# Patient Record
Sex: FEMALE | Race: BLACK OR AFRICAN AMERICAN | NOT HISPANIC OR LATINO
[De-identification: names, ages, dates, MRNs, and addresses within clinical notes are randomized per-mention and may not be internally consistent; named-entity substitution may affect disease eponyms.]

---

## 2017-01-05 ENCOUNTER — MEDICATION RENEWAL (OUTPATIENT)
Age: 52
End: 2017-01-05

## 2017-01-06 ENCOUNTER — RX RENEWAL (OUTPATIENT)
Age: 52
End: 2017-01-06

## 2017-02-13 ENCOUNTER — LABORATORY RESULT (OUTPATIENT)
Age: 52
End: 2017-02-13

## 2017-02-13 ENCOUNTER — OUTPATIENT (OUTPATIENT)
Dept: OUTPATIENT SERVICES | Facility: HOSPITAL | Age: 52
LOS: 1 days | End: 2017-02-13

## 2017-02-13 ENCOUNTER — APPOINTMENT (OUTPATIENT)
Dept: INTERNAL MEDICINE | Facility: HOSPITAL | Age: 52
End: 2017-02-13

## 2017-02-13 VITALS — DIASTOLIC BLOOD PRESSURE: 86 MMHG | HEART RATE: 76 BPM | SYSTOLIC BLOOD PRESSURE: 140 MMHG

## 2017-02-13 VITALS — BODY MASS INDEX: 32.43 KG/M2 | WEIGHT: 183 LBS | HEIGHT: 63 IN

## 2017-02-13 DIAGNOSIS — G47.33 OBSTRUCTIVE SLEEP APNEA (ADULT) (PEDIATRIC): ICD-10-CM

## 2017-02-13 DIAGNOSIS — Z98.89 OTHER SPECIFIED POSTPROCEDURAL STATES: Chronic | ICD-10-CM

## 2017-02-13 LAB
ALBUMIN SERPL ELPH-MCNC: 4.2 G/DL — SIGNIFICANT CHANGE UP (ref 3.3–5)
ALP SERPL-CCNC: 89 U/L — SIGNIFICANT CHANGE UP (ref 40–120)
ALT FLD-CCNC: 15 U/L — SIGNIFICANT CHANGE UP (ref 4–33)
AST SERPL-CCNC: 18 U/L — SIGNIFICANT CHANGE UP (ref 4–32)
BASOPHILS # BLD AUTO: 0.01 K/UL — SIGNIFICANT CHANGE UP (ref 0–0.2)
BASOPHILS NFR BLD AUTO: 0.1 % — SIGNIFICANT CHANGE UP (ref 0–2)
BILIRUB SERPL-MCNC: 0.5 MG/DL — SIGNIFICANT CHANGE UP (ref 0.2–1.2)
BUN SERPL-MCNC: 10 MG/DL — SIGNIFICANT CHANGE UP (ref 7–23)
CALCIUM SERPL-MCNC: 9.7 MG/DL — SIGNIFICANT CHANGE UP (ref 8.4–10.5)
CHLORIDE SERPL-SCNC: 104 MMOL/L — SIGNIFICANT CHANGE UP (ref 98–107)
CO2 SERPL-SCNC: 26 MMOL/L — SIGNIFICANT CHANGE UP (ref 22–31)
CREAT SERPL-MCNC: 0.73 MG/DL — SIGNIFICANT CHANGE UP (ref 0.5–1.3)
EOSINOPHIL # BLD AUTO: 0.12 K/UL — SIGNIFICANT CHANGE UP (ref 0–0.5)
EOSINOPHIL NFR BLD AUTO: 1.3 % — SIGNIFICANT CHANGE UP (ref 0–6)
ERYTHROCYTE [SEDIMENTATION RATE] IN BLOOD: 53 MM/HR — HIGH (ref 4–25)
GLUCOSE SERPL-MCNC: 107 MG/DL — HIGH (ref 70–99)
HBA1C BLD-MCNC: 6.4 % — HIGH (ref 4–5.6)
HCT VFR BLD CALC: 38 % — SIGNIFICANT CHANGE UP (ref 34.5–45)
HGB BLD-MCNC: 12.7 G/DL — SIGNIFICANT CHANGE UP (ref 11.5–15.5)
IMM GRANULOCYTES NFR BLD AUTO: 0.2 % — SIGNIFICANT CHANGE UP (ref 0–1.5)
LYMPHOCYTES # BLD AUTO: 3.29 K/UL — SIGNIFICANT CHANGE UP (ref 1–3.3)
LYMPHOCYTES # BLD AUTO: 36.3 % — SIGNIFICANT CHANGE UP (ref 13–44)
MCHC RBC-ENTMCNC: 28.9 PG — SIGNIFICANT CHANGE UP (ref 27–34)
MCHC RBC-ENTMCNC: 33.4 % — SIGNIFICANT CHANGE UP (ref 32–36)
MCV RBC AUTO: 86.6 FL — SIGNIFICANT CHANGE UP (ref 80–100)
MONOCYTES # BLD AUTO: 0.3 K/UL — SIGNIFICANT CHANGE UP (ref 0–0.9)
MONOCYTES NFR BLD AUTO: 3.3 % — SIGNIFICANT CHANGE UP (ref 2–14)
NEUTROPHILS # BLD AUTO: 5.32 K/UL — SIGNIFICANT CHANGE UP (ref 1.8–7.4)
NEUTROPHILS NFR BLD AUTO: 58.8 % — SIGNIFICANT CHANGE UP (ref 43–77)
PLATELET # BLD AUTO: 319 K/UL — SIGNIFICANT CHANGE UP (ref 150–400)
PMV BLD: 10 FL — SIGNIFICANT CHANGE UP (ref 7–13)
POTASSIUM SERPL-MCNC: 3.3 MMOL/L — LOW (ref 3.5–5.3)
POTASSIUM SERPL-SCNC: 3.3 MMOL/L — LOW (ref 3.5–5.3)
PROT SERPL-MCNC: 8 G/DL — SIGNIFICANT CHANGE UP (ref 6–8.3)
RBC # BLD: 4.39 M/UL — SIGNIFICANT CHANGE UP (ref 3.8–5.2)
RBC # FLD: 12.9 % — SIGNIFICANT CHANGE UP (ref 10.3–14.5)
SODIUM SERPL-SCNC: 144 MMOL/L — SIGNIFICANT CHANGE UP (ref 135–145)
TSH SERPL-MCNC: 0.57 UIU/ML — SIGNIFICANT CHANGE UP (ref 0.27–4.2)
WBC # BLD: 9.06 K/UL — SIGNIFICANT CHANGE UP (ref 3.8–10.5)
WBC # FLD AUTO: 9.06 K/UL — SIGNIFICANT CHANGE UP (ref 3.8–10.5)

## 2017-02-14 DIAGNOSIS — I50.30 UNSPECIFIED DIASTOLIC (CONGESTIVE) HEART FAILURE: ICD-10-CM

## 2017-02-14 DIAGNOSIS — I10 ESSENTIAL (PRIMARY) HYPERTENSION: ICD-10-CM

## 2017-02-14 DIAGNOSIS — R40.0 SOMNOLENCE: ICD-10-CM

## 2017-02-14 DIAGNOSIS — E87.6 HYPOKALEMIA: ICD-10-CM

## 2017-02-14 LAB
ALDOST SERPL-MCNC: 12.9 NG/DL — SIGNIFICANT CHANGE UP
RENIN DIRECT, PLASMA: < 2.1 PG/ML — SIGNIFICANT CHANGE UP

## 2017-03-01 ENCOUNTER — APPOINTMENT (OUTPATIENT)
Dept: PULMONOLOGY | Facility: CLINIC | Age: 52
End: 2017-03-01

## 2017-03-13 ENCOUNTER — APPOINTMENT (OUTPATIENT)
Dept: OBGYN | Facility: HOSPITAL | Age: 52
End: 2017-03-13

## 2017-04-05 ENCOUNTER — RX RENEWAL (OUTPATIENT)
Age: 52
End: 2017-04-05

## 2017-07-13 ENCOUNTER — EMERGENCY (EMERGENCY)
Facility: HOSPITAL | Age: 52
LOS: 1 days | Discharge: HOME | End: 2017-07-13
Attending: EMERGENCY MEDICINE
Payer: COMMERCIAL

## 2017-07-13 VITALS — DIASTOLIC BLOOD PRESSURE: 76 MMHG | RESPIRATION RATE: 19 BRPM | HEART RATE: 89 BPM | SYSTOLIC BLOOD PRESSURE: 132 MMHG

## 2017-07-13 VITALS
TEMPERATURE: 98 F | SYSTOLIC BLOOD PRESSURE: 124 MMHG | OXYGEN SATURATION: 99 % | HEART RATE: 88 BPM | DIASTOLIC BLOOD PRESSURE: 77 MMHG

## 2017-07-13 DIAGNOSIS — Z98.89 OTHER SPECIFIED POSTPROCEDURAL STATES: Chronic | ICD-10-CM

## 2017-07-13 PROCEDURE — 99284 EMERGENCY DEPT VISIT MOD MDM: CPT

## 2017-07-13 PROCEDURE — 73502 X-RAY EXAM HIP UNI 2-3 VIEWS: CPT

## 2017-07-13 PROCEDURE — 71120 X-RAY EXAM BREASTBONE 2/>VWS: CPT | Mod: 26

## 2017-07-13 PROCEDURE — 73120 X-RAY EXAM OF HAND: CPT

## 2017-07-13 PROCEDURE — 70450 CT HEAD/BRAIN W/O DYE: CPT

## 2017-07-13 PROCEDURE — 73562 X-RAY EXAM OF KNEE 3: CPT | Mod: 26,LT

## 2017-07-13 PROCEDURE — 73502 X-RAY EXAM HIP UNI 2-3 VIEWS: CPT | Mod: 26,LT

## 2017-07-13 PROCEDURE — 71120 X-RAY EXAM BREASTBONE 2/>VWS: CPT

## 2017-07-13 PROCEDURE — 99284 EMERGENCY DEPT VISIT MOD MDM: CPT | Mod: 25

## 2017-07-13 PROCEDURE — 73120 X-RAY EXAM OF HAND: CPT | Mod: 26,LT

## 2017-07-13 PROCEDURE — 71010: CPT | Mod: 26

## 2017-07-13 PROCEDURE — 70450 CT HEAD/BRAIN W/O DYE: CPT | Mod: 26

## 2017-07-13 PROCEDURE — 71045 X-RAY EXAM CHEST 1 VIEW: CPT

## 2017-07-13 PROCEDURE — 73562 X-RAY EXAM OF KNEE 3: CPT

## 2017-07-13 RX ORDER — ACETAMINOPHEN 500 MG
975 TABLET ORAL ONCE
Qty: 0 | Refills: 0 | Status: COMPLETED | OUTPATIENT
Start: 2017-07-13 | End: 2017-07-13

## 2017-07-13 RX ORDER — IBUPROFEN 200 MG
600 TABLET ORAL ONCE
Qty: 0 | Refills: 0 | Status: COMPLETED | OUTPATIENT
Start: 2017-07-13 | End: 2017-07-13

## 2017-07-13 RX ADMIN — Medication 600 MILLIGRAM(S): at 17:19

## 2017-07-13 RX ADMIN — Medication 975 MILLIGRAM(S): at 17:19
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
## 2017-07-13 NOTE — ED PROVIDER NOTE - PLAN OF CARE
You may take 1000mg of tylenol every 6 hours for baseline pain control with respect to the warnings on the label  You may take 600mg of ibuprofen (example: motrin or advil) every 4-6 hours for breakthrough pain control as indicated with respect to the warnings on the label. This is an over the counter medication.   Follow up with the [facility] [phone] within 48 hours.  Refrain from physical activity and activities that involve extreme focus and concentration including reading, texting, studying, working for 2-3 days.

## 2017-07-13 NOTE — ED PROVIDER NOTE - NS ED ROS FT
CONST: no fevers, no chills  EYES: no pain  ENT: no sore throat   CV: right chest pain  RESP: no shortness of breath  ABD: no abdominal pain   : no dysuria  MSK: msk pain as described in hpi  NEURO: frontal headache   HEME: no easy bleeding  SKIN:  no rash
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
## 2017-07-13 NOTE — ED PROVIDER NOTE - PROGRESS NOTE DETAILS
Digna PGY3: feels better, no obvious fractures on exam. no midline tenderness (collar cleared). will be discharged with follow up and concussion precautions
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
## 2017-07-25 ENCOUNTER — RX RENEWAL (OUTPATIENT)
Age: 52
End: 2017-07-25

## 2017-08-22 ENCOUNTER — LABORATORY RESULT (OUTPATIENT)
Age: 52
End: 2017-08-22

## 2017-08-22 ENCOUNTER — RESULT REVIEW (OUTPATIENT)
Age: 52
End: 2017-08-22

## 2017-08-22 ENCOUNTER — OUTPATIENT (OUTPATIENT)
Dept: OUTPATIENT SERVICES | Facility: HOSPITAL | Age: 52
LOS: 1 days | End: 2017-08-22

## 2017-08-22 ENCOUNTER — APPOINTMENT (OUTPATIENT)
Dept: OBGYN | Facility: HOSPITAL | Age: 52
End: 2017-08-22
Payer: COMMERCIAL

## 2017-08-22 VITALS
BODY MASS INDEX: 34.2 KG/M2 | HEIGHT: 63 IN | SYSTOLIC BLOOD PRESSURE: 136 MMHG | DIASTOLIC BLOOD PRESSURE: 74 MMHG | HEART RATE: 79 BPM | WEIGHT: 193 LBS

## 2017-08-22 DIAGNOSIS — Z01.419 ENCOUNTER FOR GYNECOLOGICAL EXAMINATION (GENERAL) (ROUTINE) W/OUT ABNORMAL FINDINGS: ICD-10-CM

## 2017-08-22 DIAGNOSIS — Z98.89 OTHER SPECIFIED POSTPROCEDURAL STATES: Chronic | ICD-10-CM

## 2017-08-22 PROCEDURE — 57500 BIOPSY OF CERVIX: CPT | Mod: GC

## 2017-08-22 PROCEDURE — 99203 OFFICE O/P NEW LOW 30 MIN: CPT | Mod: 25,GC

## 2017-08-23 ENCOUNTER — APPOINTMENT (OUTPATIENT)
Dept: INTERNAL MEDICINE | Facility: HOSPITAL | Age: 52
End: 2017-08-23
Payer: MEDICAID

## 2017-08-23 ENCOUNTER — LABORATORY RESULT (OUTPATIENT)
Age: 52
End: 2017-08-23

## 2017-08-23 ENCOUNTER — OUTPATIENT (OUTPATIENT)
Dept: OUTPATIENT SERVICES | Facility: HOSPITAL | Age: 52
LOS: 1 days | End: 2017-08-23

## 2017-08-23 VITALS — WEIGHT: 189 LBS | HEIGHT: 63 IN | BODY MASS INDEX: 33.49 KG/M2

## 2017-08-23 DIAGNOSIS — Z01.419 ENCOUNTER FOR GYNECOLOGICAL EXAMINATION (GENERAL) (ROUTINE) WITHOUT ABNORMAL FINDINGS: ICD-10-CM

## 2017-08-23 DIAGNOSIS — Z98.89 OTHER SPECIFIED POSTPROCEDURAL STATES: Chronic | ICD-10-CM

## 2017-08-23 DIAGNOSIS — H91.92 UNSPECIFIED HEARING LOSS, LEFT EAR: ICD-10-CM

## 2017-08-23 DIAGNOSIS — N84.1 POLYP OF CERVIX UTERI: ICD-10-CM

## 2017-08-23 DIAGNOSIS — R06.02 SHORTNESS OF BREATH: ICD-10-CM

## 2017-08-23 LAB
BUN SERPL-MCNC: 11 MG/DL — SIGNIFICANT CHANGE UP (ref 7–23)
C TRACH RRNA SPEC QL NAA+PROBE: SIGNIFICANT CHANGE UP
CALCIUM SERPL-MCNC: 9.9 MG/DL — SIGNIFICANT CHANGE UP (ref 8.4–10.5)
CHLORIDE SERPL-SCNC: 102 MMOL/L — SIGNIFICANT CHANGE UP (ref 98–107)
CHOLEST SERPL-MCNC: 179 MG/DL — SIGNIFICANT CHANGE UP (ref 120–199)
CO2 SERPL-SCNC: 28 MMOL/L — SIGNIFICANT CHANGE UP (ref 22–31)
CREAT SERPL-MCNC: 0.78 MG/DL — SIGNIFICANT CHANGE UP (ref 0.5–1.3)
GLUCOSE SERPL-MCNC: 106 MG/DL — HIGH (ref 70–99)
HBA1C BLD-MCNC: 6.7 % — HIGH (ref 4–5.6)
HDLC SERPL-MCNC: 52 MG/DL — SIGNIFICANT CHANGE UP (ref 45–65)
HIV1 AG SER QL: SIGNIFICANT CHANGE UP
HIV1+2 AB SPEC QL: SIGNIFICANT CHANGE UP
HPV HIGH+LOW RISK DNA PNL CVX: SIGNIFICANT CHANGE UP
LIPID PNL WITH DIRECT LDL SERPL: 121 MG/DL — SIGNIFICANT CHANGE UP
N GONORRHOEA RRNA SPEC QL NAA+PROBE: SIGNIFICANT CHANGE UP
POTASSIUM SERPL-MCNC: 3.9 MMOL/L — SIGNIFICANT CHANGE UP (ref 3.5–5.3)
POTASSIUM SERPL-SCNC: 3.9 MMOL/L — SIGNIFICANT CHANGE UP (ref 3.5–5.3)
SODIUM SERPL-SCNC: 143 MMOL/L — SIGNIFICANT CHANGE UP (ref 135–145)
SPECIMEN SOURCE: SIGNIFICANT CHANGE UP
TRIGL SERPL-MCNC: 106 MG/DL — SIGNIFICANT CHANGE UP (ref 10–149)

## 2017-08-23 PROCEDURE — 99213 OFFICE O/P EST LOW 20 MIN: CPT | Mod: GE

## 2017-08-24 VITALS — DIASTOLIC BLOOD PRESSURE: 88 MMHG | SYSTOLIC BLOOD PRESSURE: 140 MMHG

## 2017-08-25 LAB — CYTOLOGY SPEC DOC CYTO: SIGNIFICANT CHANGE UP

## 2017-08-28 DIAGNOSIS — R09.82 POSTNASAL DRIP: ICD-10-CM

## 2017-08-28 DIAGNOSIS — R73.03 PREDIABETES: ICD-10-CM

## 2017-08-28 DIAGNOSIS — I10 ESSENTIAL (PRIMARY) HYPERTENSION: ICD-10-CM

## 2017-08-28 DIAGNOSIS — I50.30 UNSPECIFIED DIASTOLIC (CONGESTIVE) HEART FAILURE: ICD-10-CM

## 2017-12-20 ENCOUNTER — RX RENEWAL (OUTPATIENT)
Age: 52
End: 2017-12-20

## 2017-12-21 ENCOUNTER — TRANSCRIPTION ENCOUNTER (OUTPATIENT)
Age: 52
End: 2017-12-21

## 2018-02-15 ENCOUNTER — LABORATORY RESULT (OUTPATIENT)
Age: 53
End: 2018-02-15

## 2018-02-15 ENCOUNTER — OUTPATIENT (OUTPATIENT)
Dept: OUTPATIENT SERVICES | Facility: HOSPITAL | Age: 53
LOS: 1 days | End: 2018-02-15

## 2018-02-15 ENCOUNTER — MED ADMIN CHARGE (OUTPATIENT)
Age: 53
End: 2018-02-15

## 2018-02-15 ENCOUNTER — APPOINTMENT (OUTPATIENT)
Dept: INTERNAL MEDICINE | Facility: HOSPITAL | Age: 53
End: 2018-02-15
Payer: MEDICAID

## 2018-02-15 VITALS — HEART RATE: 74 BPM | SYSTOLIC BLOOD PRESSURE: 130 MMHG | DIASTOLIC BLOOD PRESSURE: 90 MMHG | RESPIRATION RATE: 12 BRPM

## 2018-02-15 VITALS — HEIGHT: 64 IN | BODY MASS INDEX: 32.66 KG/M2 | WEIGHT: 191.3 LBS

## 2018-02-15 DIAGNOSIS — Z98.89 OTHER SPECIFIED POSTPROCEDURAL STATES: Chronic | ICD-10-CM

## 2018-02-15 LAB
ALBUMIN SERPL ELPH-MCNC: 4.3 G/DL — SIGNIFICANT CHANGE UP (ref 3.3–5)
ALP SERPL-CCNC: 95 U/L — SIGNIFICANT CHANGE UP (ref 40–120)
ALT FLD-CCNC: 20 U/L — SIGNIFICANT CHANGE UP (ref 4–33)
AST SERPL-CCNC: 23 U/L — SIGNIFICANT CHANGE UP (ref 4–32)
BASOPHILS # BLD AUTO: 0.03 K/UL — SIGNIFICANT CHANGE UP (ref 0–0.2)
BASOPHILS NFR BLD AUTO: 0.3 % — SIGNIFICANT CHANGE UP (ref 0–2)
BILIRUB SERPL-MCNC: 0.8 MG/DL — SIGNIFICANT CHANGE UP (ref 0.2–1.2)
BUN SERPL-MCNC: 10 MG/DL — SIGNIFICANT CHANGE UP (ref 7–23)
CALCIUM SERPL-MCNC: 9.4 MG/DL — SIGNIFICANT CHANGE UP (ref 8.4–10.5)
CHLORIDE SERPL-SCNC: 101 MMOL/L — SIGNIFICANT CHANGE UP (ref 98–107)
CO2 SERPL-SCNC: 29 MMOL/L — SIGNIFICANT CHANGE UP (ref 22–31)
CREAT SERPL-MCNC: 0.69 MG/DL — SIGNIFICANT CHANGE UP (ref 0.5–1.3)
EOSINOPHIL # BLD AUTO: 0.13 K/UL — SIGNIFICANT CHANGE UP (ref 0–0.5)
EOSINOPHIL NFR BLD AUTO: 1.3 % — SIGNIFICANT CHANGE UP (ref 0–6)
GLUCOSE SERPL-MCNC: 113 MG/DL — HIGH (ref 70–99)
HBA1C BLD-MCNC: 7.1 % — HIGH (ref 4–5.6)
HCT VFR BLD CALC: 39.7 % — SIGNIFICANT CHANGE UP (ref 34.5–45)
HGB BLD-MCNC: 13.4 G/DL — SIGNIFICANT CHANGE UP (ref 11.5–15.5)
IMM GRANULOCYTES # BLD AUTO: 0.03 # — SIGNIFICANT CHANGE UP
IMM GRANULOCYTES NFR BLD AUTO: 0.3 % — SIGNIFICANT CHANGE UP (ref 0–1.5)
LYMPHOCYTES # BLD AUTO: 3.66 K/UL — HIGH (ref 1–3.3)
LYMPHOCYTES # BLD AUTO: 36.2 % — SIGNIFICANT CHANGE UP (ref 13–44)
MCHC RBC-ENTMCNC: 29.6 PG — SIGNIFICANT CHANGE UP (ref 27–34)
MCHC RBC-ENTMCNC: 33.8 % — SIGNIFICANT CHANGE UP (ref 32–36)
MCV RBC AUTO: 87.8 FL — SIGNIFICANT CHANGE UP (ref 80–100)
MONOCYTES # BLD AUTO: 0.49 K/UL — SIGNIFICANT CHANGE UP (ref 0–0.9)
MONOCYTES NFR BLD AUTO: 4.9 % — SIGNIFICANT CHANGE UP (ref 2–14)
NEUTROPHILS # BLD AUTO: 5.76 K/UL — SIGNIFICANT CHANGE UP (ref 1.8–7.4)
NEUTROPHILS NFR BLD AUTO: 57 % — SIGNIFICANT CHANGE UP (ref 43–77)
NRBC # FLD: 0 — SIGNIFICANT CHANGE UP
PLATELET # BLD AUTO: 333 K/UL — SIGNIFICANT CHANGE UP (ref 150–400)
PMV BLD: 9.7 FL — SIGNIFICANT CHANGE UP (ref 7–13)
POTASSIUM SERPL-MCNC: 3.1 MMOL/L — LOW (ref 3.5–5.3)
POTASSIUM SERPL-SCNC: 3.1 MMOL/L — LOW (ref 3.5–5.3)
PROT SERPL-MCNC: 8.2 G/DL — SIGNIFICANT CHANGE UP (ref 6–8.3)
RBC # BLD: 4.52 M/UL — SIGNIFICANT CHANGE UP (ref 3.8–5.2)
RBC # FLD: 12.8 % — SIGNIFICANT CHANGE UP (ref 10.3–14.5)
SODIUM SERPL-SCNC: 142 MMOL/L — SIGNIFICANT CHANGE UP (ref 135–145)
T4 FREE SERPL-MCNC: 1.34 NG/DL — SIGNIFICANT CHANGE UP (ref 0.9–1.8)
TSH SERPL-MCNC: 0.79 UIU/ML — SIGNIFICANT CHANGE UP (ref 0.27–4.2)
WBC # BLD: 10.1 K/UL — SIGNIFICANT CHANGE UP (ref 3.8–10.5)
WBC # FLD AUTO: 10.1 K/UL — SIGNIFICANT CHANGE UP (ref 3.8–10.5)

## 2018-02-15 PROCEDURE — 99214 OFFICE O/P EST MOD 30 MIN: CPT | Mod: GC

## 2018-02-16 DIAGNOSIS — F32.9 MAJOR DEPRESSIVE DISORDER, SINGLE EPISODE, UNSPECIFIED: ICD-10-CM

## 2018-02-16 DIAGNOSIS — Z00.00 ENCOUNTER FOR GENERAL ADULT MEDICAL EXAMINATION WITHOUT ABNORMAL FINDINGS: ICD-10-CM

## 2018-02-16 DIAGNOSIS — Z23 ENCOUNTER FOR IMMUNIZATION: ICD-10-CM

## 2018-02-16 DIAGNOSIS — E66.9 OBESITY, UNSPECIFIED: ICD-10-CM

## 2018-02-16 DIAGNOSIS — R22.1 LOCALIZED SWELLING, MASS AND LUMP, NECK: ICD-10-CM

## 2018-02-16 DIAGNOSIS — I50.30 UNSPECIFIED DIASTOLIC (CONGESTIVE) HEART FAILURE: ICD-10-CM

## 2018-02-16 DIAGNOSIS — R40.0 SOMNOLENCE: ICD-10-CM

## 2018-02-16 DIAGNOSIS — R73.03 PREDIABETES: ICD-10-CM

## 2018-02-16 DIAGNOSIS — I10 ESSENTIAL (PRIMARY) HYPERTENSION: ICD-10-CM

## 2018-02-16 LAB
CREAT UR-MCNC: 294 MG/DL — SIGNIFICANT CHANGE UP
MICROALBUMIN UR-MCNC: 16.5 MG/DL — SIGNIFICANT CHANGE UP
MICROALBUMIN/CREAT UR-RTO: 56 MG/G — HIGH (ref 0–30)

## 2018-02-20 ENCOUNTER — RX RENEWAL (OUTPATIENT)
Age: 53
End: 2018-02-20

## 2018-02-26 ENCOUNTER — FORM ENCOUNTER (OUTPATIENT)
Age: 53
End: 2018-02-26

## 2018-02-27 ENCOUNTER — APPOINTMENT (OUTPATIENT)
Dept: MAMMOGRAPHY | Facility: IMAGING CENTER | Age: 53
End: 2018-02-27
Payer: MEDICAID

## 2018-02-27 ENCOUNTER — OUTPATIENT (OUTPATIENT)
Dept: OUTPATIENT SERVICES | Facility: HOSPITAL | Age: 53
LOS: 1 days | End: 2018-02-27
Payer: MEDICAID

## 2018-02-27 DIAGNOSIS — Z00.00 ENCOUNTER FOR GENERAL ADULT MEDICAL EXAMINATION WITHOUT ABNORMAL FINDINGS: ICD-10-CM

## 2018-02-27 DIAGNOSIS — Z98.89 OTHER SPECIFIED POSTPROCEDURAL STATES: Chronic | ICD-10-CM

## 2018-02-27 PROCEDURE — 77063 BREAST TOMOSYNTHESIS BI: CPT

## 2018-02-27 PROCEDURE — 77063 BREAST TOMOSYNTHESIS BI: CPT | Mod: 26

## 2018-02-27 PROCEDURE — 77067 SCR MAMMO BI INCL CAD: CPT | Mod: 26

## 2018-02-27 PROCEDURE — 77067 SCR MAMMO BI INCL CAD: CPT

## 2018-03-19 ENCOUNTER — FORM ENCOUNTER (OUTPATIENT)
Age: 53
End: 2018-03-19

## 2018-03-20 ENCOUNTER — APPOINTMENT (OUTPATIENT)
Dept: ULTRASOUND IMAGING | Facility: IMAGING CENTER | Age: 53
End: 2018-03-20
Payer: MEDICAID

## 2018-03-20 ENCOUNTER — OUTPATIENT (OUTPATIENT)
Dept: OUTPATIENT SERVICES | Facility: HOSPITAL | Age: 53
LOS: 1 days | End: 2018-03-20
Payer: MEDICAID

## 2018-03-20 ENCOUNTER — APPOINTMENT (OUTPATIENT)
Dept: MAMMOGRAPHY | Facility: IMAGING CENTER | Age: 53
End: 2018-03-20
Payer: MEDICAID

## 2018-03-20 DIAGNOSIS — Z98.89 OTHER SPECIFIED POSTPROCEDURAL STATES: Chronic | ICD-10-CM

## 2018-03-20 DIAGNOSIS — Z00.8 ENCOUNTER FOR OTHER GENERAL EXAMINATION: ICD-10-CM

## 2018-03-20 PROCEDURE — 77065 DX MAMMO INCL CAD UNI: CPT | Mod: 26,LT

## 2018-03-20 PROCEDURE — G0279: CPT | Mod: 26

## 2018-03-20 PROCEDURE — 76642 ULTRASOUND BREAST LIMITED: CPT | Mod: 26,LT

## 2018-03-20 PROCEDURE — G0279: CPT

## 2018-03-20 PROCEDURE — 76642 ULTRASOUND BREAST LIMITED: CPT

## 2018-03-20 PROCEDURE — 77065 DX MAMMO INCL CAD UNI: CPT

## 2018-03-21 ENCOUNTER — APPOINTMENT (OUTPATIENT)
Dept: CV DIAGNOSITCS | Facility: HOSPITAL | Age: 53
End: 2018-03-21

## 2018-03-26 ENCOUNTER — FORM ENCOUNTER (OUTPATIENT)
Age: 53
End: 2018-03-26

## 2018-03-27 ENCOUNTER — APPOINTMENT (OUTPATIENT)
Dept: ULTRASOUND IMAGING | Facility: IMAGING CENTER | Age: 53
End: 2018-03-27
Payer: MEDICAID

## 2018-03-27 ENCOUNTER — OUTPATIENT (OUTPATIENT)
Dept: OUTPATIENT SERVICES | Facility: HOSPITAL | Age: 53
LOS: 1 days | End: 2018-03-27
Payer: MEDICAID

## 2018-03-27 DIAGNOSIS — Z98.89 OTHER SPECIFIED POSTPROCEDURAL STATES: Chronic | ICD-10-CM

## 2018-03-27 DIAGNOSIS — Z00.8 ENCOUNTER FOR OTHER GENERAL EXAMINATION: ICD-10-CM

## 2018-03-27 PROCEDURE — 76536 US EXAM OF HEAD AND NECK: CPT | Mod: 26

## 2018-03-27 PROCEDURE — 76536 US EXAM OF HEAD AND NECK: CPT

## 2018-05-29 ENCOUNTER — APPOINTMENT (OUTPATIENT)
Dept: INTERNAL MEDICINE | Facility: HOSPITAL | Age: 53
End: 2018-05-29

## 2018-08-30 ENCOUNTER — RX RENEWAL (OUTPATIENT)
Age: 53
End: 2018-08-30

## 2018-09-24 ENCOUNTER — RX RENEWAL (OUTPATIENT)
Age: 53
End: 2018-09-24

## 2018-10-12 ENCOUNTER — OTHER (OUTPATIENT)
Age: 53
End: 2018-10-12

## 2018-10-15 ENCOUNTER — OUTPATIENT (OUTPATIENT)
Dept: OUTPATIENT SERVICES | Facility: HOSPITAL | Age: 53
LOS: 1 days | End: 2018-10-15

## 2018-10-15 ENCOUNTER — APPOINTMENT (OUTPATIENT)
Dept: INTERNAL MEDICINE | Facility: HOSPITAL | Age: 53
End: 2018-10-15
Payer: MEDICAID

## 2018-10-15 ENCOUNTER — LABORATORY RESULT (OUTPATIENT)
Age: 53
End: 2018-10-15

## 2018-10-15 ENCOUNTER — OTHER (OUTPATIENT)
Age: 53
End: 2018-10-15

## 2018-10-15 ENCOUNTER — MED ADMIN CHARGE (OUTPATIENT)
Age: 53
End: 2018-10-15

## 2018-10-15 VITALS — HEART RATE: 72 BPM | DIASTOLIC BLOOD PRESSURE: 90 MMHG | SYSTOLIC BLOOD PRESSURE: 140 MMHG

## 2018-10-15 VITALS — WEIGHT: 188 LBS | BODY MASS INDEX: 33.31 KG/M2 | HEIGHT: 63 IN

## 2018-10-15 DIAGNOSIS — Z98.89 OTHER SPECIFIED POSTPROCEDURAL STATES: Chronic | ICD-10-CM

## 2018-10-15 LAB
ALBUMIN SERPL ELPH-MCNC: 4.4 G/DL — SIGNIFICANT CHANGE UP (ref 3.3–5)
ALP SERPL-CCNC: 88 U/L — SIGNIFICANT CHANGE UP (ref 40–120)
ALT FLD-CCNC: 27 U/L — SIGNIFICANT CHANGE UP (ref 4–33)
AST SERPL-CCNC: 27 U/L — SIGNIFICANT CHANGE UP (ref 4–32)
BASOPHILS # BLD AUTO: 0.05 K/UL — SIGNIFICANT CHANGE UP (ref 0–0.2)
BASOPHILS NFR BLD AUTO: 0.5 % — SIGNIFICANT CHANGE UP (ref 0–2)
BILIRUB SERPL-MCNC: 0.6 MG/DL — SIGNIFICANT CHANGE UP (ref 0.2–1.2)
BUN SERPL-MCNC: 9 MG/DL — SIGNIFICANT CHANGE UP (ref 7–23)
CALCIUM SERPL-MCNC: 9.6 MG/DL — SIGNIFICANT CHANGE UP (ref 8.4–10.5)
CHLORIDE SERPL-SCNC: 101 MMOL/L — SIGNIFICANT CHANGE UP (ref 98–107)
CHOLEST SERPL-MCNC: 174 MG/DL — SIGNIFICANT CHANGE UP (ref 120–199)
CO2 SERPL-SCNC: 27 MMOL/L — SIGNIFICANT CHANGE UP (ref 22–31)
CREAT SERPL-MCNC: 0.72 MG/DL — SIGNIFICANT CHANGE UP (ref 0.5–1.3)
EOSINOPHIL # BLD AUTO: 0.18 K/UL — SIGNIFICANT CHANGE UP (ref 0–0.5)
EOSINOPHIL NFR BLD AUTO: 1.9 % — SIGNIFICANT CHANGE UP (ref 0–6)
GLUCOSE SERPL-MCNC: 114 MG/DL — HIGH (ref 70–99)
HBA1C BLD-MCNC: 6.9 % — HIGH (ref 4–5.6)
HCT VFR BLD CALC: 39.8 % — SIGNIFICANT CHANGE UP (ref 34.5–45)
HDLC SERPL-MCNC: 50 MG/DL — SIGNIFICANT CHANGE UP (ref 45–65)
HGB BLD-MCNC: 13.3 G/DL — SIGNIFICANT CHANGE UP (ref 11.5–15.5)
IMM GRANULOCYTES # BLD AUTO: 0.03 # — SIGNIFICANT CHANGE UP
IMM GRANULOCYTES NFR BLD AUTO: 0.3 % — SIGNIFICANT CHANGE UP (ref 0–1.5)
LIPID PNL WITH DIRECT LDL SERPL: 116 MG/DL — SIGNIFICANT CHANGE UP
LYMPHOCYTES # BLD AUTO: 3.75 K/UL — HIGH (ref 1–3.3)
LYMPHOCYTES # BLD AUTO: 40.3 % — SIGNIFICANT CHANGE UP (ref 13–44)
MCHC RBC-ENTMCNC: 29.4 PG — SIGNIFICANT CHANGE UP (ref 27–34)
MCHC RBC-ENTMCNC: 33.4 % — SIGNIFICANT CHANGE UP (ref 32–36)
MCV RBC AUTO: 87.9 FL — SIGNIFICANT CHANGE UP (ref 80–100)
MONOCYTES # BLD AUTO: 0.45 K/UL — SIGNIFICANT CHANGE UP (ref 0–0.9)
MONOCYTES NFR BLD AUTO: 4.8 % — SIGNIFICANT CHANGE UP (ref 2–14)
NEUTROPHILS # BLD AUTO: 4.85 K/UL — SIGNIFICANT CHANGE UP (ref 1.8–7.4)
NEUTROPHILS NFR BLD AUTO: 52.2 % — SIGNIFICANT CHANGE UP (ref 43–77)
NRBC # FLD: 0 — SIGNIFICANT CHANGE UP
PLATELET # BLD AUTO: 359 K/UL — SIGNIFICANT CHANGE UP (ref 150–400)
PMV BLD: 9.8 FL — SIGNIFICANT CHANGE UP (ref 7–13)
POTASSIUM SERPL-MCNC: 3.5 MMOL/L — SIGNIFICANT CHANGE UP (ref 3.5–5.3)
POTASSIUM SERPL-SCNC: 3.5 MMOL/L — SIGNIFICANT CHANGE UP (ref 3.5–5.3)
PROT SERPL-MCNC: 8.4 G/DL — HIGH (ref 6–8.3)
RBC # BLD: 4.53 M/UL — SIGNIFICANT CHANGE UP (ref 3.8–5.2)
RBC # FLD: 12.7 % — SIGNIFICANT CHANGE UP (ref 10.3–14.5)
SODIUM SERPL-SCNC: 141 MMOL/L — SIGNIFICANT CHANGE UP (ref 135–145)
TRIGL SERPL-MCNC: 81 MG/DL — SIGNIFICANT CHANGE UP (ref 10–149)
WBC # BLD: 9.31 K/UL — SIGNIFICANT CHANGE UP (ref 3.8–10.5)
WBC # FLD AUTO: 9.31 K/UL — SIGNIFICANT CHANGE UP (ref 3.8–10.5)

## 2018-10-15 PROCEDURE — 99214 OFFICE O/P EST MOD 30 MIN: CPT | Mod: GC

## 2018-10-15 NOTE — PHYSICAL EXAM
[No Acute Distress] : no acute distress [Well Nourished] : well nourished [Well Developed] : well developed [Well-Appearing] : well-appearing [Normal Sclera/Conjunctiva] : normal sclera/conjunctiva [PERRL] : pupils equal round and reactive to light [EOMI] : extraocular movements intact [Normal Outer Ear/Nose] : the outer ears and nose were normal in appearance [Normal Oropharynx] : the oropharynx was normal [No JVD] : no jugular venous distention [Supple] : supple [No Lymphadenopathy] : no lymphadenopathy [Thyroid Normal, No Nodules] : the thyroid was normal and there were no nodules present [No Respiratory Distress] : no respiratory distress  [Clear to Auscultation] : lungs were clear to auscultation bilaterally [No Accessory Muscle Use] : no accessory muscle use [Normal Rate] : normal rate  [Regular Rhythm] : with a regular rhythm [Normal S1, S2] : normal S1 and S2 [No Murmur] : no murmur heard [Pedal Pulses Present] : the pedal pulses are present [No Edema] : there was no peripheral edema [No Extremity Clubbing/Cyanosis] : no extremity clubbing/cyanosis [Soft] : abdomen soft [Non Tender] : non-tender [Non-distended] : non-distended [No Masses] : no abdominal mass palpated [No HSM] : no HSM [Normal Bowel Sounds] : normal bowel sounds [Normal Posterior Cervical Nodes] : no posterior cervical lymphadenopathy [Normal Anterior Cervical Nodes] : no anterior cervical lymphadenopathy [No CVA Tenderness] : no CVA  tenderness [No Spinal Tenderness] : no spinal tenderness [No Joint Swelling] : no joint swelling [Grossly Normal Strength/Tone] : grossly normal strength/tone [No Rash] : no rash [Normal Gait] : normal gait [Coordination Grossly Intact] : coordination grossly intact [No Focal Deficits] : no focal deficits [Normal Affect] : the affect was normal [Normal Insight/Judgement] : insight and judgment were intact [Comprehensive Foot Exam Normal] : Right and left foot were examined and both feet are normal. No ulcers in either foot. Toes are normal and with full ROM.  Normal tactile sensation with monofilament testing throughout both feet

## 2018-10-16 DIAGNOSIS — Z23 ENCOUNTER FOR IMMUNIZATION: ICD-10-CM

## 2018-10-17 DIAGNOSIS — N84.1 POLYP OF CERVIX UTERI: ICD-10-CM

## 2018-10-17 DIAGNOSIS — F32.9 MAJOR DEPRESSIVE DISORDER, SINGLE EPISODE, UNSPECIFIED: ICD-10-CM

## 2018-10-17 DIAGNOSIS — E11.29 TYPE 2 DIABETES MELLITUS WITH OTHER DIABETIC KIDNEY COMPLICATION: ICD-10-CM

## 2018-10-17 DIAGNOSIS — E87.6 HYPOKALEMIA: ICD-10-CM

## 2018-10-17 DIAGNOSIS — M54.5 LOW BACK PAIN: ICD-10-CM

## 2018-10-17 DIAGNOSIS — E66.9 OBESITY, UNSPECIFIED: ICD-10-CM

## 2018-10-17 DIAGNOSIS — I10 ESSENTIAL (PRIMARY) HYPERTENSION: ICD-10-CM

## 2018-10-17 DIAGNOSIS — I50.30 UNSPECIFIED DIASTOLIC (CONGESTIVE) HEART FAILURE: ICD-10-CM

## 2018-10-18 LAB — GLUCOSE BLDC GLUCOMTR-MCNC: 104

## 2018-10-19 NOTE — ASSESSMENT
[FreeTextEntry1] : 53 y/o F with hx HTN, DM, diastolic HF (stage 1), and chronic lower back pain presenting for follow-up.\par \par #DM/obesity - A1c 7.1% Feb 2018 started on metformin 500 BID not always compliant (takes everyday but sometimes qday instead of BID), eliminated meat and decreased carbs from diet but unsuccessful with wt loss\par - A1c today\par - c/w metformin 500 BID, counseled patient on benefits of this medication (wt loss, BG control) and side effect profile to encourage compliance\par - diet and exercise counseling including eliminating late night snacks and increasing frequency of walking\par - referral to weight management clinic\par - optho referral\par - reviewed OTC supplement Xanolean with pt, recommended not starting this supplement rather c/w metformin and visit wt management clinic for further evaluation and management\par \par #HTN - BP at goal 140/90 today, compliant with medications, urine micro 8 mo ago\par -  c/w losartan and amlodipine\par \par #HF - Pt asymptomatic, does not follow with a cardiologist, last documented echo 2015\par - referral for repeat echo\par \par #hypokalemia - 3.1 Feb 2018, has been taking klor-con as prescribed\par - c/w klor-con\par - CMP today\par \par # Lower back pain - chronic and unchanged for the past year, pain managed with PT and naproxen prn, requesting PT referral as she would prefer to have PT in our system\par - PT referral given\par \par #Depression - no longer endorsing symptoms of depression, PHQ-2 negative\par - continue monitoring patient's mood at next visit\par \par #HCM - mammogram/US March 2018 showing septated cyst/fibrocystic nodule recommended repeat in 6 months, negative pap smear and HPV RNA, cervical polyp removed\par - flu vaccine today\par - referral for repeat mammo\par - referral for gyn f/u \par - RTC in 6 months\par \par d/w Dr. Fall\par

## 2018-10-19 NOTE — REVIEW OF SYSTEMS
[Vision Problems] : vision problems [Back Pain] : back pain [Fever] : no fever [Chills] : no chills [Fatigue] : no fatigue [Night Sweats] : no night sweats [Recent Change In Weight] : ~T no recent weight change [Discharge] : no discharge [Pain] : no pain [Redness] : no redness [Itching] : no itching [Earache] : no earache [Hoarseness] : no hoarseness [Nasal Discharge] : no nasal discharge [Sore Throat] : no sore throat [Chest Pain] : no chest pain [Palpitations] : no palpitations [Lower Ext Edema] : no lower extremity edema [Orthopnea] : no orthopnea [Paroysmal Nocturnal Dyspnea] : no paroysmal nocturnal dyspnea [Shortness Of Breath] : no shortness of breath [Wheezing] : no wheezing [Cough] : no cough [Abdominal Pain] : no abdominal pain [Nausea] : no nausea [Diarrhea] : diarrhea [Vomiting] : no vomiting [Melena] : no melena [Dysuria] : no dysuria [Incontinence] : no incontinence [Hematuria] : no hematuria [Frequency] : no frequency [Joint Stiffness] : no joint stiffness [Joint Swelling] : no joint swelling [Muscle Weakness] : no muscle weakness [Itching] : no itching [Skin Rash] : no skin rash [Headache] : no headache [Dizziness] : no dizziness [Fainting] : no fainting [Confusion] : no confusion [Unsteady Walking] : no ataxia [Suicidal] : not suicidal [Insomnia] : no insomnia [Anxiety] : no anxiety [Depression] : no depression [Easy Bleeding] : no easy bleeding [Easy Bruising] : no easy bruising [Swollen Glands] : no swollen glands [FreeTextEntry9] : (+) neck pain
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
## 2018-10-19 NOTE — END OF VISIT
[] : Resident [FreeTextEntry3] : 53F with T2DM, dCHF, HTN, asthma presenting for follow-up. Counseling provided regarding metformin, weight management referral given, TTE referral given and optho referral. Agree with plan as documented above by Dr. Borges.

## 2018-10-25 ENCOUNTER — APPOINTMENT (OUTPATIENT)
Dept: OPHTHALMOLOGY | Facility: CLINIC | Age: 53
End: 2018-10-25

## 2018-11-12 ENCOUNTER — OUTPATIENT (OUTPATIENT)
Dept: OUTPATIENT SERVICES | Facility: HOSPITAL | Age: 53
LOS: 1 days | End: 2018-11-12

## 2018-11-12 ENCOUNTER — FORM ENCOUNTER (OUTPATIENT)
Age: 53
End: 2018-11-12

## 2018-11-12 DIAGNOSIS — Z00.8 ENCOUNTER FOR OTHER GENERAL EXAMINATION: ICD-10-CM

## 2018-11-12 DIAGNOSIS — Z98.89 OTHER SPECIFIED POSTPROCEDURAL STATES: Chronic | ICD-10-CM

## 2018-11-13 ENCOUNTER — APPOINTMENT (OUTPATIENT)
Dept: MAMMOGRAPHY | Facility: IMAGING CENTER | Age: 53
End: 2018-11-13
Payer: MEDICAID

## 2018-11-13 ENCOUNTER — OUTPATIENT (OUTPATIENT)
Dept: OUTPATIENT SERVICES | Facility: HOSPITAL | Age: 53
LOS: 1 days | End: 2018-11-13
Payer: MEDICAID

## 2018-11-13 ENCOUNTER — APPOINTMENT (OUTPATIENT)
Dept: ULTRASOUND IMAGING | Facility: IMAGING CENTER | Age: 53
End: 2018-11-13

## 2018-11-13 DIAGNOSIS — Z98.89 OTHER SPECIFIED POSTPROCEDURAL STATES: Chronic | ICD-10-CM

## 2018-11-13 DIAGNOSIS — Z00.8 ENCOUNTER FOR OTHER GENERAL EXAMINATION: ICD-10-CM

## 2018-11-13 PROCEDURE — 76642 ULTRASOUND BREAST LIMITED: CPT | Mod: 26,LT

## 2018-11-13 PROCEDURE — G0279: CPT | Mod: 26

## 2018-11-13 PROCEDURE — G0279: CPT

## 2018-11-13 PROCEDURE — 77065 DX MAMMO INCL CAD UNI: CPT | Mod: 26,LT

## 2018-11-13 PROCEDURE — 76642 ULTRASOUND BREAST LIMITED: CPT

## 2018-11-13 PROCEDURE — 77065 DX MAMMO INCL CAD UNI: CPT

## 2018-11-15 ENCOUNTER — APPOINTMENT (OUTPATIENT)
Dept: OBGYN | Facility: HOSPITAL | Age: 53
End: 2018-11-15
Payer: MEDICAID

## 2018-11-15 ENCOUNTER — OUTPATIENT (OUTPATIENT)
Dept: OUTPATIENT SERVICES | Facility: HOSPITAL | Age: 53
LOS: 1 days | End: 2018-11-15

## 2018-11-15 VITALS
DIASTOLIC BLOOD PRESSURE: 93 MMHG | HEART RATE: 77 BPM | HEIGHT: 63 IN | SYSTOLIC BLOOD PRESSURE: 153 MMHG | BODY MASS INDEX: 33.31 KG/M2 | WEIGHT: 188 LBS

## 2018-11-15 DIAGNOSIS — Z98.89 OTHER SPECIFIED POSTPROCEDURAL STATES: Chronic | ICD-10-CM

## 2018-11-15 PROCEDURE — 99213 OFFICE O/P EST LOW 20 MIN: CPT | Mod: 25,GC

## 2018-11-15 PROCEDURE — 99396 PREV VISIT EST AGE 40-64: CPT | Mod: GC

## 2018-11-16 DIAGNOSIS — Z00.00 ENCOUNTER FOR GENERAL ADULT MEDICAL EXAMINATION WITHOUT ABNORMAL FINDINGS: ICD-10-CM

## 2018-11-16 DIAGNOSIS — R35.0 FREQUENCY OF MICTURITION: ICD-10-CM

## 2018-11-16 DIAGNOSIS — Z01.419 ENCOUNTER FOR GYNECOLOGICAL EXAMINATION (GENERAL) (ROUTINE) WITHOUT ABNORMAL FINDINGS: ICD-10-CM

## 2019-03-11 ENCOUNTER — RX RENEWAL (OUTPATIENT)
Age: 54
End: 2019-03-11

## 2019-06-09 ENCOUNTER — RX RENEWAL (OUTPATIENT)
Age: 54
End: 2019-06-09

## 2019-06-10 ENCOUNTER — RX RENEWAL (OUTPATIENT)
Age: 54
End: 2019-06-10

## 2019-07-05 ENCOUNTER — INPATIENT (INPATIENT)
Facility: HOSPITAL | Age: 54
LOS: 0 days | Discharge: HOME | End: 2019-07-06
Attending: INTERNAL MEDICINE | Admitting: INTERNAL MEDICINE
Payer: MEDICAID

## 2019-07-05 VITALS
SYSTOLIC BLOOD PRESSURE: 141 MMHG | HEART RATE: 88 BPM | TEMPERATURE: 98 F | DIASTOLIC BLOOD PRESSURE: 82 MMHG | RESPIRATION RATE: 18 BRPM | OXYGEN SATURATION: 100 %

## 2019-07-05 DIAGNOSIS — R07.9 CHEST PAIN, UNSPECIFIED: ICD-10-CM

## 2019-07-05 DIAGNOSIS — Z98.89 OTHER SPECIFIED POSTPROCEDURAL STATES: Chronic | ICD-10-CM

## 2019-07-05 DIAGNOSIS — I10 ESSENTIAL (PRIMARY) HYPERTENSION: ICD-10-CM

## 2019-07-05 DIAGNOSIS — E11.65 TYPE 2 DIABETES MELLITUS WITH HYPERGLYCEMIA: ICD-10-CM

## 2019-07-05 DIAGNOSIS — Z29.9 ENCOUNTER FOR PROPHYLACTIC MEASURES, UNSPECIFIED: ICD-10-CM

## 2019-07-05 LAB
ALBUMIN SERPL ELPH-MCNC: 4.3 G/DL — SIGNIFICANT CHANGE UP (ref 3.3–5)
ALP SERPL-CCNC: 81 U/L — SIGNIFICANT CHANGE UP (ref 40–120)
ALT FLD-CCNC: 28 U/L — SIGNIFICANT CHANGE UP (ref 4–33)
ANION GAP SERPL CALC-SCNC: 11 MMO/L — SIGNIFICANT CHANGE UP (ref 7–14)
APTT BLD: 30.2 SEC — SIGNIFICANT CHANGE UP (ref 27.5–36.3)
AST SERPL-CCNC: 24 U/L — SIGNIFICANT CHANGE UP (ref 4–32)
BASOPHILS # BLD AUTO: 0.03 K/UL — SIGNIFICANT CHANGE UP (ref 0–0.2)
BASOPHILS NFR BLD AUTO: 0.3 % — SIGNIFICANT CHANGE UP (ref 0–2)
BILIRUB SERPL-MCNC: 0.6 MG/DL — SIGNIFICANT CHANGE UP (ref 0.2–1.2)
BUN SERPL-MCNC: 10 MG/DL — SIGNIFICANT CHANGE UP (ref 7–23)
CALCIUM SERPL-MCNC: 9.7 MG/DL — SIGNIFICANT CHANGE UP (ref 8.4–10.5)
CHLORIDE SERPL-SCNC: 104 MMOL/L — SIGNIFICANT CHANGE UP (ref 98–107)
CK MB BLD-MCNC: 1.32 NG/ML — SIGNIFICANT CHANGE UP (ref 1–4.7)
CK SERPL-CCNC: 157 U/L — SIGNIFICANT CHANGE UP (ref 25–170)
CO2 SERPL-SCNC: 27 MMOL/L — SIGNIFICANT CHANGE UP (ref 22–31)
CREAT SERPL-MCNC: 0.62 MG/DL — SIGNIFICANT CHANGE UP (ref 0.5–1.3)
D DIMER BLD IA.RAPID-MCNC: < 150 NG/ML — SIGNIFICANT CHANGE UP
EOSINOPHIL # BLD AUTO: 0.12 K/UL — SIGNIFICANT CHANGE UP (ref 0–0.5)
EOSINOPHIL NFR BLD AUTO: 1.3 % — SIGNIFICANT CHANGE UP (ref 0–6)
GLUCOSE BLDC GLUCOMTR-MCNC: 172 MG/DL — HIGH (ref 70–99)
GLUCOSE SERPL-MCNC: 106 MG/DL — HIGH (ref 70–99)
HCT VFR BLD CALC: 35.6 % — SIGNIFICANT CHANGE UP (ref 34.5–45)
HGB BLD-MCNC: 12 G/DL — SIGNIFICANT CHANGE UP (ref 11.5–15.5)
IMM GRANULOCYTES NFR BLD AUTO: 0.3 % — SIGNIFICANT CHANGE UP (ref 0–1.5)
INR BLD: 1.05 — SIGNIFICANT CHANGE UP (ref 0.88–1.17)
LYMPHOCYTES # BLD AUTO: 3.32 K/UL — HIGH (ref 1–3.3)
LYMPHOCYTES # BLD AUTO: 34.6 % — SIGNIFICANT CHANGE UP (ref 13–44)
MCHC RBC-ENTMCNC: 29.3 PG — SIGNIFICANT CHANGE UP (ref 27–34)
MCHC RBC-ENTMCNC: 33.7 % — SIGNIFICANT CHANGE UP (ref 32–36)
MCV RBC AUTO: 86.8 FL — SIGNIFICANT CHANGE UP (ref 80–100)
MONOCYTES # BLD AUTO: 0.51 K/UL — SIGNIFICANT CHANGE UP (ref 0–0.9)
MONOCYTES NFR BLD AUTO: 5.3 % — SIGNIFICANT CHANGE UP (ref 2–14)
NEUTROPHILS # BLD AUTO: 5.59 K/UL — SIGNIFICANT CHANGE UP (ref 1.8–7.4)
NEUTROPHILS NFR BLD AUTO: 58.2 % — SIGNIFICANT CHANGE UP (ref 43–77)
NRBC # FLD: 0 K/UL — SIGNIFICANT CHANGE UP (ref 0–0)
NT-PROBNP SERPL-SCNC: 41.95 PG/ML — SIGNIFICANT CHANGE UP
PLATELET # BLD AUTO: 330 K/UL — SIGNIFICANT CHANGE UP (ref 150–400)
PMV BLD: 9.7 FL — SIGNIFICANT CHANGE UP (ref 7–13)
POTASSIUM SERPL-MCNC: 3.7 MMOL/L — SIGNIFICANT CHANGE UP (ref 3.5–5.3)
POTASSIUM SERPL-SCNC: 3.7 MMOL/L — SIGNIFICANT CHANGE UP (ref 3.5–5.3)
PROT SERPL-MCNC: 8.2 G/DL — SIGNIFICANT CHANGE UP (ref 6–8.3)
PROTHROM AB SERPL-ACNC: 11.7 SEC — SIGNIFICANT CHANGE UP (ref 9.8–13.1)
RBC # BLD: 4.1 M/UL — SIGNIFICANT CHANGE UP (ref 3.8–5.2)
RBC # FLD: 12.3 % — SIGNIFICANT CHANGE UP (ref 10.3–14.5)
SODIUM SERPL-SCNC: 142 MMOL/L — SIGNIFICANT CHANGE UP (ref 135–145)
TROPONIN T, HIGH SENSITIVITY: 9 NG/L — SIGNIFICANT CHANGE UP (ref ?–14)
WBC # BLD: 9.6 K/UL — SIGNIFICANT CHANGE UP (ref 3.8–10.5)
WBC # FLD AUTO: 9.6 K/UL — SIGNIFICANT CHANGE UP (ref 3.8–10.5)

## 2019-07-05 PROCEDURE — 99223 1ST HOSP IP/OBS HIGH 75: CPT

## 2019-07-05 PROCEDURE — 71046 X-RAY EXAM CHEST 2 VIEWS: CPT | Mod: 26

## 2019-07-05 RX ORDER — METFORMIN HYDROCHLORIDE 850 MG/1
1 TABLET ORAL
Qty: 0 | Refills: 0 | DISCHARGE

## 2019-07-05 RX ORDER — INSULIN LISPRO 100/ML
VIAL (ML) SUBCUTANEOUS AT BEDTIME
Refills: 0 | Status: DISCONTINUED | OUTPATIENT
Start: 2019-07-05 | End: 2019-07-06

## 2019-07-05 RX ORDER — AMLODIPINE BESYLATE 2.5 MG/1
10 TABLET ORAL DAILY
Refills: 0 | Status: DISCONTINUED | OUTPATIENT
Start: 2019-07-05 | End: 2019-07-06

## 2019-07-05 RX ORDER — DEXTROSE 50 % IN WATER 50 %
25 SYRINGE (ML) INTRAVENOUS ONCE
Refills: 0 | Status: DISCONTINUED | OUTPATIENT
Start: 2019-07-05 | End: 2019-07-06

## 2019-07-05 RX ORDER — ASPIRIN/CALCIUM CARB/MAGNESIUM 324 MG
81 TABLET ORAL ONCE
Refills: 0 | Status: COMPLETED | OUTPATIENT
Start: 2019-07-05 | End: 2019-07-05

## 2019-07-05 RX ORDER — INSULIN LISPRO 100/ML
VIAL (ML) SUBCUTANEOUS
Refills: 0 | Status: DISCONTINUED | OUTPATIENT
Start: 2019-07-05 | End: 2019-07-06

## 2019-07-05 RX ORDER — HEPARIN SODIUM 5000 [USP'U]/ML
5000 INJECTION INTRAVENOUS; SUBCUTANEOUS EVERY 12 HOURS
Refills: 0 | Status: DISCONTINUED | OUTPATIENT
Start: 2019-07-05 | End: 2019-07-06

## 2019-07-05 RX ORDER — DEXTROSE 50 % IN WATER 50 %
15 SYRINGE (ML) INTRAVENOUS ONCE
Refills: 0 | Status: DISCONTINUED | OUTPATIENT
Start: 2019-07-05 | End: 2019-07-06

## 2019-07-05 RX ORDER — GLUCAGON INJECTION, SOLUTION 0.5 MG/.1ML
1 INJECTION, SOLUTION SUBCUTANEOUS ONCE
Refills: 0 | Status: DISCONTINUED | OUTPATIENT
Start: 2019-07-05 | End: 2019-07-06

## 2019-07-05 RX ORDER — SODIUM CHLORIDE 9 MG/ML
1000 INJECTION, SOLUTION INTRAVENOUS
Refills: 0 | Status: DISCONTINUED | OUTPATIENT
Start: 2019-07-05 | End: 2019-07-06

## 2019-07-05 RX ORDER — FLUTICASONE PROPIONATE 50 MCG
1 SPRAY, SUSPENSION NASAL
Qty: 0 | Refills: 0 | DISCHARGE

## 2019-07-05 RX ORDER — ASPIRIN/CALCIUM CARB/MAGNESIUM 324 MG
81 TABLET ORAL DAILY
Refills: 0 | Status: DISCONTINUED | OUTPATIENT
Start: 2019-07-05 | End: 2019-07-06

## 2019-07-05 RX ORDER — AMLODIPINE BESYLATE 2.5 MG/1
1 TABLET ORAL
Qty: 0 | Refills: 0 | DISCHARGE

## 2019-07-05 RX ORDER — DEXTROSE 50 % IN WATER 50 %
12.5 SYRINGE (ML) INTRAVENOUS ONCE
Refills: 0 | Status: DISCONTINUED | OUTPATIENT
Start: 2019-07-05 | End: 2019-07-06

## 2019-07-05 RX ADMIN — Medication 81 MILLIGRAM(S): at 15:16

## 2019-07-05 NOTE — H&P ADULT - PROBLEM SELECTOR PLAN 2
On insulin sliding scale(humalog)  FS TID at bedtime, HgBA1C in am On insulin sliding scale (humalog)  FS TID at bedtime, HgBA1C in am

## 2019-07-05 NOTE — ED PROVIDER NOTE - OBJECTIVE STATEMENT
53yo F with hx of HTN/HLD/hypothyroid presents today with chest pain. L sided chest pain started 3 days ago, now worsening and radiating to the L arm and L jaw. Associated with SOB. Exertional SOB/CP. No prior work up by cardiologist.

## 2019-07-05 NOTE — H&P ADULT - NEGATIVE OPHTHALMOLOGIC SYMPTOMS
no diplopia/no photophobia/no discharge L/no discharge R/no blurred vision L/no blurred vision R/no lacrimation L/no lacrimation R

## 2019-07-05 NOTE — H&P ADULT - NSHPSOCIALHISTORY_GEN_ALL_CORE
Single, lives with daughter, HHA  Never smoked/but drinks rum occasionally and last drink was on [place]

## 2019-07-05 NOTE — ED ADULT NURSE REASSESSMENT NOTE - NS ED NURSE REASSESS COMMENT FT1
Pt A&Ox3 c/o chest pain to left shoulder labs were sent IVL in left AC cxry done pt on cardiac monitor pending results

## 2019-07-05 NOTE — H&P ADULT - NEGATIVE MUSCULOSKELETAL SYMPTOMS
no stiffness/no muscle cramps/no arthralgia/no myalgia/no joint swelling/no muscle weakness/no arthritis/no neck pain

## 2019-07-05 NOTE — ED PROVIDER NOTE - CLINICAL SUMMARY MEDICAL DECISION MAKING FREE TEXT BOX
Chest pain 3 days, worsening. exertional CP/associated with SOB. no previous work up by cardiologist. CXR, labs including troponin/probnp. aspirin Mihailos att: Chest pain 3 days, worsening. exertional CP/associated with SOB. no previous work up by cardiologist. CXR, labs including troponin/probnp, aspirin and admission to r/o ACS.

## 2019-07-05 NOTE — ED ADULT NURSE NOTE - OBJECTIVE STATEMENT
Pt,. A&Ox3, c/o intermittent chest pain radiating to b/l shoulders with some numbness to fingers x 3 days. Pain only with movement/cough. Also c/o sob and blurry vision. Denies dizziness, palpitations, or n/v. #20g IV placed in left AC, labs sent. VSS, breathing well on RA. Respirations even and unlabored. Will continue to monitor.

## 2019-07-05 NOTE — H&P ADULT - GASTROINTESTINAL DETAILS
no rigidity/no guarding/bowel sounds normal/soft/nontender/no distention/no rebound tenderness/normal
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
## 2019-07-05 NOTE — H&P ADULT - NSHPLABSRESULTS_GEN_ALL_CORE
12.0   9.60  )-----------( 330      ( 05 Jul 2019 15:25 )             35.6     07-05    142  |  104  |  10  ----------------------------<  106<H>  3.7   |  27  |  0.62    Ca    9.7      05 Jul 2019 15:25    TPro  8.2  /  Alb  4.3  /  TBili  0.6  /  DBili  x   /  AST  24  /  ALT  28  /  AlkPhos  81  07-05    CXR: Clear lungs. No pleural effusions or pneumothorax. Cardiac and mediastinal silhouettes within normal limits. Trachea midline. Unremarkable osseous structures.    EKG: NSR at a rate of 85 with possible LAE and QTc of 461 12.0   9.60  )-----------( 330      ( 05 Jul 2019 15:25 )             35.6     07-05    142  |  104  |  10  ----------------------------<  106<H>  3.7   |  27  |  0.62    Ca    9.7      05 Jul 2019 15:25    TPro  8.2  /  Alb  4.3  /  TBili  0.6  /  DBili  x   /  AST  24  /  ALT  28  /  AlkPhos  81  07-05    Imaging personally reviewed.  CXR: Clear lungs. No pleural effusions or pneumothorax. Cardiac and mediastinal silhouettes within normal limits. Trachea midline. Unremarkable osseous structures.    EKG: NSR at a rate of 85 with possible LAE and QTc of 461

## 2019-07-05 NOTE — H&P ADULT - NSHPLANGLIMITEDENGLISH_GEN_A_CORE
----- Message from Lisy Kwan sent at 2/6/2017  9:12 AM CST -----  Contact: patient  Calling concerning the Lisinopril given. States she have rapid heart beat and drop in BP while on medication. Please call patient ASAP @ [phone]. Thanks, jose francisco   No

## 2019-07-05 NOTE — H&P ADULT - NEGATIVE NEUROLOGICAL SYMPTOMS
no generalized seizures/no tremors/no loss of sensation/no syncope/no vertigo/no hemiparesis/no difficulty walking/no weakness/no loss of consciousness/no confusion/no transient paralysis/no paresthesias/no focal seizures

## 2019-07-05 NOTE — H&P ADULT - NEGATIVE ENMT SYMPTOMS
no vertigo/no nasal congestion/no tinnitus/no sinus symptoms/no hearing difficulty/no ear pain/no nasal discharge

## 2019-07-05 NOTE — H&P ADULT - RS GEN PE MLT RESP DETAILS PC
clear to auscultation bilaterally/good air movement/respirations non-labored/no chest wall tenderness/no wheezes/no intercostal retractions/no rales/no rhonchi/normal/airway patent/breath sounds equal

## 2019-07-05 NOTE — H&P ADULT - ASSESSMENT
55 y/o F with PMH of HTN, DM type II presented with the complaint of left sided chest pain and SOB. R/o ACS

## 2019-07-05 NOTE — H&P ADULT - HISTORY OF PRESENT ILLNESS
55 y/o F with PMH of HTN, DM type II presented with the complaint of left sided chest pain and SOB. As per the patient she developed initially left arm pain on Wednesday which woke her up from sleep at around 2:00am. Patient stated that the arm pain subsided and then she went to sleep only to wake up with left sided chest pain. Patient then took a baby Aspirin which helped bring the chest pain down a little. Patient stated that the chest pain has been intermittent, characterized as a sharp pain, aggravated when she would move or with exertion, mild relief after taking aspirin, with radiation of the pain to both the jaw and both elbows, with a severity of 5/10. Patient stated that she has had chest pain like this before but never had shoulder and jaw pain. Patient stated that with the chest pain she also had SOB and MCCARTY. Patient denied any fevers, chills, N/V/D/C, abdominal pain, dysuria, melena, hematochezia, sick contact, pleuritic or positional chest pain. Of note patient came from [place] last week.     On ED admission EKG revealed NSR at a rate of 85 with possible LAE and QTc of 461, CE x 1: Trop: 9, CE x 2: Trop: 9, CE x 3: Trop: 9, D-dimer: Negative, Gluc: 106. CXR: Clear lungs. No pleural effusions or pneumothorax. Cardiac and mediastinal silhouettes within normal limits. Trachea midline. Unremarkable osseous structures. In the ED patient received Aspirin 81mg. When examined patient is resting in the stretcher and denied any current chest pain or SOB.

## 2019-07-05 NOTE — H&P ADULT - NEGATIVE GASTROINTESTINAL SYMPTOMS
no nausea/no hematochezia/no change in bowel habits/no abdominal pain/no vomiting/no melena/no constipation

## 2019-07-05 NOTE — H&P ADULT - PROBLEM SELECTOR PLAN 1
Will monitor on telemetry, serial EKG and Nguyen prn for any mor episodes of chest pain   HgbA1C, TSH, lipid profile, CBC, CMP in am   Consider ischemic workup with NST this admission   TTE ordered   Started on Aspirin 81mg daily Will monitor on telemetry, serial EKG and Nguyen prn for any mor episodes of chest pain   HgbA1C, TSH, lipid profile, CBC, CMP in am   Consider ischemic workup with NST this admission   TTE ordered   Started on Aspirin 81mg daily  Consider cardiology consult in am Will monitor on telemetry, serial EKG and Nguyen prn for any more episodes of chest pain   HgbA1C, TSH, lipid profile, CBC, CMP in am   Consider ischemic workup with NST this admission   TTE ordered   Started on Aspirin 81mg daily  Consider cardiology consult in am

## 2019-07-06 ENCOUNTER — TRANSCRIPTION ENCOUNTER (OUTPATIENT)
Age: 54
End: 2019-07-06

## 2019-07-06 VITALS
OXYGEN SATURATION: 97 % | DIASTOLIC BLOOD PRESSURE: 84 MMHG | HEART RATE: 80 BPM | TEMPERATURE: 98 F | RESPIRATION RATE: 16 BRPM | SYSTOLIC BLOOD PRESSURE: 122 MMHG

## 2019-07-06 DIAGNOSIS — I20.8 OTHER FORMS OF ANGINA PECTORIS: ICD-10-CM

## 2019-07-06 LAB
ANION GAP SERPL CALC-SCNC: 16 MMO/L — HIGH (ref 7–14)
BUN SERPL-MCNC: 9 MG/DL — SIGNIFICANT CHANGE UP (ref 7–23)
CALCIUM SERPL-MCNC: 9.8 MG/DL — SIGNIFICANT CHANGE UP (ref 8.4–10.5)
CHLORIDE SERPL-SCNC: 99 MMOL/L — SIGNIFICANT CHANGE UP (ref 98–107)
CHOLEST SERPL-MCNC: 183 MG/DL — SIGNIFICANT CHANGE UP (ref 120–199)
CO2 SERPL-SCNC: 26 MMOL/L — SIGNIFICANT CHANGE UP (ref 22–31)
CREAT SERPL-MCNC: 0.6 MG/DL — SIGNIFICANT CHANGE UP (ref 0.5–1.3)
GLUCOSE BLDC GLUCOMTR-MCNC: 118 MG/DL — HIGH (ref 70–99)
GLUCOSE BLDC GLUCOMTR-MCNC: 189 MG/DL — HIGH (ref 70–99)
GLUCOSE SERPL-MCNC: 133 MG/DL — HIGH (ref 70–99)
HBA1C BLD-MCNC: 6.7 % — HIGH (ref 4–5.6)
HCG SERPL-ACNC: < 5 MIU/ML — SIGNIFICANT CHANGE UP
HCT VFR BLD CALC: 40.3 % — SIGNIFICANT CHANGE UP (ref 34.5–45)
HDLC SERPL-MCNC: 56 MG/DL — SIGNIFICANT CHANGE UP (ref 45–65)
HGB BLD-MCNC: 13.3 G/DL — SIGNIFICANT CHANGE UP (ref 11.5–15.5)
LIPID PNL WITH DIRECT LDL SERPL: 123 MG/DL — SIGNIFICANT CHANGE UP
MAGNESIUM SERPL-MCNC: 1.8 MG/DL — SIGNIFICANT CHANGE UP (ref 1.6–2.6)
MCHC RBC-ENTMCNC: 28.5 PG — SIGNIFICANT CHANGE UP (ref 27–34)
MCHC RBC-ENTMCNC: 33 % — SIGNIFICANT CHANGE UP (ref 32–36)
MCV RBC AUTO: 86.3 FL — SIGNIFICANT CHANGE UP (ref 80–100)
NRBC # FLD: 0 K/UL — SIGNIFICANT CHANGE UP (ref 0–0)
PHOSPHATE SERPL-MCNC: 3.3 MG/DL — SIGNIFICANT CHANGE UP (ref 2.5–4.5)
PLATELET # BLD AUTO: 341 K/UL — SIGNIFICANT CHANGE UP (ref 150–400)
PMV BLD: 9.5 FL — SIGNIFICANT CHANGE UP (ref 7–13)
POTASSIUM SERPL-MCNC: 3.3 MMOL/L — LOW (ref 3.5–5.3)
POTASSIUM SERPL-SCNC: 3.3 MMOL/L — LOW (ref 3.5–5.3)
RBC # BLD: 4.67 M/UL — SIGNIFICANT CHANGE UP (ref 3.8–5.2)
RBC # FLD: 12.2 % — SIGNIFICANT CHANGE UP (ref 10.3–14.5)
SODIUM SERPL-SCNC: 141 MMOL/L — SIGNIFICANT CHANGE UP (ref 135–145)
TRIGL SERPL-MCNC: 94 MG/DL — SIGNIFICANT CHANGE UP (ref 10–149)
TSH SERPL-MCNC: 1.62 UIU/ML — SIGNIFICANT CHANGE UP (ref 0.27–4.2)
WBC # BLD: 8.61 K/UL — SIGNIFICANT CHANGE UP (ref 3.8–10.5)
WBC # FLD AUTO: 8.61 K/UL — SIGNIFICANT CHANGE UP (ref 3.8–10.5)

## 2019-07-06 PROCEDURE — 99239 HOSP IP/OBS DSCHRG MGMT >30: CPT

## 2019-07-06 PROCEDURE — 93306 TTE W/DOPPLER COMPLETE: CPT | Mod: 26

## 2019-07-06 RX ORDER — ASPIRIN/CALCIUM CARB/MAGNESIUM 324 MG
1 TABLET ORAL
Qty: 30 | Refills: 0
Start: 2019-07-06 | End: 2019-08-04

## 2019-07-06 RX ORDER — PANTOPRAZOLE SODIUM 20 MG/1
1 TABLET, DELAYED RELEASE ORAL
Qty: 30 | Refills: 0
Start: 2019-07-06 | End: 2019-08-04

## 2019-07-06 RX ORDER — CYCLOBENZAPRINE HYDROCHLORIDE 10 MG/1
1 TABLET, FILM COATED ORAL
Qty: 12 | Refills: 0
Start: 2019-07-06 | End: 2019-07-09

## 2019-07-06 RX ORDER — ASPIRIN/CALCIUM CARB/MAGNESIUM 324 MG
325 TABLET ORAL DAILY
Refills: 0 | Status: DISCONTINUED | OUTPATIENT
Start: 2019-07-06 | End: 2019-07-06

## 2019-07-06 RX ORDER — PANTOPRAZOLE SODIUM 20 MG/1
40 TABLET, DELAYED RELEASE ORAL
Refills: 0 | Status: DISCONTINUED | OUTPATIENT
Start: 2019-07-06 | End: 2019-07-06

## 2019-07-06 RX ORDER — CYCLOBENZAPRINE HYDROCHLORIDE 10 MG/1
5 TABLET, FILM COATED ORAL THREE TIMES A DAY
Refills: 0 | Status: DISCONTINUED | OUTPATIENT
Start: 2019-07-06 | End: 2019-07-06

## 2019-07-06 RX ORDER — POTASSIUM CHLORIDE 20 MEQ
40 PACKET (EA) ORAL ONCE
Refills: 0 | Status: COMPLETED | OUTPATIENT
Start: 2019-07-06 | End: 2019-07-06

## 2019-07-06 RX ORDER — ASPIRIN/CALCIUM CARB/MAGNESIUM 324 MG
1 TABLET ORAL
Qty: 0 | Refills: 0 | DISCHARGE
Start: 2019-07-06

## 2019-07-06 RX ADMIN — Medication 81 MILLIGRAM(S): at 11:05

## 2019-07-06 RX ADMIN — Medication 40 MILLIEQUIVALENT(S): at 11:04

## 2019-07-06 RX ADMIN — PANTOPRAZOLE SODIUM 40 MILLIGRAM(S): 20 TABLET, DELAYED RELEASE ORAL at 11:05

## 2019-07-06 RX ADMIN — AMLODIPINE BESYLATE 10 MILLIGRAM(S): 2.5 TABLET ORAL at 05:17

## 2019-07-06 RX ADMIN — CYCLOBENZAPRINE HYDROCHLORIDE 5 MILLIGRAM(S): 10 TABLET, FILM COATED ORAL at 11:05

## 2019-07-06 NOTE — DISCHARGE NOTE PROVIDER - PROVIDER TOKENS
FREE:[LAST:[[facility] Medicine clinic],PHONE:[phone]],FAX:[(   )    -]] FREE:[LAST:[[facility] Medicine clinic],PHONE:[phone]],FAX:[(   )    -]],PROVIDER:[TOKEN:[[id number]]]

## 2019-07-06 NOTE — DISCHARGE NOTE PROVIDER - NSDCCPCAREPLAN_GEN_ALL_CORE_FT
PRINCIPAL DISCHARGE DIAGNOSIS  Diagnosis: Chest pain  Assessment and Plan of Treatment: No evidence of acute cardiac event. Symptoms ikely secondary to gastric reflux. Continue Protonix, follow up with your primary care doctor for monitoring.      SECONDARY DISCHARGE DIAGNOSES  Diagnosis: Neck pain  Assessment and Plan of Treatment: Continue flexeril as needed for muscle spasm. Follow up with your primary care physician for further monitoring in 1-2 weeks. Please call to arrange appointment. PRINCIPAL DISCHARGE DIAGNOSIS  Diagnosis: Chest pain  Assessment and Plan of Treatment: No evidence of acute cardiac event. Symptoms ikely secondary to gastric reflux. Continue Protonix, follow up with your primary care doctor for monitoring.      SECONDARY DISCHARGE DIAGNOSES  Diagnosis: Essential hypertension  Assessment and Plan of Treatment: Low sodium and fat diet, continue anti-hypertensive medications, and follow up with primary care physician.    Diagnosis: Mild left ventricular systolic dysfunction  Assessment and Plan of Treatment: Please follow up with Cardiology clinic within 2 weeks, please call for appointment.    Diagnosis: Neck pain  Assessment and Plan of Treatment: Continue flexeril as needed for muscle spasm. Follow up with your primary care physician for further monitoring in 1-2 weeks. Please call to arrange appointment. PRINCIPAL DISCHARGE DIAGNOSIS  Diagnosis: Chest pain  Assessment and Plan of Treatment: No evidence of acute cardiac event. Symptoms ikely secondary to gastric reflux. Continue Protonix, follow up with your primary care doctor for monitoring.      SECONDARY DISCHARGE DIAGNOSES  Diagnosis: Essential hypertension  Assessment and Plan of Treatment: Low sodium and fat diet, continue anti-hypertensive medications, and follow up with primary care physician.    Diagnosis: Mild left ventricular systolic dysfunction  Assessment and Plan of Treatment: Please follow up with Cardiology clinic within 2 weeks, please call for appointment. Continue aspirin 325mg as prescribed.    Diagnosis: Neck pain  Assessment and Plan of Treatment: Continue flexeril as needed for muscle spasm. Follow up with your primary care physician for further monitoring in 1-2 weeks. Please call to arrange appointment. PRINCIPAL DISCHARGE DIAGNOSIS  Diagnosis: Chest pain  Assessment and Plan of Treatment: No evidence of acute cardiac event. Symptoms ikely secondary to gastric reflux. Continue Protonix, follow up with your primary care doctor for monitoring.      SECONDARY DISCHARGE DIAGNOSES  Diagnosis: Essential hypertension  Assessment and Plan of Treatment: Low sodium and fat diet, continue anti-hypertensive medications, and follow up with primary care physician.    Diagnosis: Mild left ventricular systolic dysfunction  Assessment and Plan of Treatment: Please follow up with Cardiology clinic within 2 weeks, please call for appointment. Continue aspirin 325mg as prescribed.    Diagnosis: Neck pain  Assessment and Plan of Treatment: Continue flexeril as needed for muscle spasm. Follow up with your primary care physician for further monitoring in 1-2 weeks. Please call to arrange appointment.    Diagnosis: Type 2 diabetes mellitus with hyperglycemia, without long-term current use of insulin  Assessment and Plan of Treatment: Continue Metformin, low salt, fat and carbohydrate diet, minimize glucose intake.  Exercise daily for at least 30 minutes and weight loss.  Follow up with primary care physician and endocrinologist for routine Hemoglobin A1C checks and management.  Follow up with your ophthalmologist for routine yearly vision exams.

## 2019-07-06 NOTE — DISCHARGE NOTE NURSING/CASE MANAGEMENT/SOCIAL WORK - NSDCDPATPORTLINK_GEN_ALL_CORE
You can access the [facility] Patient Portal, offered by [facility], by registering with the following website: [email or web address]

## 2019-07-06 NOTE — CHART NOTE - NSCHARTNOTEFT_GEN_A_CORE
pt seen and examined by me  pt reports chest discomfort for the last week  also reports neck pain/tenderness after "injury on the job"  pt was concerned because CP and L arm/jaw pain so came to ED for eval  trops unremarkable  pt now describes poss reflux type symptoms  will trial PPI and flexeril for neck pain  if sx improve will dc home  pt to f/u in [facility] medical clinic  pt is agreeable to this plan  VSS  CHEST CTA  HEART reg  ABD+ epigastric tenderness    a/w atypical CP  suspect GI and musck related  if sx improve with above  dc home  31 min spent with dc planning

## 2019-07-06 NOTE — DISCHARGE NOTE PROVIDER - CARE PROVIDERS DIRECT ADDRESSES
,DirectAddress_Unknown ,DirectAddress_Unknown,[email or web address]

## 2019-07-06 NOTE — DISCHARGE NOTE PROVIDER - CARE PROVIDER_API CALL
[facility] Medicine clinic,   Phone: [phone]  Fax: (   )    -  Follow Up Time: [facility],   Phone: [phone]  Fax: (   )    -  Follow Up Time:     Mihaela De Oliveira)  Cardiovascular Disease  [facility], [address] [place] [address]  Phone: [phone]  Fax: [phone]  Follow Up Time:

## 2019-07-09 ENCOUNTER — APPOINTMENT (OUTPATIENT)
Dept: ULTRASOUND IMAGING | Facility: IMAGING CENTER | Age: 54
End: 2019-07-09

## 2019-07-09 ENCOUNTER — APPOINTMENT (OUTPATIENT)
Dept: MAMMOGRAPHY | Facility: IMAGING CENTER | Age: 54
End: 2019-07-09

## 2019-07-09 PROBLEM — E11.9 TYPE 2 DIABETES MELLITUS WITHOUT COMPLICATIONS: Chronic | Status: ACTIVE | Noted: 2019-07-05

## 2019-07-22 ENCOUNTER — RX RENEWAL (OUTPATIENT)
Age: 54
End: 2019-07-22

## 2019-07-23 ENCOUNTER — LABORATORY RESULT (OUTPATIENT)
Age: 54
End: 2019-07-23

## 2019-07-23 ENCOUNTER — OUTPATIENT (OUTPATIENT)
Dept: OUTPATIENT SERVICES | Facility: HOSPITAL | Age: 54
LOS: 1 days | End: 2019-07-23

## 2019-07-23 ENCOUNTER — APPOINTMENT (OUTPATIENT)
Dept: INTERNAL MEDICINE | Facility: CLINIC | Age: 54
End: 2019-07-23
Payer: MEDICAID

## 2019-07-23 VITALS — HEART RATE: 76 BPM | SYSTOLIC BLOOD PRESSURE: 146 MMHG | DIASTOLIC BLOOD PRESSURE: 82 MMHG

## 2019-07-23 VITALS — HEIGHT: 63 IN | OXYGEN SATURATION: 99 % | BODY MASS INDEX: 31.89 KG/M2 | HEART RATE: 73 BPM | WEIGHT: 180 LBS

## 2019-07-23 DIAGNOSIS — Z98.89 OTHER SPECIFIED POSTPROCEDURAL STATES: Chronic | ICD-10-CM

## 2019-07-23 LAB
ALBUMIN SERPL ELPH-MCNC: 4.2 G/DL — SIGNIFICANT CHANGE UP (ref 3.3–5)
ALP SERPL-CCNC: 70 U/L — SIGNIFICANT CHANGE UP (ref 40–120)
ALT FLD-CCNC: 28 U/L — SIGNIFICANT CHANGE UP (ref 4–33)
ANION GAP SERPL CALC-SCNC: 12 MMO/L — SIGNIFICANT CHANGE UP (ref 7–14)
AST SERPL-CCNC: 27 U/L — SIGNIFICANT CHANGE UP (ref 4–32)
BILIRUB SERPL-MCNC: 0.6 MG/DL — SIGNIFICANT CHANGE UP (ref 0.2–1.2)
BUN SERPL-MCNC: 11 MG/DL — SIGNIFICANT CHANGE UP (ref 7–23)
CALCIUM SERPL-MCNC: 9.9 MG/DL — SIGNIFICANT CHANGE UP (ref 8.4–10.5)
CHLORIDE SERPL-SCNC: 102 MMOL/L — SIGNIFICANT CHANGE UP (ref 98–107)
CO2 SERPL-SCNC: 30 MMOL/L — SIGNIFICANT CHANGE UP (ref 22–31)
CREAT SERPL-MCNC: 0.66 MG/DL — SIGNIFICANT CHANGE UP (ref 0.5–1.3)
GLUCOSE SERPL-MCNC: 226 MG/DL — HIGH (ref 70–99)
POTASSIUM SERPL-MCNC: 3.7 MMOL/L — SIGNIFICANT CHANGE UP (ref 3.5–5.3)
POTASSIUM SERPL-SCNC: 3.7 MMOL/L — SIGNIFICANT CHANGE UP (ref 3.5–5.3)
PROT SERPL-MCNC: 8 G/DL — SIGNIFICANT CHANGE UP (ref 6–8.3)
SODIUM SERPL-SCNC: 144 MMOL/L — SIGNIFICANT CHANGE UP (ref 135–145)

## 2019-07-23 PROCEDURE — 99214 OFFICE O/P EST MOD 30 MIN: CPT | Mod: GC

## 2019-07-23 NOTE — PHYSICAL EXAM
[No Acute Distress] : no acute distress [Well Nourished] : well nourished [Well Developed] : well developed [Well-Appearing] : well-appearing [Normal Sclera/Conjunctiva] : normal sclera/conjunctiva [Fundoscopic Exam Performed] : fundoscopic ~T exam ~C was performed [Normal Outer Ear/Nose] : the outer ears and nose were normal in appearance [No JVD] : no jugular venous distention [Supple] : supple [No Respiratory Distress] : no respiratory distress  [Clear to Auscultation] : lungs were clear to auscultation bilaterally [Normal Rate] : normal rate  [Regular Rhythm] : with a regular rhythm [Normal S1, S2] : normal S1 and S2 [No Murmur] : no murmur heard [Soft] : abdomen soft [No Edema] : there was no peripheral edema [Non Tender] : non-tender [No Rash] : no rash [Normal Affect] : the affect was normal [Normal Mood] : the mood was normal [Comprehensive Foot Exam Normal] : Right and left foot were examined and both feet are normal. No ulcers in either foot. Toes are normal and with full ROM.  Normal tactile sensation with monofilament testing throughout both feet

## 2019-07-24 LAB
CREAT UR-MCNC: 30 MG/DL — SIGNIFICANT CHANGE UP
MICROALBUMIN UR-MCNC: 2.1 MG/DL — SIGNIFICANT CHANGE UP
MICROALBUMIN/CREAT UR-RTO: 70 MG/G — HIGH (ref 0–30)

## 2019-07-25 DIAGNOSIS — E11.29 TYPE 2 DIABETES MELLITUS WITH OTHER DIABETIC KIDNEY COMPLICATION: ICD-10-CM

## 2019-07-25 DIAGNOSIS — K21.9 GASTRO-ESOPHAGEAL REFLUX DISEASE WITHOUT ESOPHAGITIS: ICD-10-CM

## 2019-07-25 DIAGNOSIS — E87.6 HYPOKALEMIA: ICD-10-CM

## 2019-07-25 DIAGNOSIS — R07.89 OTHER CHEST PAIN: ICD-10-CM

## 2019-07-25 DIAGNOSIS — I10 ESSENTIAL (PRIMARY) HYPERTENSION: ICD-10-CM

## 2019-07-25 DIAGNOSIS — R10.9 UNSPECIFIED ABDOMINAL PAIN: ICD-10-CM

## 2019-07-25 NOTE — ASSESSMENT
[FreeTextEntry1] : 53 y/o F with PMH of HTN, DM type II, diastolic HF (stage 1) presenting here for a post hospital discharge appointment\par \par #Chest/abdominal pain\par -Gastritis vs PUD vs GERD, less likely cardiac as ACS w/u negative\par -Famotidine 20 BID sent to pharmacy, GI referral given \par -Pt has cardiology f/u scheduled for August\par \par #SOB\par -Pt has SOB with strenuous exertion but does not have SOB at baseline, is able to carry out ADLs, can climb multiple flights of stairs\par -Complains of orthopnea for years with 7 pillows that she places under her body so that she can sleep on an incline; says that she has non-productive cough at night, this occurs rarely\par -no clinical evidence of decompensated heart failure at this time\par \par #DM2\par -On 7/6/19 A1C 6.7\par -c/w metformin 500 BID\par -Opthalmology and podiatry referrals given\par -urine microalbumin sent\par \par #HTN\par -BP on Nov 2018 153/93, today at 146/82\par -currently on amlodipine 10mg daily, previously on losartan 100mg daily which pt stopped a few weeks ago due to concern for side effects of chest pain; will start on lisinopril 5 daily today\par -BP machine sent to pharmacy\par -pt noted to be hypokalemic during hospital stay, CMP ordered\par \par #Mammogram screening\par -BIRADS 3 in  11/13/2018, with scattered areas of fibroglandular density noted, follow up needed\par -Mammo referral given\par \par RTC in 5 weeks\par \par Discussed with \par

## 2019-07-25 NOTE — REVIEW OF SYSTEMS
[Night Sweats] : night sweats [Recent Change In Weight] : ~T recent weight change [Chest Pain] : chest pain [Palpitations] : palpitations [Abdominal Pain] : abdominal pain [Nausea] : nausea [Joint Pain] : joint pain [Memory Loss] : memory loss [Fever] : no fever [Chills] : no chills [Discharge] : no discharge [Pain] : no pain [Earache] : no earache [Hearing Loss] : no hearing loss [Leg Claudication] : no leg claudication [Lower Ext Edema] : no lower extremity edema [Constipation] : no constipation [Diarrhea] : diarrhea [Vomiting] : no vomiting [Heartburn] : no heartburn [Dysuria] : no dysuria [Hematuria] : no hematuria [Vaginal Discharge] : no vaginal discharge [Skin Rash] : no skin rash [Fainting] : no fainting [Confusion] : no confusion [Unsteady Walking] : no ataxia [Suicidal] : not suicidal [Depression] : no depression [FreeTextEntry3] : fogginess of vision for a few months [FreeTextEntry7] : nausea w/ rapid head turning on occasion w/o associated falls, none currently in office [de-identified] : occasional memory loss, sometimes forgets to take once or twice a week

## 2019-08-06 ENCOUNTER — APPOINTMENT (OUTPATIENT)
Dept: CARDIOLOGY | Facility: HOSPITAL | Age: 54
End: 2019-08-06

## 2019-08-13 ENCOUNTER — RX RENEWAL (OUTPATIENT)
Age: 54
End: 2019-08-13

## 2019-08-26 ENCOUNTER — APPOINTMENT (OUTPATIENT)
Dept: INTERNAL MEDICINE | Facility: CLINIC | Age: 54
End: 2019-08-26
Payer: MEDICAID

## 2019-08-26 ENCOUNTER — OUTPATIENT (OUTPATIENT)
Dept: OUTPATIENT SERVICES | Facility: HOSPITAL | Age: 54
LOS: 1 days | End: 2019-08-26

## 2019-08-26 VITALS — BODY MASS INDEX: 31.89 KG/M2 | HEART RATE: 85 BPM | HEIGHT: 63 IN | OXYGEN SATURATION: 98 % | WEIGHT: 180 LBS

## 2019-08-26 VITALS — SYSTOLIC BLOOD PRESSURE: 164 MMHG | HEART RATE: 81 BPM | DIASTOLIC BLOOD PRESSURE: 110 MMHG

## 2019-08-26 DIAGNOSIS — Z98.89 OTHER SPECIFIED POSTPROCEDURAL STATES: Chronic | ICD-10-CM

## 2019-08-26 DIAGNOSIS — R07.89 OTHER CHEST PAIN: ICD-10-CM

## 2019-08-26 PROCEDURE — 99214 OFFICE O/P EST MOD 30 MIN: CPT | Mod: GC

## 2019-08-27 ENCOUNTER — APPOINTMENT (OUTPATIENT)
Dept: OPHTHALMOLOGY | Facility: CLINIC | Age: 54
End: 2019-08-27

## 2019-08-27 DIAGNOSIS — E11.29 TYPE 2 DIABETES MELLITUS WITH OTHER DIABETIC KIDNEY COMPLICATION: ICD-10-CM

## 2019-08-27 DIAGNOSIS — R10.9 UNSPECIFIED ABDOMINAL PAIN: ICD-10-CM

## 2019-08-27 DIAGNOSIS — I10 ESSENTIAL (PRIMARY) HYPERTENSION: ICD-10-CM

## 2019-08-27 NOTE — REVIEW OF SYSTEMS
[Cough] : cough [Nocturia] : nocturia [Frequency] : frequency [Back Pain] : back pain [Fever] : no fever [Chills] : no chills [Discharge] : no discharge [Recent Change In Weight] : ~T no recent weight change [Sore Throat] : no sore throat [Chest Pain] : no chest pain [Nasal Discharge] : no nasal discharge [Lower Ext Edema] : no lower extremity edema [Palpitations] : no palpitations [Wheezing] : no wheezing [Abdominal Pain] : no abdominal pain [Dyspnea on Exertion] : no dyspnea on exertion [Constipation] : no constipation [Nausea] : no nausea [Diarrhea] : diarrhea [Vomiting] : no vomiting [Heartburn] : no heartburn [Dysuria] : no dysuria [Hematuria] : no hematuria [Vaginal Discharge] : no vaginal discharge [Skin Rash] : no skin rash [Fainting] : no fainting [Dizziness] : no dizziness [Easy Bleeding] : no easy bleeding [Unsteady Walking] : no ataxia [FreeTextEntry6] : occasionally wakes up coughing

## 2019-08-27 NOTE — HISTORY OF PRESENT ILLNESS
[FreeTextEntry1] : Follow up [de-identified] : 55 y/o F with PMH of HTN, DM type II, diastolic HF (stage 1) presenting here for a follow up appointment. The pt complains of gas pain that occurs starting in the R flank and travels towards the front of the stomach in the epigastric area. The pain occurs when the pt moves, is sharp in quality,is 10/10 in intensity. The pt denies associated hematuria, nausea, vomiting, diarrhea/constipation, heartburn or hx of kidney stones. The pt conveys that this feels like her typical gas pain, which last occurred about a year ago and was similar in character. The pt took 1 pill of Gas-X yesterday but it has not resolved her symptoms. The pt conveys that when she drinks something hot, she burps and it makes the pain better. Pt reports to increased frequency of urination since July, but no dysuria, fevers, chills, increased thirst or other issues. The pt has not been checking her blood pressure at home because she conveys that she does not have a BP machine. She reports compliance with her current medication regimen.

## 2019-08-27 NOTE — ASSESSMENT
[FreeTextEntry1] : 53 y/o F with HTN, DM type II, diastolic HF (stage 1) presenting here for a follow up appointment \par \par \par #R flank pain with radiation to abdomen\par -Likely gas vs musculoskeletal pain, though kidney stone also possible\par -Pt counseled to continue to take Gas-X for symptomatic relief; pt only took one tablet, which is insufficient to assess response\par -Renal US ordered to r/o kidney stone in the setting of R flank 10/10 in intensity, though this is less likely since pt does not appear to be in distress and has no associated hematuria/ hx of kidney stones\par -If symptoms resolve with Gax-X pt counseled that renal US not required\par -Pt counseled to take OTC pain relievers(i.e. Tylenol) for pain control\par \par \par #Urinary frequency, worse at night\par -High PO intake of water vs UTI vs diabetes\par -Pt reports to drinking a lot thorough the day and prior to going to sleep, though she denies excessive thirst\par -Will obtain UA to assess for UTI given symptoms of urinary frequency/flank pain and treat empirically if positive \par \par #DM2\par -On 7/6/19 A1C 6.7, urine microalbumin 70\par -c/w metformin 500 BID\par -Opthalmology appointment scheduled for tomorrow; podiatry appointment scheduled for December\par -Will repeat A1C in 2 months\par \par #HTN\par -BP on last visit at 146/82, on this visit 164/110\par -pt has not been checking BP at home because she doesn't have BP machine\par -currently on amlodipine 10mg daily, previously on losartan 100mg daily which pt stopped due to concern for side effects of chest pain; started on lisinopril 5 daily on last visit which the pt has been able to tolerate; will transition to 10 mg lisinopril daily for improved BP control/renal protection \par -BP machine to be resent to pharmacy as pt conveys that the pharmacy has not received the last order for BP monitor\par \par #HCM\par -mammo w/ BIRADS 3 in 11/13/2018, with scattered areas of fibroglandular density noted, follow up needed\par -Mammo referral given on last visit, to be re-ordered as pt reports to not getting referral previously \par -PAP WNL 2017, Next 2020 \par -lipid panel Oct 2018 WNL\par \par d/w \par RTC in 10 weeks

## 2019-08-27 NOTE — PHYSICAL EXAM
[No Acute Distress] : no acute distress [Well Nourished] : well nourished [Well Developed] : well developed [Well-Appearing] : well-appearing [Normal Sclera/Conjunctiva] : normal sclera/conjunctiva [Normal Outer Ear/Nose] : the outer ears and nose were normal in appearance [No Respiratory Distress] : no respiratory distress  [No JVD] : no jugular venous distention [Clear to Auscultation] : lungs were clear to auscultation bilaterally [Normal Percussion] : the chest was normal to percussion [Normal Rate] : normal rate  [Normal S1, S2] : normal S1 and S2 [Regular Rhythm] : with a regular rhythm [No Edema] : there was no peripheral edema [Soft] : abdomen soft [Non Tender] : non-tender [No HSM] : no HSM [No Joint Swelling] : no joint swelling [No Rash] : no rash [Normal Mood] : the mood was normal [Normal Gait] : normal gait [de-identified] : Mild R CVA discomfort with tapping, none on the left; no suprapubic or epigastric TTP

## 2019-08-30 ENCOUNTER — APPOINTMENT (OUTPATIENT)
Dept: PODIATRY | Facility: HOSPITAL | Age: 54
End: 2019-08-30

## 2019-09-06 LAB
APPEARANCE: CLEAR
BILIRUBIN URINE: NORMAL
BLOOD URINE: NORMAL
COLOR: YELLOW
GLUCOSE QUALITATIVE U: NORMAL
KETONES URINE: NORMAL
LEUKOCYTE ESTERASE URINE: NORMAL
NITRITE URINE: NORMAL
PH URINE: 6.5
PROTEIN URINE: NORMAL
SPECIFIC GRAVITY URINE: 1.02
UROBILINOGEN URINE: 0.2

## 2019-09-16 ENCOUNTER — RX RENEWAL (OUTPATIENT)
Age: 54
End: 2019-09-16

## 2019-09-17 ENCOUNTER — RX RENEWAL (OUTPATIENT)
Age: 54
End: 2019-09-17

## 2019-09-18 ENCOUNTER — RX RENEWAL (OUTPATIENT)
Age: 54
End: 2019-09-18

## 2019-10-16 ENCOUNTER — RX RENEWAL (OUTPATIENT)
Age: 54
End: 2019-10-16

## 2019-11-27 ENCOUNTER — RX RENEWAL (OUTPATIENT)
Age: 54
End: 2019-11-27

## 2019-12-03 ENCOUNTER — APPOINTMENT (OUTPATIENT)
Dept: GASTROENTEROLOGY | Facility: HOSPITAL | Age: 54
End: 2019-12-03
Payer: MEDICAID

## 2019-12-03 ENCOUNTER — OUTPATIENT (OUTPATIENT)
Dept: OUTPATIENT SERVICES | Facility: HOSPITAL | Age: 54
LOS: 1 days | End: 2019-12-03
Payer: MEDICAID

## 2019-12-03 VITALS
DIASTOLIC BLOOD PRESSURE: 90 MMHG | HEIGHT: 63 IN | WEIGHT: 178 LBS | SYSTOLIC BLOOD PRESSURE: 142 MMHG | HEART RATE: 84 BPM | RESPIRATION RATE: 14 BRPM | BODY MASS INDEX: 31.54 KG/M2

## 2019-12-03 DIAGNOSIS — Z80.0 FAMILY HISTORY OF MALIGNANT NEOPLASM OF DIGESTIVE ORGANS: ICD-10-CM

## 2019-12-03 DIAGNOSIS — Z98.89 OTHER SPECIFIED POSTPROCEDURAL STATES: Chronic | ICD-10-CM

## 2019-12-03 DIAGNOSIS — R10.9 UNSPECIFIED ABDOMINAL PAIN: ICD-10-CM

## 2019-12-03 PROCEDURE — 99202 OFFICE O/P NEW SF 15 MIN: CPT | Mod: GC

## 2019-12-03 PROCEDURE — G0463: CPT

## 2019-12-03 NOTE — HISTORY OF PRESENT ILLNESS
[de-identified] : 54 year old female with hx of HTN, DM2, HFpEF presents for initial evaluation of abdominal pain.\par \par Patient reports for years she has had abdominal discomfort located in the epigastrium and left side of the abdomen. The discomfort is relatively constant throughout the day if she doesn't take her pill [she doesn't recall the name of the medication, but she is prescribed famotidine]. She feels better with food intake. She recently cut out meat out of her diet about a year ago and reported some improvement in her symptoms. She has reintroduced chicken and reports this may have worsened her symptoms. She denies fevers, chills, CP, SOB, N/V/D, melena, hematochezia, hematemesis and headache. She reports bowel movements roughly 3 days, but states this is her baseline. \par \par She had a colonoscopy a few years ago, which she reports was normal. She states her symptoms began around that time. \par She has never had an EGD\par \par Father had colon cancer in 70-80s

## 2019-12-03 NOTE — ASSESSMENT
[FreeTextEntry1] : Impression:\par # Abdominal Discomfort: Given improvement with meals and H2 blocker, PUD is on the differential. Should also include NSAID-induced gastritis, H. Pylori, GERD, functional disorders and less likely celiac\par # Colon Cancer Screening: Increased risk due to familial hx\par \par Recommendation\par - Continue H2 blocker\par - Schedule for colonoscopy for CRC screening\par - Schedule for EGD to evaluate abdominal pain\par - Risks and benefits of endoscopic procedures, including: bleeding, perforation and reaction to sedation were discussed with patient who understands and is agreeable to proceed.\par - Check abdominal sono to rule out gallstones\par - Avoid NSAIDs\par - Weight loss counseling provided\par \par D/w Dr. ALVAREZ

## 2019-12-03 NOTE — REVIEW OF SYSTEMS
[Abdominal Pain] : abdominal pain [Chills] : no chills [Fever] : no fever [Eye Pain] : no eye pain [Nosebleeds] : no nosebleeds [Red Eyes] : eyes not red [Nasal Discharge] : no nasal discharge [Palpitations] : no palpitations [Chest Pain] : no chest pain [Wheezing] : no wheezing [Shortness Of Breath] : no shortness of breath [Vomiting] : no vomiting [Cough] : no cough [Constipation] : no constipation [Diarrhea] : no diarrhea [Arthralgias] : no arthralgias [Joint Pain] : no joint pain [Skin Wound] : no skin wound [Skin Lesions] : no skin lesions [Anxiety] : no anxiety [Fainting] : no fainting [Dizziness] : no dizziness [Depression] : no depression

## 2019-12-03 NOTE — PHYSICAL EXAM
[General Appearance - In No Acute Distress] : in no acute distress [General Appearance - Alert] : alert [General Appearance - Well Nourished] : well nourished [Sclera] : the sclera and conjunctiva were normal [Extraocular Movements] : extraocular movements were intact [Outer Ear] : the ears and nose were normal in appearance [Examination Of The Oral Cavity] : the lips and gums were normal [Oropharynx] : the oropharynx was normal [Neck Appearance] : the appearance of the neck was normal [Respiration, Rhythm And Depth] : normal respiratory rhythm and effort [Heart Sounds] : normal S1 and S2 [Auscultation Breath Sounds / Voice Sounds] : lungs were clear to auscultation bilaterally [Heart Rate And Rhythm] : heart rate was normal and rhythm regular [Abdomen Soft] : soft [Bowel Sounds] : normal bowel sounds [Abnormal Walk] : normal gait [No Spinal Tenderness] : no spinal tenderness [Abdomen Tenderness] : non-tender [Skin Color & Pigmentation] : normal skin color and pigmentation [Musculoskeletal - Swelling] : no joint swelling seen [Skin Lesions] : no skin lesions [] : no rash [No Focal Deficits] : no focal deficits [Affect] : the affect was normal [Oriented To Time, Place, And Person] : oriented to person, place, and time [Mood] : the mood was normal

## 2019-12-04 DIAGNOSIS — Z80.0 FAMILY HISTORY OF MALIGNANT NEOPLASM OF DIGESTIVE ORGANS: ICD-10-CM

## 2019-12-09 ENCOUNTER — APPOINTMENT (OUTPATIENT)
Dept: INTERNAL MEDICINE | Facility: CLINIC | Age: 54
End: 2019-12-09
Payer: MEDICAID

## 2019-12-09 ENCOUNTER — LABORATORY RESULT (OUTPATIENT)
Age: 54
End: 2019-12-09

## 2019-12-09 ENCOUNTER — OUTPATIENT (OUTPATIENT)
Dept: OUTPATIENT SERVICES | Facility: HOSPITAL | Age: 54
LOS: 1 days | End: 2019-12-09

## 2019-12-09 VITALS
HEIGHT: 63 IN | DIASTOLIC BLOOD PRESSURE: 80 MMHG | WEIGHT: 177 LBS | BODY MASS INDEX: 31.36 KG/M2 | HEART RATE: 78 BPM | SYSTOLIC BLOOD PRESSURE: 140 MMHG

## 2019-12-09 DIAGNOSIS — Z87.898 PERSONAL HISTORY OF OTHER SPECIFIED CONDITIONS: ICD-10-CM

## 2019-12-09 DIAGNOSIS — Z98.89 OTHER SPECIFIED POSTPROCEDURAL STATES: Chronic | ICD-10-CM

## 2019-12-09 LAB
ALBUMIN SERPL ELPH-MCNC: 4.5 G/DL — SIGNIFICANT CHANGE UP (ref 3.3–5)
ALP SERPL-CCNC: 82 U/L — SIGNIFICANT CHANGE UP (ref 40–120)
ALT FLD-CCNC: 32 U/L — SIGNIFICANT CHANGE UP (ref 4–33)
ANION GAP SERPL CALC-SCNC: 13 MMO/L — SIGNIFICANT CHANGE UP (ref 7–14)
AST SERPL-CCNC: 29 U/L — SIGNIFICANT CHANGE UP (ref 4–32)
BILIRUB SERPL-MCNC: 0.6 MG/DL — SIGNIFICANT CHANGE UP (ref 0.2–1.2)
BUN SERPL-MCNC: 10 MG/DL — SIGNIFICANT CHANGE UP (ref 7–23)
CALCIUM SERPL-MCNC: 10.3 MG/DL — SIGNIFICANT CHANGE UP (ref 8.4–10.5)
CHLORIDE SERPL-SCNC: 100 MMOL/L — SIGNIFICANT CHANGE UP (ref 98–107)
CHOLEST SERPL-MCNC: 182 MG/DL — SIGNIFICANT CHANGE UP (ref 120–199)
CO2 SERPL-SCNC: 29 MMOL/L — SIGNIFICANT CHANGE UP (ref 22–31)
CREAT SERPL-MCNC: 0.67 MG/DL — SIGNIFICANT CHANGE UP (ref 0.5–1.3)
GLUCOSE SERPL-MCNC: 112 MG/DL — HIGH (ref 70–99)
HBA1C BLD-MCNC: 6.5 % — HIGH (ref 4–5.6)
HDLC SERPL-MCNC: 60 MG/DL — SIGNIFICANT CHANGE UP (ref 45–65)
LIPID PNL WITH DIRECT LDL SERPL: 112 MG/DL — SIGNIFICANT CHANGE UP
POTASSIUM SERPL-MCNC: 3.7 MMOL/L — SIGNIFICANT CHANGE UP (ref 3.5–5.3)
POTASSIUM SERPL-SCNC: 3.7 MMOL/L — SIGNIFICANT CHANGE UP (ref 3.5–5.3)
PROT SERPL-MCNC: 8.6 G/DL — HIGH (ref 6–8.3)
SODIUM SERPL-SCNC: 142 MMOL/L — SIGNIFICANT CHANGE UP (ref 135–145)
TRIGL SERPL-MCNC: 88 MG/DL — SIGNIFICANT CHANGE UP (ref 10–149)

## 2019-12-09 PROCEDURE — 99214 OFFICE O/P EST MOD 30 MIN: CPT | Mod: GC

## 2019-12-09 RX ORDER — LISINOPRIL 5 MG/1
5 TABLET ORAL DAILY
Qty: 30 | Refills: 1 | Status: DISCONTINUED | COMMUNITY
Start: 2019-07-23 | End: 2019-12-09

## 2019-12-09 RX ORDER — BLOOD PRESSURE TEST KIT-MEDIUM
KIT MISCELLANEOUS
Qty: 1 | Refills: 0 | Status: ACTIVE | COMMUNITY
Start: 2019-12-09 | End: 1900-01-01

## 2019-12-09 NOTE — PHYSICAL EXAM
[No Acute Distress] : no acute distress [Well Nourished] : well nourished [Normal Sclera/Conjunctiva] : normal sclera/conjunctiva [Normal Outer Ear/Nose] : the outer ears and nose were normal in appearance [No JVD] : no jugular venous distention [Supple] : supple [No Respiratory Distress] : no respiratory distress  [Clear to Auscultation] : lungs were clear to auscultation bilaterally [Normal Rate] : normal rate  [Regular Rhythm] : with a regular rhythm [No Murmur] : no murmur heard [No Edema] : there was no peripheral edema [Soft] : abdomen soft [Non Tender] : non-tender [Non-distended] : non-distended [Normal Bowel Sounds] : normal bowel sounds [No Rash] : no rash [Normal Affect] : the affect was normal [Normal Gait] : normal gait

## 2019-12-10 LAB
24R-OH-CALCIDIOL SERPL-MCNC: 14.3 NG/ML — LOW (ref 30–80)
GLUCOSE BLDC GLUCOMTR-MCNC: 127

## 2019-12-11 ENCOUNTER — MED ADMIN CHARGE (OUTPATIENT)
Age: 54
End: 2019-12-11

## 2019-12-14 ENCOUNTER — OUTPATIENT (OUTPATIENT)
Dept: OUTPATIENT SERVICES | Facility: HOSPITAL | Age: 54
LOS: 1 days | End: 2019-12-14
Payer: MEDICAID

## 2019-12-14 ENCOUNTER — APPOINTMENT (OUTPATIENT)
Dept: ULTRASOUND IMAGING | Facility: IMAGING CENTER | Age: 54
End: 2019-12-14
Payer: MEDICAID

## 2019-12-14 DIAGNOSIS — Z98.89 OTHER SPECIFIED POSTPROCEDURAL STATES: Chronic | ICD-10-CM

## 2019-12-14 DIAGNOSIS — R10.9 UNSPECIFIED ABDOMINAL PAIN: ICD-10-CM

## 2019-12-14 PROCEDURE — 76700 US EXAM ABDOM COMPLETE: CPT

## 2019-12-14 PROCEDURE — 76700 US EXAM ABDOM COMPLETE: CPT | Mod: 26

## 2019-12-15 NOTE — HISTORY OF PRESENT ILLNESS
[FreeTextEntry1] : abdominal pain/CP [de-identified] : 53 y/o F with HTN, DM type II, diastolic HF (stage 1) presenting here with a complaint of abdominal pain, CP and ear ringing with dizziness. The pt reports that she felt her L ear hurt 2 weeks ago; she felt a pulsating sensation accompanied by ringing w/o hearing loss but with dizziness. The pt went to urgent care and was told that she had vertigo. The pt was started on meclizine, which help to alleviate her symptoms. These symptoms occur about twice a week, and are worse at night. The pt likewise complains of sternal CP. The CP is intermittent, is not associated with activity, occurs sometimes after taking lisinopril and occurs 2-3 times a week. The pain is non radiating,is not associated diaphoreses, is 'achy'/sharp in quality, worse with breathing and occurs 3-4 hours at a time. The pt also complains of increased urinary frequency at night; she denies hesitancy, dysuria, hematuria or urinary incontinence .

## 2019-12-15 NOTE — REVIEW OF SYSTEMS
[Itching] : itching [Earache] : earache [Nasal Discharge] : nasal discharge [Chest Pain] : chest pain [Nausea] : nausea [Abdominal Pain] : abdominal pain [Nocturia] : nocturia [Frequency] : frequency [Joint Pain] : joint pain [Headache] : headache [Fever] : no fever [Chills] : no chills [Redness] : no redness [Discharge] : no discharge [Hearing Loss] : no hearing loss [Nosebleed] : no nosebleeds [Sore Throat] : no sore throat [Hoarseness] : no hoarseness [Postnasal Drip] : no postnasal drip [Palpitations] : no palpitations [Leg Claudication] : no leg claudication [Lower Ext Edema] : no lower extremity edema [Shortness Of Breath] : no shortness of breath [Cough] : no cough [Wheezing] : no wheezing [Constipation] : no constipation [Vomiting] : no vomiting [Diarrhea] : diarrhea [Heartburn] : no heartburn [Dysuria] : no dysuria [Melena] : no melena [Hematuria] : no hematuria [Incontinence] : no incontinence [Skin Rash] : no skin rash [Vaginal Discharge] : no vaginal discharge [Suicidal] : not suicidal [Easy Bleeding] : no easy bleeding [Depression] : no depression [FreeTextEntry5] : intermittent CP not associated with activity, occurs sometimes after taking lisinopril, occurs 2-3 times a week, non radiating, no associated diaphoreses, 'achy'/sharp in quality, worse with breathing, 3-4 hours at a time [de-identified] : mild R sided headache, feel tension in the area  [FreeTextEntry9] : R shoulder, back pain

## 2019-12-15 NOTE — ASSESSMENT
[FreeTextEntry1] : 53 y/o F with HTN, DM type II, diastolic HF (stage 1) presenting here for a follow up appointment \par \par \par \par #Abdominal discomfort\par -Pt w/ abdominal discomfort worse with empty stomach and improved with food/famotidine\par -weight loss of 190 to 170s over 1 month Dec 2019-Jan 2019; pt reports to eating less fatty foods/red meat during that time, but no recent weight loss; pt denies loss of appetite \par -Differential includes PUD vs gastritis, pt continues to take NSAIDs still for pain as needed for shoulder/back pain\par -Pt seen by GI on 12/3/19\par -colonoscopy/EGD scheduled for January\par \par #Chest pain\par -Unlikely angina since not worsened with activity/lipid profile WNL, possible musculoskeletal vs medication induced vs psychosomatic\par -Cardiology referral for stress test\par \par #Tinnitus w/ dizziness\par -Meniere's vs migrainous vertigo given that it's associated with headache vs idiopathic, unlikely BPPV since not positional, unlikely labyrinthitis/vestibular neuritis since no preceding URI\par -c/w meclizine \par -Pt reports to seeing ENT years ago with no findings as a source at the time; will give ENT referral\par \par #Urinary frequency, worse at night\par -possibly 2/2 to diabetes\par -pt denies hesitancy, dysuria, hematuria or urinary incontinence  \par -pt drinks one large bottle of water per day and denies drinking a lot prior to going to sleep\par -UA on last visit negative for infection\par \par #DM2\par -On 7/6/19 A1C 6.7, urine microalbumin 70, will obtain repeat A1C  6.5 on this visit\par -c/w metformin 500 BID\par -Pt reports seeing Opthalmologist in August, no acute findings noted at the time; pt had difficulty scheduling podiatry appointment due to her work\par \par #HTN\par -BP in the 140s/90s usually at work; in office 140/80 and 144/90 on repeat \par -pt has not been checking BP at home because she doesn't have BP machine\par -currently on amlodipine 10mg daily and lisinopril 10mg, pt reports compliance with regimen; will increase lisinopril to 20mg daily; pt counseled that if she has worsening CP and is unable to tolerate medicate, to call the clinic for medication change\par -BP machine to be resent to pharmacy as pt conveys that the pharmacy has not received the last order for BP monitor\par \par #HCM\par -mammo w/ BIRADS 3 in 11/13/2018, with scattered areas of fibroglandular density noted\par -Mammo referral given on last visit, to be re-ordered as pt reports to not getting referral previously; mammo referral re-ordered and pt counseled to get a print out of the referral in the \par -PAP WNL 2017, Next 2020 \par -lipid panel Oct 2018 WNL; will obtain repeat\par -obtain vit D as per pt request \par \par case seen and discussed with Dr. Daigle

## 2019-12-15 NOTE — END OF VISIT
[] : Resident [FreeTextEntry3] : PEDRO STRATTON is a 54 year old female with HFpEF, HTN, DM2 c/o  abd and chest pain \par #abd pain: gastitis vs PUD on h2 blocker, seen by GI, planning EGD\par #chest pain: atypical. \par #tinnitus: occasional. ref to ENT\par #urinary frequency : 2/ DM. recheck labs. \par #HTN: uncontrolled.

## 2019-12-16 DIAGNOSIS — R35.0 FREQUENCY OF MICTURITION: ICD-10-CM

## 2019-12-16 DIAGNOSIS — E11.29 TYPE 2 DIABETES MELLITUS WITH OTHER DIABETIC KIDNEY COMPLICATION: ICD-10-CM

## 2019-12-16 DIAGNOSIS — I10 ESSENTIAL (PRIMARY) HYPERTENSION: ICD-10-CM

## 2019-12-16 DIAGNOSIS — I50.30 UNSPECIFIED DIASTOLIC (CONGESTIVE) HEART FAILURE: ICD-10-CM

## 2019-12-16 DIAGNOSIS — R10.9 UNSPECIFIED ABDOMINAL PAIN: ICD-10-CM

## 2019-12-16 DIAGNOSIS — R07.9 CHEST PAIN, UNSPECIFIED: ICD-10-CM

## 2019-12-16 DIAGNOSIS — Z00.00 ENCOUNTER FOR GENERAL ADULT MEDICAL EXAMINATION WITHOUT ABNORMAL FINDINGS: ICD-10-CM

## 2020-01-09 ENCOUNTER — RESULT REVIEW (OUTPATIENT)
Age: 55
End: 2020-01-09

## 2020-01-09 ENCOUNTER — OUTPATIENT (OUTPATIENT)
Dept: OUTPATIENT SERVICES | Facility: HOSPITAL | Age: 55
LOS: 1 days | End: 2020-01-09
Payer: MEDICAID

## 2020-01-09 DIAGNOSIS — Z98.89 OTHER SPECIFIED POSTPROCEDURAL STATES: Chronic | ICD-10-CM

## 2020-01-09 DIAGNOSIS — R10.9 UNSPECIFIED ABDOMINAL PAIN: ICD-10-CM

## 2020-01-09 PROCEDURE — 45378 DIAGNOSTIC COLONOSCOPY: CPT

## 2020-01-09 PROCEDURE — 88305 TISSUE EXAM BY PATHOLOGIST: CPT | Mod: 26

## 2020-01-09 PROCEDURE — 88305 TISSUE EXAM BY PATHOLOGIST: CPT

## 2020-01-09 PROCEDURE — 88312 SPECIAL STAINS GROUP 1: CPT

## 2020-01-09 PROCEDURE — 88312 SPECIAL STAINS GROUP 1: CPT | Mod: 26

## 2020-01-09 PROCEDURE — 43239 EGD BIOPSY SINGLE/MULTIPLE: CPT

## 2020-01-09 PROCEDURE — G0105: CPT

## 2020-01-16 ENCOUNTER — MESSAGE (OUTPATIENT)
Age: 55
End: 2020-01-16

## 2020-01-21 ENCOUNTER — OTHER (OUTPATIENT)
Age: 55
End: 2020-01-21

## 2020-02-10 ENCOUNTER — RX RENEWAL (OUTPATIENT)
Age: 55
End: 2020-02-10

## 2020-03-23 ENCOUNTER — APPOINTMENT (OUTPATIENT)
Dept: INTERNAL MEDICINE | Facility: CLINIC | Age: 55
End: 2020-03-23

## 2020-04-03 ENCOUNTER — RX RENEWAL (OUTPATIENT)
Age: 55
End: 2020-04-03

## 2020-04-10 ENCOUNTER — APPOINTMENT (OUTPATIENT)
Dept: INTERNAL MEDICINE | Facility: CLINIC | Age: 55
End: 2020-04-10
Payer: MEDICAID

## 2020-04-10 ENCOUNTER — OUTPATIENT (OUTPATIENT)
Dept: OUTPATIENT SERVICES | Facility: HOSPITAL | Age: 55
LOS: 1 days | End: 2020-04-10

## 2020-04-10 DIAGNOSIS — Z98.89 OTHER SPECIFIED POSTPROCEDURAL STATES: Chronic | ICD-10-CM

## 2020-04-10 PROCEDURE — ZZZZZ: CPT

## 2020-04-10 RX ORDER — MECLIZINE HYDROCHLORIDE 12.5 MG/1
12.5 TABLET ORAL 3 TIMES DAILY
Refills: 0 | Status: DISCONTINUED | COMMUNITY
Start: 2019-12-09 | End: 2020-04-10

## 2020-04-10 RX ORDER — POLYETHYLENE GLYCOL 3350 AND ELECTROLYTES WITH LEMON FLAVOR 236; 22.74; 6.74; 5.86; 2.97 G/4L; G/4L; G/4L; G/4L; G/4L
236 POWDER, FOR SOLUTION ORAL
Qty: 1 | Refills: 0 | Status: DISCONTINUED | COMMUNITY
Start: 2019-12-03 | End: 2020-04-10

## 2020-04-16 DIAGNOSIS — K21.9 GASTRO-ESOPHAGEAL REFLUX DISEASE WITHOUT ESOPHAGITIS: ICD-10-CM

## 2020-04-16 DIAGNOSIS — E11.29 TYPE 2 DIABETES MELLITUS WITH OTHER DIABETIC KIDNEY COMPLICATION: ICD-10-CM

## 2020-04-16 DIAGNOSIS — I50.30 UNSPECIFIED DIASTOLIC (CONGESTIVE) HEART FAILURE: ICD-10-CM

## 2020-04-16 DIAGNOSIS — I10 ESSENTIAL (PRIMARY) HYPERTENSION: ICD-10-CM

## 2020-05-19 ENCOUNTER — RX RENEWAL (OUTPATIENT)
Age: 55
End: 2020-05-19

## 2020-06-03 ENCOUNTER — RX RENEWAL (OUTPATIENT)
Age: 55
End: 2020-06-03

## 2020-06-08 ENCOUNTER — RX RENEWAL (OUTPATIENT)
Age: 55
End: 2020-06-08

## 2020-09-02 ENCOUNTER — RX RENEWAL (OUTPATIENT)
Age: 55
End: 2020-09-02

## 2020-09-14 ENCOUNTER — RX RENEWAL (OUTPATIENT)
Age: 55
End: 2020-09-14

## 2020-09-28 ENCOUNTER — APPOINTMENT (OUTPATIENT)
Dept: ULTRASOUND IMAGING | Facility: IMAGING CENTER | Age: 55
End: 2020-09-28
Payer: MEDICAID

## 2020-09-28 ENCOUNTER — RESULT REVIEW (OUTPATIENT)
Age: 55
End: 2020-09-28

## 2020-09-28 ENCOUNTER — OUTPATIENT (OUTPATIENT)
Dept: OUTPATIENT SERVICES | Facility: HOSPITAL | Age: 55
LOS: 1 days | End: 2020-09-28
Payer: MEDICAID

## 2020-09-28 ENCOUNTER — APPOINTMENT (OUTPATIENT)
Dept: MAMMOGRAPHY | Facility: IMAGING CENTER | Age: 55
End: 2020-09-28
Payer: MEDICAID

## 2020-09-28 DIAGNOSIS — Z98.89 OTHER SPECIFIED POSTPROCEDURAL STATES: Chronic | ICD-10-CM

## 2020-09-28 DIAGNOSIS — Z12.31 ENCOUNTER FOR SCREENING MAMMOGRAM FOR MALIGNANT NEOPLASM OF BREAST: ICD-10-CM

## 2020-09-28 PROCEDURE — G0279: CPT | Mod: 26

## 2020-09-28 PROCEDURE — 77066 DX MAMMO INCL CAD BI: CPT | Mod: 26

## 2020-09-28 PROCEDURE — 77066 DX MAMMO INCL CAD BI: CPT

## 2020-09-28 PROCEDURE — G0279: CPT

## 2020-10-22 ENCOUNTER — NON-APPOINTMENT (OUTPATIENT)
Age: 55
End: 2020-10-22

## 2020-10-26 ENCOUNTER — NON-APPOINTMENT (OUTPATIENT)
Age: 55
End: 2020-10-26

## 2020-11-20 ENCOUNTER — RX RENEWAL (OUTPATIENT)
Age: 55
End: 2020-11-20

## 2020-11-22 ENCOUNTER — EMERGENCY (EMERGENCY)
Facility: HOSPITAL | Age: 55
LOS: 1 days | Discharge: HOME | End: 2020-11-22
Attending: EMERGENCY MEDICINE
Payer: MEDICAID

## 2020-11-22 VITALS
HEART RATE: 72 BPM | RESPIRATION RATE: 16 BRPM | SYSTOLIC BLOOD PRESSURE: 176 MMHG | OXYGEN SATURATION: 99 % | DIASTOLIC BLOOD PRESSURE: 112 MMHG | WEIGHT: 164.91 LBS | TEMPERATURE: 98 F | HEIGHT: 63 IN

## 2020-11-22 VITALS
TEMPERATURE: 98 F | RESPIRATION RATE: 16 BRPM | SYSTOLIC BLOOD PRESSURE: 163 MMHG | HEART RATE: 77 BPM | DIASTOLIC BLOOD PRESSURE: 59 MMHG | OXYGEN SATURATION: 99 %

## 2020-11-22 DIAGNOSIS — Z98.89 OTHER SPECIFIED POSTPROCEDURAL STATES: Chronic | ICD-10-CM

## 2020-11-22 LAB
ALBUMIN SERPL ELPH-MCNC: 4.2 G/DL — SIGNIFICANT CHANGE UP (ref 3.3–5)
ALP SERPL-CCNC: 61 U/L — SIGNIFICANT CHANGE UP (ref 40–120)
ALT FLD-CCNC: 31 U/L — SIGNIFICANT CHANGE UP (ref 10–45)
ANION GAP SERPL CALC-SCNC: 14 MMOL/L — SIGNIFICANT CHANGE UP (ref 5–17)
AST SERPL-CCNC: 31 U/L — SIGNIFICANT CHANGE UP (ref 10–40)
BASOPHILS # BLD AUTO: 0.03 K/UL — SIGNIFICANT CHANGE UP (ref 0–0.2)
BASOPHILS NFR BLD AUTO: 0.3 % — SIGNIFICANT CHANGE UP (ref 0–2)
BILIRUB SERPL-MCNC: 0.4 MG/DL — SIGNIFICANT CHANGE UP (ref 0.2–1.2)
BUN SERPL-MCNC: 16 MG/DL — SIGNIFICANT CHANGE UP (ref 7–23)
CALCIUM SERPL-MCNC: 10 MG/DL — SIGNIFICANT CHANGE UP (ref 8.4–10.5)
CHLORIDE SERPL-SCNC: 103 MMOL/L — SIGNIFICANT CHANGE UP (ref 96–108)
CO2 SERPL-SCNC: 24 MMOL/L — SIGNIFICANT CHANGE UP (ref 22–31)
CREAT SERPL-MCNC: 0.73 MG/DL — SIGNIFICANT CHANGE UP (ref 0.5–1.3)
EOSINOPHIL # BLD AUTO: 0.12 K/UL — SIGNIFICANT CHANGE UP (ref 0–0.5)
EOSINOPHIL NFR BLD AUTO: 1.3 % — SIGNIFICANT CHANGE UP (ref 0–6)
GLUCOSE SERPL-MCNC: 112 MG/DL — HIGH (ref 70–99)
HCT VFR BLD CALC: 35.7 % — SIGNIFICANT CHANGE UP (ref 34.5–45)
HGB BLD-MCNC: 12.1 G/DL — SIGNIFICANT CHANGE UP (ref 11.5–15.5)
IMM GRANULOCYTES NFR BLD AUTO: 0.2 % — SIGNIFICANT CHANGE UP (ref 0–1.5)
LYMPHOCYTES # BLD AUTO: 3.65 K/UL — HIGH (ref 1–3.3)
LYMPHOCYTES # BLD AUTO: 39.6 % — SIGNIFICANT CHANGE UP (ref 13–44)
MCHC RBC-ENTMCNC: 29.7 PG — SIGNIFICANT CHANGE UP (ref 27–34)
MCHC RBC-ENTMCNC: 33.9 GM/DL — SIGNIFICANT CHANGE UP (ref 32–36)
MCV RBC AUTO: 87.5 FL — SIGNIFICANT CHANGE UP (ref 80–100)
MONOCYTES # BLD AUTO: 0.44 K/UL — SIGNIFICANT CHANGE UP (ref 0–0.9)
MONOCYTES NFR BLD AUTO: 4.8 % — SIGNIFICANT CHANGE UP (ref 2–14)
NEUTROPHILS # BLD AUTO: 4.95 K/UL — SIGNIFICANT CHANGE UP (ref 1.8–7.4)
NEUTROPHILS NFR BLD AUTO: 53.8 % — SIGNIFICANT CHANGE UP (ref 43–77)
NRBC # BLD: 0 /100 WBCS — SIGNIFICANT CHANGE UP (ref 0–0)
NT-PROBNP SERPL-SCNC: 42 PG/ML — SIGNIFICANT CHANGE UP (ref 0–300)
PLATELET # BLD AUTO: 293 K/UL — SIGNIFICANT CHANGE UP (ref 150–400)
POTASSIUM SERPL-MCNC: 3.5 MMOL/L — SIGNIFICANT CHANGE UP (ref 3.5–5.3)
POTASSIUM SERPL-SCNC: 3.5 MMOL/L — SIGNIFICANT CHANGE UP (ref 3.5–5.3)
PROT SERPL-MCNC: 7.4 G/DL — SIGNIFICANT CHANGE UP (ref 6–8.3)
RBC # BLD: 4.08 M/UL — SIGNIFICANT CHANGE UP (ref 3.8–5.2)
RBC # FLD: 11.9 % — SIGNIFICANT CHANGE UP (ref 10.3–14.5)
SARS-COV-2 RNA SPEC QL NAA+PROBE: SIGNIFICANT CHANGE UP
SODIUM SERPL-SCNC: 141 MMOL/L — SIGNIFICANT CHANGE UP (ref 135–145)
TROPONIN T, HIGH SENSITIVITY RESULT: 7 NG/L — SIGNIFICANT CHANGE UP (ref 0–51)
TROPONIN T, HIGH SENSITIVITY RESULT: 8 NG/L — SIGNIFICANT CHANGE UP (ref 0–51)
WBC # BLD: 9.21 K/UL — SIGNIFICANT CHANGE UP (ref 3.8–10.5)
WBC # FLD AUTO: 9.21 K/UL — SIGNIFICANT CHANGE UP (ref 3.8–10.5)

## 2020-11-22 PROCEDURE — G0378: CPT

## 2020-11-22 PROCEDURE — 71046 X-RAY EXAM CHEST 2 VIEWS: CPT | Mod: 26

## 2020-11-22 PROCEDURE — A9500: CPT

## 2020-11-22 PROCEDURE — 71046 X-RAY EXAM CHEST 2 VIEWS: CPT

## 2020-11-22 PROCEDURE — 78452 HT MUSCLE IMAGE SPECT MULT: CPT | Mod: 26

## 2020-11-22 PROCEDURE — 93005 ELECTROCARDIOGRAM TRACING: CPT

## 2020-11-22 PROCEDURE — 99236 HOSP IP/OBS SAME DATE HI 85: CPT

## 2020-11-22 PROCEDURE — 96374 THER/PROPH/DIAG INJ IV PUSH: CPT | Mod: XU

## 2020-11-22 PROCEDURE — 99284 EMERGENCY DEPT VISIT MOD MDM: CPT | Mod: 25

## 2020-11-22 PROCEDURE — 83880 ASSAY OF NATRIURETIC PEPTIDE: CPT

## 2020-11-22 PROCEDURE — 85025 COMPLETE CBC W/AUTO DIFF WBC: CPT

## 2020-11-22 PROCEDURE — 83036 HEMOGLOBIN GLYCOSYLATED A1C: CPT

## 2020-11-22 PROCEDURE — U0003: CPT

## 2020-11-22 PROCEDURE — 82962 GLUCOSE BLOOD TEST: CPT

## 2020-11-22 PROCEDURE — 84484 ASSAY OF TROPONIN QUANT: CPT

## 2020-11-22 PROCEDURE — 80061 LIPID PANEL: CPT

## 2020-11-22 PROCEDURE — 93017 CV STRESS TEST TRACING ONLY: CPT

## 2020-11-22 PROCEDURE — 93016 CV STRESS TEST SUPVJ ONLY: CPT

## 2020-11-22 PROCEDURE — 93018 CV STRESS TEST I&R ONLY: CPT

## 2020-11-22 PROCEDURE — 78452 HT MUSCLE IMAGE SPECT MULT: CPT

## 2020-11-22 PROCEDURE — 80053 COMPREHEN METABOLIC PANEL: CPT

## 2020-11-22 PROCEDURE — 93010 ELECTROCARDIOGRAM REPORT: CPT

## 2020-11-22 RX ORDER — ASPIRIN/CALCIUM CARB/MAGNESIUM 324 MG
81 TABLET ORAL DAILY
Refills: 0 | Status: DISCONTINUED | OUTPATIENT
Start: 2020-11-22 | End: 2020-11-25

## 2020-11-22 RX ORDER — SODIUM CHLORIDE 9 MG/ML
1000 INJECTION, SOLUTION INTRAVENOUS
Refills: 0 | Status: DISCONTINUED | OUTPATIENT
Start: 2020-11-22 | End: 2020-11-25

## 2020-11-22 RX ORDER — DEXTROSE 50 % IN WATER 50 %
15 SYRINGE (ML) INTRAVENOUS ONCE
Refills: 0 | Status: DISCONTINUED | OUTPATIENT
Start: 2020-11-22 | End: 2020-11-25

## 2020-11-22 RX ORDER — DEXTROSE 50 % IN WATER 50 %
25 SYRINGE (ML) INTRAVENOUS ONCE
Refills: 0 | Status: DISCONTINUED | OUTPATIENT
Start: 2020-11-22 | End: 2020-11-25

## 2020-11-22 RX ORDER — GLUCAGON INJECTION, SOLUTION 0.5 MG/.1ML
1 INJECTION, SOLUTION SUBCUTANEOUS ONCE
Refills: 0 | Status: DISCONTINUED | OUTPATIENT
Start: 2020-11-22 | End: 2020-11-25

## 2020-11-22 RX ORDER — ASPIRIN/CALCIUM CARB/MAGNESIUM 324 MG
81 TABLET ORAL ONCE
Refills: 0 | Status: COMPLETED | OUTPATIENT
Start: 2020-11-22 | End: 2020-11-22

## 2020-11-22 RX ORDER — INSULIN LISPRO 100/ML
VIAL (ML) SUBCUTANEOUS
Refills: 0 | Status: DISCONTINUED | OUTPATIENT
Start: 2020-11-22 | End: 2020-11-25

## 2020-11-22 RX ORDER — AMLODIPINE BESYLATE 2.5 MG/1
10 TABLET ORAL DAILY
Refills: 0 | Status: DISCONTINUED | OUTPATIENT
Start: 2020-11-22 | End: 2020-11-25

## 2020-11-22 RX ORDER — DEXTROSE 50 % IN WATER 50 %
12.5 SYRINGE (ML) INTRAVENOUS ONCE
Refills: 0 | Status: DISCONTINUED | OUTPATIENT
Start: 2020-11-22 | End: 2020-11-25

## 2020-11-22 RX ORDER — LISINOPRIL 2.5 MG/1
10 TABLET ORAL DAILY
Refills: 0 | Status: DISCONTINUED | OUTPATIENT
Start: 2020-11-22 | End: 2020-11-25

## 2020-11-22 RX ORDER — FAMOTIDINE 10 MG/ML
20 INJECTION INTRAVENOUS
Refills: 0 | Status: DISCONTINUED | OUTPATIENT
Start: 2020-11-22 | End: 2020-11-25

## 2020-11-22 RX ORDER — PANTOPRAZOLE SODIUM 20 MG/1
40 TABLET, DELAYED RELEASE ORAL ONCE
Refills: 0 | Status: COMPLETED | OUTPATIENT
Start: 2020-11-22 | End: 2020-11-22

## 2020-11-22 RX ADMIN — PANTOPRAZOLE SODIUM 40 MILLIGRAM(S): 20 TABLET, DELAYED RELEASE ORAL at 04:58

## 2020-11-22 RX ADMIN — LISINOPRIL 10 MILLIGRAM(S): 2.5 TABLET ORAL at 04:59

## 2020-11-22 RX ADMIN — FAMOTIDINE 20 MILLIGRAM(S): 10 INJECTION INTRAVENOUS at 04:58

## 2020-11-22 RX ADMIN — Medication 81 MILLIGRAM(S): at 01:42

## 2020-11-22 RX ADMIN — Medication 30 MILLILITER(S): at 04:58

## 2020-11-22 NOTE — ED PROVIDER NOTE - PHYSICAL EXAMINATION
CONSTITUTIONAL: Nontoxic, well nourished, well developed, middle-aged female, resting comfortably in no acute distress  HEAD: Normocephalic; atraumatic  EYES: Normal inspection, EOMI  ENMT: External appears normal; normal oropharynx  NECK: Supple; non-tender; no cervical lymphadenopathy  CARD: RRR; no audible murmurs, rubs, or gallops  RESP: No respiratory distress, lungs ctab/l  ABD: Soft, non-distended; non-tender; no rebound or guarding  EXT: No LE pitting edema or calf tenderness; distal pulses intact with good capillary refill  SKIN: Warm, dry, intact  NEURO: aaox3, moving all extremities spontaneously CONSTITUTIONAL: Nontoxic, well nourished, well developed, middle-aged female, resting comfortably in no acute distress  HEAD: Normocephalic; atraumatic  EYES: Normal inspection, EOMI  ENMT: External appears normal; normal oropharynx  NECK: Supple; non-tender; no cervical lymphadenopathy  CARD: RRR; no audible murmurs, rubs, or gallops  RESP: No respiratory distress, lungs ctab/l  ABD: Soft, non-distended; non-tender; no rebound or guarding  EXT: No LE pitting edema or calf tenderness; distal pulses intact with good capillary refill  SKIN: Warm, dry, intact  NEURO: aaox3, moving all extremities spontaneously  Attending Cammy Pal: Gen: NAD, heent: atrauamtic, eomi, perrla, mmm, op pink, uvula midline, neck; nttp, no nuchal rigidity, chest: nttp, no crepitus, cv: rrr, no murmurs, lungs: ctab, abd: soft, nontender, nondistended, no peritoneal signs, +BS, no guarding, ext: wwp, neg homans, skin: no rash, neuro: awake and alert, following commands, speech clear, sensation and strength intact, no focal deficits

## 2020-11-22 NOTE — ED PROVIDER NOTE - CLINICAL SUMMARY MEDICAL DECISION MAKING FREE TEXT BOX
Mendy Bazan MD: 54yo F with PMH of DM on metformin, HTN, gastric ulcer who presents with chest pain radiating to L arm since 10:30PM. Pt hemodynamically stable, afebrile. Well appearing. Concern for ACS. Will get labs, CXR, give asa Mendy Bazan MD: 56yo F with PMH of DM on metformin, HTN, gastric ulcer who presents with chest pain radiating to L arm since 10:30PM. Pt hemodynamically stable, afebrile. Well appearing. Concern for ACS. Will get labs, CXR, give asa  Attending Cammy Pal: 54 y/o female h/o DM, HTN presenting with chest pain with radiation to the left arm. upon arrival ekg shows no evidence of st elevation. no sob or pleuritic pain to suggest PE. concern for p ossible ACS> pt o nmonitor. will obtain labs, serial ekgs, likely cdu for further cardiac testing

## 2020-11-22 NOTE — ED CDU PROVIDER INITIAL DAY NOTE - OBJECTIVE STATEMENT
56yo F with PMH of DM on metformin, HTN, gastric ulcer who presents with chest pain since 10:30PM tonight. Pain is L sided, sharp, radiates to L arm worse with laying flat, nonexertional, associated with transient lightheadedness. No fever, URI symptoms, SOB, diaphoresis, N/V, syncope, palpitations, leg swelling, hormone use, clotting disorder, recent hospitalization or immobilization or focal neuro deficit. Took 81mg asa at 11PM. In ED, delta trops were negative and CXR WNL. pt sent to CDU for further work up of CP.

## 2020-11-22 NOTE — ED PROVIDER NOTE - PR
Problem: Patient Care Overview  Goal: Plan of Care/Patient Progress Review  Outcome: Improving  Neuro - A/Ox4, afebrile, denies pain.  Cardiac - SB/SR 50-80, heparin gtt at 750u/hr  Respiratory - 2L NC while sleeping, RA while awake.   GI - Low sodium, 2L FR   - Voiding adequately  Plan - Likely DC home today. Continue to monitor and notify MD of questions or concerns.       134

## 2020-11-22 NOTE — ED CDU PROVIDER DISPOSITION NOTE - CLINICAL COURSE
54yo F with PMH of DM on metformin, HTN, gastric ulcer who presents with chest pain since 10:30PM tonight. Pain is L sided, sharp, radiates to L arm worse with laying flat, nonexertional, associated with transient lightheadedness. No fever, URI symptoms, SOB, diaphoresis, N/V, syncope, palpitations, leg swelling, hormone use, clotting disorder, recent hospitalization or immobilization or focal neuro deficit. Took 81mg asa at 11PM. In ED, delta trops were negative and CXR WNL. pt sent to CDU for further work up of CP.    In CDU__________, stress showed_________. 56yo F with PMH of DM on metformin, HTN, gastric ulcer who presents with chest pain since 10:30PM tonight. Pain is L sided, sharp, radiates to L arm worse with laying flat, nonexertional, associated with transient lightheadedness. No fever, URI symptoms, SOB, diaphoresis, N/V, syncope, palpitations, leg swelling, hormone use, clotting disorder, recent hospitalization or immobilization or focal neuro deficit. Took 81mg asa at 11PM. In ED, delta trops were negative and CXR WNL. pt sent to CDU for further work up of CP.    In CDU, pts pain resolved. NAD. VSS. stress test was normal. case and plan discussed with Dr. Gutierrez; patient stable for discharge with outpt follow up.

## 2020-11-22 NOTE — ED CDU PROVIDER INITIAL DAY NOTE - PROGRESS NOTE DETAILS
CDU PROGRESS NOTE ZAIDA GONZALEZ: Received pt from ZAIDA Hsu at 1900 sign-out. Pt resting in stretcher in NAD. Case/plan reviewed. VSS. Pt ate dinner. No current complaints. Ambulatory around unit with steady gait. Pt without complaints. Will monitor. CDU NOTE ZAIDA GONZALEZ: Pt resting comfortably, feeling well without complaint. denies any current CP or abd pain. NAD, VSS. No events on telemetry. CV: S1S2 RRR, Lungs: CTA b/l, Abd: flat/ND/soft, mild ttp RUQ, epigastric and LUQ (reports sensation of soreness 3/10 with palpation, no pain without palpation). pt states she was having some indigestion/reflux before that resolved after getting pepcid, protonix, and maalox. pt to get a stress test today. will continue monitoring. CDU PROGRESS NOTE ZAIDA GONZALEZ: Received pt from ZAIDA Hsu at 0700 sign-out. Pt resting in stretcher in NAD. Case/plan reviewed. VSS. Pt ate dinner. No current complaints. Ambulatory around unit with steady gait. Pt without complaints. Will monitor. Pt resting comfortably. NAD. No complaints. VSS. no cp, shortness of breath or difficulty breathing, no events on tele will continue to monitor. pending stress in the am. CDU NOTE ZAIDA GONZALEZ: Pt resting comfortably, feeling well without complaint. no current CP or abd pain. NAD, VSS. No events on telemetry. CV: S1S2 RRR, Lungs: CTA b/l, Abd: flat/ND/NT/soft, no CVAT b/l. case and plan discussed with Dr. Gutierrez; pt stable for discharge with outpt follow up. CDU NOTE ZAIDA GONZALEZ: Pt resting comfortably, feeling well without complaint. no current CP or abd pain. NAD, VSS. No events on telemetry. CV: S1S2 RRR, Lungs: CTA b/l, Abd: flat/ND/NT/soft, no CVAT b/l. stress test was normal. case and plan discussed with Dr. Gutierrez; pt stable for discharge with outpt follow up.

## 2020-11-22 NOTE — ED ADULT NURSE REASSESSMENT NOTE - NS ED NURSE REASSESS COMMENT FT1
Received pt from JAYME Monroe , received pt alert and responsive, oriented x4, denies any respiratory distress, SOB, or difficulty breathing. Pt transferred to CDU for c/o lightheadedness,  intermittent L sided chest " dull, stabbing" pain currently 3/10. Pt also has c/o epigastric discomfort medicated as per order. On telemetry pt is SR hr: 60's. pending stress test in AM pt aware of plan and aware not to consume caffeine prior to test, verbalized understanding. Pt denies SOB, diaphoresis, heart palpitations, N/V, F/C.  IV in place, patent and free of signs of infiltration, V/S stable, pt afebrile. Pt educated on unit and unit rules, instructed patient to notify RN of any needed assistance, Pt verbalizes understanding, Call bell placed within reach. Safety maintained. Will continue to monitor. Received pt from JAYME Gómez, received pt alert and responsive, oriented x4, denies any respiratory distress, SOB, or difficulty breathing. Pt transferred to CDU for c/o lightheadedness,  intermittent L sided chest " dull, stabbing" pain currently 3/10. Pt also has c/o epigastric discomfort medicated as per order. On telemetry pt is SR hr: 60's. pending stress test in AM pt aware of plan and aware not to consume caffeine prior to test, verbalized understanding. Pt denies SOB, diaphoresis, heart palpitations, N/V, F/C.  IV in place, patent and free of signs of infiltration, V/S stable, pt afebrile. Pt educated on unit and unit rules, instructed patient to notify RN of any needed assistance, Pt verbalizes understanding, Call bell placed within reach. Safety maintained. Will continue to monitor.

## 2020-11-22 NOTE — ED CDU PROVIDER DISPOSITION NOTE - NSFOLLOWUPINSTRUCTIONS_ED_ALL_ED_FT
- stay hydrated.   - take all home medications as prescribed, take ______ as directed.  - follow up with your pcp in 1-2 days.  - follow up with cardiology in the next 1-2 days, call number attached to make an appointment  - return if symptoms worsen, have shortness of breath, difficulty breathing and all other concerns - stay hydrated.   - take all home medications as prescribed.  - follow up with your pcp and GI doctor in 1-2 days.  - follow up with cardiology in the next 1-2 days, call number attached to make an appointment  - return if symptoms worsen, have shortness of breath, difficulty breathing and all other concerns

## 2020-11-22 NOTE — ED ADULT NURSE REASSESSMENT NOTE - NS ED NURSE REASSESS COMMENT FT1
Patient ambulating to restroom with steady gait noted, denies dizziness. Patient awaiting troponin result at this time, sinus rhythm on cardiac monitor, no complaints at this time. Bed locked and in lowest position for safety.

## 2020-11-22 NOTE — ED PROVIDER NOTE - ATTENDING CONTRIBUTION TO CARE
Attending MD Cammy Pal:  I personally have seen and examined this patient.  Resident note reviewed and agree on plan of care and except where noted.  See HPI, PE, and MDM for details.

## 2020-11-22 NOTE — ED PROVIDER NOTE - NS ED ROS FT
General: denies fever, chills  HENT: denies nasal congestion, sore throat, rhinorrhea  Eyes: denies vision changes  CV: +chest pain  Resp: denies difficulty breathing, cough  Abdominal: denies nausea, vomiting, diarrhea, abdominal pain, blood in stool, dark stool  : denies pain with urination  MSK: denies recent trauma  Neuro: denies headaches, numbness, tingling, dizziness, +lightheadedness.  Skin: denies new rashes  Endocrine: denies recent weight loss

## 2020-11-22 NOTE — ED CDU PROVIDER INITIAL DAY NOTE - MEDICAL DECISION MAKING DETAILS
Attending Cammy Pal: 54 y/o female presenting with chest pain. placed in the cdu for tele and further cardiac testing.

## 2020-11-22 NOTE — ED ADULT NURSE REASSESSMENT NOTE - COMFORT CARE
warm blanket provided/darkened lights/plan of care explained/meal provided/ambulated to bathroom/po fluids offered/repositioned

## 2020-11-22 NOTE — ED PROVIDER NOTE - OBJECTIVE STATEMENT
Mendy Bazan MD: 56yo F with PMH of DM on metformin, HTN, gastric ulcer who presents with chest pain since 10:30PM tonight. Pain is L sided, sharp, radiates to L arm worse with laying flat, nonexertional, associated with transient lightheadedness. No fever, URI symptoms, SOB, diaphoresis, N/V, syncope, palpitations, leg swelling, hormone use, clotting disorder, recent hospitalization or immobilization or focal neuro deficit. Took 81mg asa at 11PM.

## 2020-11-22 NOTE — ED CDU PROVIDER DISPOSITION NOTE - PLAN OF CARE
- stay hydrated.   - take all home medications as prescribed.  - follow up with your pcp and GI doctor in 1-2 days.  - follow up with cardiology in the next 1-2 days, call number attached to make an appointment  - return if symptoms worsen, have shortness of breath, difficulty breathing and all other concerns

## 2020-11-22 NOTE — ED CDU PROVIDER INITIAL DAY NOTE - ATTENDING CONTRIBUTION TO CARE
Attending MD Cammy Pal:   I personally have seen and examined this patient.  Physician assistant note reviewed and agree on plan of care and except where noted.

## 2020-11-22 NOTE — ED CDU PROVIDER DISPOSITION NOTE - PATIENT PORTAL LINK FT
You can access the FollowMyHealth Patient Portal offered by [facility] [place] by registering at the following website: [email or web address]. By joining [employer]’s FollowMyHealth portal, you will also be able to view your health information using other applications (apps) compatible with our system.

## 2020-11-22 NOTE — ED CDU PROVIDER DISPOSITION NOTE - ATTENDING CONTRIBUTION TO CARE
Patient in CDU for evaluation of chest pains.  Patient reporting pains now resolved and feeling well.  Stress testing obtained in CDU and negative.  Will discharge with outpatient follow up.  Mauricio Gutierrez M.D.

## 2020-11-22 NOTE — ED ADULT NURSE NOTE - OBJECTIVE STATEMENT
55 year old Female, PMH: HTN, DM. Patient comes to the ER with reports of sharp chest pain around 22:30 tonight radiating to the left arm. Patient has experienced this before with no negative findings. Pain is worse when laying flat associated with some lightheadedness. Denies nausea, vomiting, diarrhea, shortness of breath, abdominal pain, fever, chills. A&Ox4, breathing spontaneous and unlabored, sinus rhythm on cardiac monitor. Took 81mg of ASA at home prior to arrival, ambulatory with steady gait noted. Bed locked and in lowest position with side rails up for safety. Call bell at bedside, educated to call for assistance.

## 2020-11-22 NOTE — ED ADULT NURSE REASSESSMENT NOTE - NS ED NURSE REASSESS COMMENT FT1
Patient calm, alert and oriented, resting in bed. Provided with more blankets, bed locked and in lowest position for safety.

## 2020-11-22 NOTE — ED CDU PROVIDER DISPOSITION NOTE - NSFOLLOWUPCLINICS_GEN_ALL_ED_FT
[facility]  Cardiology  [address] [place] [id number]  Phone: [phone]  Fax:

## 2020-11-23 ENCOUNTER — LABORATORY RESULT (OUTPATIENT)
Age: 55
End: 2020-11-23

## 2020-11-23 ENCOUNTER — OUTPATIENT (OUTPATIENT)
Dept: OUTPATIENT SERVICES | Facility: HOSPITAL | Age: 55
LOS: 1 days | End: 2020-11-23

## 2020-11-23 ENCOUNTER — APPOINTMENT (OUTPATIENT)
Dept: INTERNAL MEDICINE | Facility: CLINIC | Age: 55
End: 2020-11-23
Payer: MEDICAID

## 2020-11-23 VITALS
OXYGEN SATURATION: 98 % | HEART RATE: 96 BPM | HEIGHT: 63 IN | BODY MASS INDEX: 30.12 KG/M2 | WEIGHT: 170 LBS | DIASTOLIC BLOOD PRESSURE: 90 MMHG | SYSTOLIC BLOOD PRESSURE: 130 MMHG

## 2020-11-23 VITALS — TEMPERATURE: 97.1 F

## 2020-11-23 DIAGNOSIS — Z98.89 OTHER SPECIFIED POSTPROCEDURAL STATES: Chronic | ICD-10-CM

## 2020-11-23 LAB
APPEARANCE UR: SIGNIFICANT CHANGE UP
BACTERIA # UR AUTO: NEGATIVE — SIGNIFICANT CHANGE UP
BILIRUB UR-MCNC: NEGATIVE — SIGNIFICANT CHANGE UP
BLOOD UR QL VISUAL: NEGATIVE — SIGNIFICANT CHANGE UP
COLOR SPEC: YELLOW — SIGNIFICANT CHANGE UP
CRP SERPL-MCNC: < 4 MG/L — SIGNIFICANT CHANGE UP
ERYTHROCYTE [SEDIMENTATION RATE] IN BLOOD: 51 MM/HR — HIGH (ref 4–25)
GLUCOSE UR-MCNC: NEGATIVE — SIGNIFICANT CHANGE UP
HYALINE CASTS # UR AUTO: NEGATIVE — SIGNIFICANT CHANGE UP
KETONES UR-MCNC: NEGATIVE — SIGNIFICANT CHANGE UP
LEUKOCYTE ESTERASE UR-ACNC: NEGATIVE — SIGNIFICANT CHANGE UP
NITRITE UR-MCNC: NEGATIVE — SIGNIFICANT CHANGE UP
PH UR: 7.5 — SIGNIFICANT CHANGE UP (ref 5–8)
PROT UR-MCNC: 50 — SIGNIFICANT CHANGE UP
RBC CASTS # UR COMP ASSIST: SIGNIFICANT CHANGE UP (ref 0–?)
SP GR SPEC: 1.02 — SIGNIFICANT CHANGE UP (ref 1–1.04)
SQUAMOUS # UR AUTO: SIGNIFICANT CHANGE UP
UROBILINOGEN FLD QL: NORMAL — SIGNIFICANT CHANGE UP
WBC UR QL: SIGNIFICANT CHANGE UP (ref 0–?)

## 2020-11-23 PROCEDURE — 99214 OFFICE O/P EST MOD 30 MIN: CPT | Mod: GC

## 2020-11-23 RX ORDER — POTASSIUM CHLORIDE 1500 MG/1
20 TABLET, EXTENDED RELEASE ORAL
Qty: 90 | Refills: 2 | Status: DISCONTINUED | COMMUNITY
Start: 2018-08-30 | End: 2020-11-23

## 2020-11-23 RX ORDER — LISINOPRIL 10 MG/1
10 TABLET ORAL DAILY
Qty: 90 | Refills: 0 | Status: DISCONTINUED | COMMUNITY
Start: 2019-08-26 | End: 2020-11-23

## 2020-11-23 RX ORDER — METFORMIN HYDROCHLORIDE 500 MG/1
500 TABLET, COATED ORAL
Qty: 180 | Refills: 2 | Status: DISCONTINUED | COMMUNITY
Start: 2018-02-16 | End: 2020-11-23

## 2020-11-23 RX ORDER — ZOSTER VACCINE RECOMBINANT, ADJUVANTED 50 MCG/0.5
50 KIT INTRAMUSCULAR
Qty: 1 | Refills: 1 | Status: ACTIVE | COMMUNITY
Start: 2020-11-23 | End: 1900-01-01

## 2020-11-23 RX ORDER — POTASSIUM CHLORIDE 1500 MG/1
20 TABLET, EXTENDED RELEASE ORAL DAILY
Qty: 90 | Refills: 0 | Status: DISCONTINUED | COMMUNITY
Start: 2019-12-09 | End: 2020-11-23

## 2020-11-23 NOTE — PHYSICAL EXAM
[No Acute Distress] : no acute distress [Well Nourished] : well nourished [Well Developed] : well developed [Well-Appearing] : well-appearing [Normal Sclera/Conjunctiva] : normal sclera/conjunctiva [Normal Outer Ear/Nose] : the outer ears and nose were normal in appearance [No JVD] : no jugular venous distention [No Respiratory Distress] : no respiratory distress  [No Accessory Muscle Use] : no accessory muscle use [Clear to Auscultation] : lungs were clear to auscultation bilaterally [Normal Rate] : normal rate  [Regular Rhythm] : with a regular rhythm [Normal S1, S2] : normal S1 and S2 [No Murmur] : no murmur heard [No Edema] : there was no peripheral edema [Soft] : abdomen soft [Non Tender] : non-tender [Non-distended] : non-distended [No HSM] : no HSM [Normal Bowel Sounds] : normal bowel sounds [No Rash] : no rash [No Focal Deficits] : no focal deficits [Comprehensive Foot Exam Normal] : Right and left foot were examined and both feet are normal. No ulcers in either foot. Toes are normal and with full ROM.  Normal tactile sensation with monofilament testing throughout both feet

## 2020-11-24 ENCOUNTER — NON-APPOINTMENT (OUTPATIENT)
Age: 55
End: 2020-11-24

## 2020-11-24 DIAGNOSIS — E11.29 TYPE 2 DIABETES MELLITUS WITH OTHER DIABETIC KIDNEY COMPLICATION: ICD-10-CM

## 2020-11-24 DIAGNOSIS — G43.909 MIGRAINE, UNSPECIFIED, NOT INTRACTABLE, WITHOUT STATUS MIGRAINOSUS: ICD-10-CM

## 2020-11-24 DIAGNOSIS — I10 ESSENTIAL (PRIMARY) HYPERTENSION: ICD-10-CM

## 2020-11-24 DIAGNOSIS — Z00.00 ENCOUNTER FOR GENERAL ADULT MEDICAL EXAMINATION WITHOUT ABNORMAL FINDINGS: ICD-10-CM

## 2020-11-24 DIAGNOSIS — I50.30 UNSPECIFIED DIASTOLIC (CONGESTIVE) HEART FAILURE: ICD-10-CM

## 2020-11-24 DIAGNOSIS — R10.9 UNSPECIFIED ABDOMINAL PAIN: ICD-10-CM

## 2020-11-24 DIAGNOSIS — Z23 ENCOUNTER FOR IMMUNIZATION: ICD-10-CM

## 2020-11-24 LAB
24R-OH-CALCIDIOL SERPL-MCNC: 33.3 NG/ML — SIGNIFICANT CHANGE UP (ref 30–80)
CREAT UR-MCNC: 151 MG/DL — SIGNIFICANT CHANGE UP
MICROALBUMIN UR-MCNC: 15.1 MG/DL — SIGNIFICANT CHANGE UP
MICROALBUMIN/CREAT UR-RTO: 100 MG/G — HIGH (ref 0–30)

## 2020-11-24 NOTE — HISTORY OF PRESENT ILLNESS
[FreeTextEntry1] : F/U on recent atypical CP ED visit as well as HA, dizziness.  [de-identified] : 56 yo F w/ a PMHx of HTN, DM2 (A1C 6.5% 12/19), HFpEF (stage 1), hepatic steatosis, stage I obesity, and chronic abd discomfort (?functional dyspepsia) who presents after recent ED visit on 11/21 for L sided CP. Per patient the pain started in the L breast, possible superficial, but then radiated to the L arm. It started later in the evening after she noted an episode of hunger. Laying down made it worse. She often notes that she has L sided CP can occur in the morning or evening when laying down. She never switched to pantoprazole. No diaphoresis or nausea today.  \par \par Also, patient reported intermittent headaches that resolve with rest and elevated blood pressures at home. Highest systolic pressure was 190 on home machine on 10/9/20.

## 2020-11-24 NOTE — ASSESSMENT
[FreeTextEntry1] : 54 yo F w/ a PMHx of HTN, DM2 (A1C 6.5% 12/19), HFpEF (stage 1), hepatic steatosis, stage I obesity, and chronic abd discomfort (?functional dyspepsia) who presents for HA, nausea, and HTN at home.\par \par #Atypical CP\par Cardiac workup negative, possibly GERD induced as it is a positional issue\par -Stool antigen for H. pylori (EGD Bx neg for H. pylori 1/9/20)\par -Can continue 20mg famotidine twice daily until stool sample collected, then discontinue famotidine and start 40mg of pantoprazole 30 min before breakfast daily\par \par #HTN\par - Lisinopril 20mg daily\par - Amlodipine 10mg daily \par \par #Headaches a/w nausea likely induced by uncontrolled HTN (reported an episode of 190s), will r/o GCA given temporal location of HA, intermittent jaw pain/tightness, and recent visual disturbances (blurry vision/dizziness).\par -BP management as above\par -ESR/CRP, if positive/up-trending then can schedule temporal artery US and rheum referral\par -may take 400mg ibuprofen every 4-6 hours PRN\par \par #Type II DM \par -A1c improved to 5.7, now pre-DM range\par -can dc metformin 500mg bid per patient preference\par -Podiatric exam performed today WNL (11/23/20)\par -Optho scheduled for 2/11/21\par -check microalbumin/creatinine\par \par #HCM\par -PAP was performed with DNA co-testing 2017 (due 2022)\par -VZV ordered\par -Flu shot today\par -Colonoscopy last performed 1/20 recommended for 10yr f/u due 1/30\par -Mammo 9/20 was BiRADS 1, due in 2 yrs, 9/22\par \par Plan d/w Dr. Gorman\par \par MS, PGY3\par [employer]

## 2020-11-24 NOTE — END OF VISIT
[] : Resident [FreeTextEntry3] : 55yoF presents for follow up after recent ER visit for CP - workup in ER including NST WNL. Atypical CP possibly related to GERD - patient currently takes famotidine BID - will check H pylori stool antigen and switch to PPI to see if pain improves. Patient also notes increased BP at times at home - currently 130/90, continue to monitor closely and bring log to next visit. Patient also with c/o intermittent HA - describes pain as on top of head, not associated with visual changes but patient does note wearing her old glasses (stronger prescription) and not having eye exam recently, having dental issues but does not think headache is associated with jaw pain - will trial antiinflammatories, recommended eye exam. DM - A1c 5.7%, can stop metformin as patient having chronic GI issues.

## 2020-12-07 ENCOUNTER — RESULT REVIEW (OUTPATIENT)
Age: 55
End: 2020-12-07

## 2020-12-11 ENCOUNTER — NON-APPOINTMENT (OUTPATIENT)
Age: 55
End: 2020-12-11

## 2020-12-11 ENCOUNTER — APPOINTMENT (OUTPATIENT)
Dept: NEUROLOGY | Facility: CLINIC | Age: 55
End: 2020-12-11
Payer: MEDICAID

## 2020-12-11 VITALS — TEMPERATURE: 97 F

## 2020-12-11 LAB — H PYLORI AG STL QL: SIGNIFICANT CHANGE UP

## 2020-12-11 PROCEDURE — 93892 TCD EMBOLI DETECT W/O INJ: CPT

## 2020-12-11 PROCEDURE — 99072 ADDL SUPL MATRL&STAF TM PHE: CPT

## 2020-12-11 PROCEDURE — 93886 INTRACRANIAL COMPLETE STUDY: CPT

## 2020-12-14 NOTE — ED ADULT TRIAGE NOTE - NS ED NURSE DIRECT TO ROOM YN
Outpatient Speech Language Pathology   Adult Swallow Initial Evaluation  [facility]     Patient Name: Ajith Gallego  : 1947  MRN: [id number]  Today's Date: 2020         Visit Date: 2020   Patient Active Problem List   Diagnosis   • Neck mass   • Squamous cell carcinoma, ear, left   • Prostate cancer (CMS/HCC)        Past Medical History:   Diagnosis Date   • Arthritis    • Cancer of neck (CMS/HCC)    • Elevated cholesterol    • Hypertension    • Pacemaker    • Primary parotid gland squamous cell carcinoma (CMS/HCC) 2020   • Prostate cancer (CMS/HCC)    • Skin cancer    • Tattoo    • Wears glasses         Past Surgical History:   Procedure Laterality Date   • CATARACT EXTRACTION, BILATERAL     • NECK DISSECTION Left 2020    Procedure: MODIFIED RADICAL NECK DISSECTION LEFT;  Surgeon: Adams Patten MD;  Location: [facility];  Service: ENT;  Laterality: Left;   • OTHER SURGICAL HISTORY      left ear skin cancer removed   • PACEMAKER IMPLANTATION     • PAROTIDECTOMY Left 2020    Procedure: PAROTIDECTOMY WITH FACIAL NERVE DISSECTION LEFT;  Surgeon: Adams Patten MD;  Location: [facility];  Service: ENT;  Laterality: Left;   • PROSTATECTOMY           Visit Dx:     ICD-10-CM ICD-9-CM   1. Squamous cell carcinoma, ear, left  C44.229 173.22           SLP Adult Swallow Evaluation     Row Name 20 1300       Rehab Evaluation    Document Type  evaluation  -HG    Subjective Information  no complaints  -HG    Patient Observations  alert;cooperative;agree to therapy  -HG    Patient/Family/Caregiver Comments/Observations  No family present  -HG    Care Plan Review  evaluation/treatment results reviewed  -HG    Patient Effort  excellent  -HG    Symptoms Noted During/After Treatment  none  -HG       General Information    Patient Profile Reviewed  yes  -HG    Pertinent History Of Current Problem  Pt is a 73 year old male referred for prophylactic swallowing tx due to Left neck  squamous cell carcinoma, Left parotid squamous cell carcinoma, left facial skin lesion. On 11/18/20, pt underwent LEFT MODIFIED RADICAL NECK DISSECTION, LEFT PAROTIDECTOMY WITH FACIAL NERVE DISSECTION and REMOVAL OF LEFT FACIAL SKIN LESION 1 CM.  Pt to start chemo and radiation tx.   -HG    Current Method of Nutrition  regular textures;thin liquids  -HG    Precautions/Limitations, Vision  WFL;for purposes of eval  -HG    Precautions/Limitations, Hearing  hearing aid, bilaterally;WFL;for purposes of eval  -HG    Prior Level of Function-Communication  WFL  -HG    Prior Level of Function-Swallowing  no diet consistency restrictions  -HG    Plans/Goals Discussed with  patient  -HG    Barriers to Rehab  none identified  -HG       Oral Motor Structure and Function    Oral Lesions or Structural Abnormalities and/or variants  Pt does have left sided decreased ROM of motion when opening his mouth.   -HG    Dentition Assessment  natural, present and adequate  -HG    Secretion Management  WNL/WFL  -HG    Mucosal Quality  moist, healthy  -HG    Gag Response  WFL  -HG    Volitional Swallow  WFL  -HG    Volitional Cough  WFL  -HG       Oral Musculature and Cranial Nerve Assessment    Oral Motor General Assessment  WFL  -HG    Mandibular Impairment Detail, Cranial Nerve V (Trigeminal)  reduced strength on left  -HG    Oral Motor, Comment  Pt reports numbness to his left cheek and neck when he first wakes up but it improves as the day continues. Pt doesn't have pain and reports no difficulty chewing on that side of his mouth.  -HG       General Eating/Swallowing Observations    Respiratory Support Currently in Use  room air  -HG    Eating/Swallowing Skills  self-fed  -HG    Positioning During Eating  upright 90 degree;upright in chair  -HG    Utensils Used  spoon;cup;straw  -HG    Consistencies Trialed  regular textures;soft textures;pureed;thin liquids  -HG       Clinical Swallow Eval    Oral Prep Phase  WFL  -HG    Oral Transit   WFL  -HG    Oral Residue  WFL  -HG    Pharyngeal Phase  no overt signs/symptoms of pharyngeal impairment  -HG    Esophageal Phase  unremarkable  -HG    Clinical Swallow Evaluation Summary  CSE completed this date. Pt tolerated all consistencies with no s/s of aspiration: water via cup, straw, applesauce, peaches and yeimy cracker. SLP RECs starting prophylactic swallowing exercises and complete MBS in order to have baseline imaging.   -HG      User Key  (r) = Recorded By, (t) = Taken By, (c) = Cosigned By    Initials Name Provider Type    Iris Duran MS CCC-SLP Speech and Language Pathologist                        OP SLP Education     Row Name 12/14/20 1300       Education    Barriers to Learning  No barriers identified  -HG    Education Provided  Patient participated in establishing goals and treatment plan;Patient demonstrated recommended strategies;Patient requires further education on strategies, risks  -    Assessed  Learning needs;Learning motivation;Learning preferences;Learning readiness  -    Learning Motivation  Strong  -    Learning Method  Explanation;Demonstration;Written materials;Teach back  -    Teaching Response  Verbalized understanding;Demonstrated understanding;Reinforcement needed  -HG    Education Comments  SLP demonstrated with teachback all swallowing exercises provided via handout. Explained POC to follow pt t/o his tx and following his upcoming MBS to ensure he has no questions regarding exercises or to address swallowing difficulties if they arise.   -      User Key  (r) = Recorded By, (t) = Taken By, (c) = Cosigned By    Initials Name Effective Dates    Iris Duran MS [facility]-SLP 08/09/20 -           SLP OP Goals     Row Name 12/14/20 1300          Goal Type Needed    Goal Type Needed  Dysphagia;Other Adult Goals  -        Subjective Comments    Subjective Comments  Pt alert, cooperative, eager to start treatment.   -HG        Dysphagia Goals    Patient will  safely consume the recommended diet without complications such as aspiration pneumonia  regular/thin  -HG     Status: Patient will safely consume the recommended diet without complications such as aspiration pneumonia  New  -HG     Comments: Patient will improve oral skills to enhance safety and increase eating efficiency by increasing accuracy of back of tongue control  N/A at this time.  -HG     Patient will improve stage transition duration of swallow/improve timing to reduce food falling into the airway by decreasing swallow delay after neurosensory  stimulation  super-supraglottic swallow;mendelsohn maneuver;with cues  -HG     Status: Patient will improve stage transition duration of swallow/improve timing to reduce food falling into the airway by decreasing swallow delay after neurosensory  stimulation  New  -HG     Patient will increase laryngeal elevation to reduce residue that might fall into airway by completing  Mendelsohn maneuver;super-supraglottic swallow;falsetto/pitch glide;with cues  -HG     Status: Patient will increase laryngeal elevation to reduce residue that might fall into airway by completing  New  -HG     Patient will increase closure of larynx to keep food from falling into the airway by completing  super-supraglottic swallow;with cues  -HG     Status: Patient will increase closure of larynx to keep food from falling into the airway by completing  New  -HG     Patient will increase strength of tongue base and posterior pharyngeal walls to reduce residue that might fall into airway by completing  effotful swallow;Carla (tongue hold);with cues  -HG     Status: Patient will increase strength of tongue base and posterior pharyngeal walls to reduce residue that might fall into airway by completing  New  -HG     Patient will improve hyolaryngeal elevation via completing Luis E (head lift)  sustained lift (comment #/duration of lifts);repetitive lift (comment #/duration of lifts);with cues 30  sustained and 15 reps.  -HG     Status: Patient will improve hyolaryngeal elevation via completing Luis E (head lift)  New  -HG        Other Goals    Other Adult Goal- 1  Pt will complete stretching of strap muscles in order to improve ROM.  -HG     Status: Other Adult Goal- 1  New  -HG     Other Adult Goal- 2  Pt will complete MBS with RECs to follow as indicated.   -HG        SLP Time Calculation    SLP Goal Re-Cert Due Date  01/13/21  -HG       User Key  (r) = Recorded By, (t) = Taken By, (c) = Cosigned By    Initials Name Provider Type     Iris Zhang MS CCC-SLP Speech and Language Pathologist          OP SLP Assessment/Plan - 12/14/20 1300        SLP Assessment    Functional Problems  Swallowing   -HG    Impact on Function: Swallowing  Risk of aspiration;Risk of pneumonia   -HG    Clinical Impression: Swallowing  WNL   -HG    Functional Problems Comment  Pt reports no difficulty as this time. Only numbness to the outside of his left cheek and neck.   -HG    Clinical Impression Comments  MASA score of 198/200. Pt will functional swallow at this time based CSE.   -HG    Please refer to paper survey for additional self-reported information  Yes   -HG    Please refer to items scanned into chart for additional diagnostic informaiton and handouts as provided by clinician  Yes   -HG    SLP Diagnosis  Fxnl swallow at this time.   -HG    Prognosis  Excellent (comment)   -HG    Patient/caregiver participated in establishment of treatment plan and goals  Yes   -HG    Patient would benefit from skilled therapy intervention  Yes   -HG       SLP Plan    Frequency  1x/week   -HG    Duration  16 weeks   -HG    Planned CPT's?  SLP CLINICAL SWALLOW EVAL: 03707;SLP SWALLOW THERAPY: 14030   -HG    Expected Duration of Therapy Session (SLP Eval)  45   -HG    Plan Comments  Initiate Prophylactic dysphagia tx.    -HG      User Key  (r) = Recorded By, (t) = Taken By, (c) = Cosigned By    Initials Name Provider Type      Iris Zhang, MS CCC-SLP Speech and Language Pathologist                    Time Calculation:   SLP Start Time: 1300    Therapy Charges for Today     Code Description Service Date Service Provider Modifiers Qty    [id number] HC ST EVAL ORAL PHARYNG SWALLOW 4 12/14/2020 Iris Zhang, MS CCC-SLP GN 1                   Iris Zhang MS CCC-SLP  12/14/2020   Yes

## 2020-12-15 PROBLEM — Z01.419 ENCOUNTER FOR ROUTINE GYNECOLOGICAL EXAMINATION: Status: RESOLVED | Noted: 2017-08-22 | Resolved: 2020-12-15

## 2020-12-21 ENCOUNTER — APPOINTMENT (OUTPATIENT)
Dept: RHEUMATOLOGY | Facility: CLINIC | Age: 55
End: 2020-12-21
Payer: MEDICAID

## 2020-12-21 ENCOUNTER — OUTPATIENT (OUTPATIENT)
Dept: OUTPATIENT SERVICES | Facility: HOSPITAL | Age: 55
LOS: 1 days | End: 2020-12-21

## 2020-12-21 VITALS
HEART RATE: 94 BPM | BODY MASS INDEX: 30.48 KG/M2 | SYSTOLIC BLOOD PRESSURE: 140 MMHG | OXYGEN SATURATION: 99 % | HEIGHT: 63 IN | WEIGHT: 172 LBS | DIASTOLIC BLOOD PRESSURE: 88 MMHG

## 2020-12-21 VITALS — TEMPERATURE: 97.3 F

## 2020-12-21 DIAGNOSIS — Z98.89 OTHER SPECIFIED POSTPROCEDURAL STATES: Chronic | ICD-10-CM

## 2020-12-21 PROCEDURE — 99203 OFFICE O/P NEW LOW 30 MIN: CPT | Mod: GC

## 2020-12-22 NOTE — PHYSICAL EXAM
[General Appearance - Alert] : alert [General Appearance - In No Acute Distress] : in no acute distress [Sclera] : the sclera and conjunctiva were normal [PERRL With Normal Accommodation] : pupils were equal in size, round, and reactive to light [Extraocular Movements] : extraocular movements were intact [Outer Ear] : the ears and nose were normal in appearance [Oropharynx] : the oropharynx was normal [Neck Appearance] : the appearance of the neck was normal [Neck Cervical Mass (___cm)] : no neck mass was observed [Jugular Venous Distention Increased] : there was no jugular-venous distention [Thyroid Diffuse Enlargement] : the thyroid was not enlarged [Thyroid Nodule] : there were no palpable thyroid nodules [Auscultation Breath Sounds / Voice Sounds] : lungs were clear to auscultation bilaterally [Heart Rate And Rhythm] : heart rate was normal and rhythm regular [Heart Sounds] : normal S1 and S2 [Heart Sounds Gallop] : no gallops [Murmurs] : no murmurs [Heart Sounds Pericardial Friction Rub] : no pericardial rub [Full Pulse] : the pedal pulses are present [Edema] : there was no peripheral edema [Bowel Sounds] : normal bowel sounds [Abdomen Soft] : soft [Abdomen Tenderness] : non-tender [Abdomen Mass (___ Cm)] : no abdominal mass palpated [Abnormal Walk] : normal gait [Nail Clubbing] : no clubbing  or cyanosis of the fingernails [Musculoskeletal - Swelling] : no joint swelling seen [Motor Tone] : muscle strength and tone were normal [Skin Color & Pigmentation] : normal skin color and pigmentation [Skin Turgor] : normal skin turgor [] : no rash [Deep Tendon Reflexes (DTR)] : deep tendon reflexes were 2+ and symmetric [Sensation] : the sensory exam was normal to light touch and pinprick [No Focal Deficits] : no focal deficits [FreeTextEntry1] : No large vessel bruit

## 2020-12-22 NOTE — ASSESSMENT
[FreeTextEntry1] : 55 Y F with a PMHx of HTN, DM, Obesity presents for evaluation of headaches in suspicion for GCA. \par \par Impression: \par # Headaches, elevated ESR. \par On hx the patient has periodic headaches, around 4 times week, located in a band fashion from forehead to the back of her head), dull in nature, resolve with Naproxen and Flexeril.\par Reports blurry vision in both eye when reading but far vision has remained at baseline, no instance visual field deficits. \par No scalp tenderness, no jaw claudication, no shoulder or pelvic girdle pain or stiffness, no un explained fatigue or fevers. \par Pt reports her headaches get worse with stress from financial issues as well as when she is trying to read. \par On exam she has no scalp tenderness, no large vessel bruits. \par ESR is in the 50s but this was the case 3 years ago as well. \par The primary physician ordered a vascular US of head but this was done showing intracranial vessels rather than extracranial vessels which is usually used for GCA. \par At this moment we have low suspicion for GCA based on Hx, exam and labs. \par Headaches are more likely from tension source or from incorrect prescription of eye glasses. \par Pt requires no further w/u for GCA but if symptoms were to change pt can return for a repeat evaluation in the Rheumatology clinic.\par \par DW with Dr. Grant. \par \par August Shields MD\par Rheumatology Fellow

## 2020-12-22 NOTE — HISTORY OF PRESENT ILLNESS
[FreeTextEntry1] : 55 Y F with a PMHx of HTN, DM, Obesity presents for evaluation of headaches in suspicion for GCA. \par Pt says she gets periodic headaches, around 4 times week, located in a band fashion (from forehead to the back of her head), dull in nature, resolve with Naproxen and Flexeril.\par The headaches started 2 months ago.\par Reports blurry vision in both eye when reading but far vision has remained at baseline, no instance visual field deficits. \par No scalp tenderness, no jaw claudication, no shoulder or pelvic girdle pain or stiffness, no un explained fatigue or fevers. \par No n/v/d, abdominal pain, fever, chills, cough, CP or SOB. \par Pt reports her headaches get worse with stress from financial issues as well as when she is trying to read.

## 2020-12-23 DIAGNOSIS — R51.9 HEADACHE, UNSPECIFIED: ICD-10-CM

## 2021-02-03 ENCOUNTER — APPOINTMENT (OUTPATIENT)
Dept: INTERNAL MEDICINE | Facility: CLINIC | Age: 56
End: 2021-02-03
Payer: MEDICAID

## 2021-02-03 ENCOUNTER — OUTPATIENT (OUTPATIENT)
Dept: OUTPATIENT SERVICES | Facility: HOSPITAL | Age: 56
LOS: 1 days | End: 2021-02-03

## 2021-02-03 VITALS
DIASTOLIC BLOOD PRESSURE: 106 MMHG | SYSTOLIC BLOOD PRESSURE: 152 MMHG | WEIGHT: 170 LBS | BODY MASS INDEX: 30.12 KG/M2 | HEIGHT: 63 IN

## 2021-02-03 DIAGNOSIS — Z98.89 OTHER SPECIFIED POSTPROCEDURAL STATES: Chronic | ICD-10-CM

## 2021-02-03 DIAGNOSIS — R40.0 SOMNOLENCE: ICD-10-CM

## 2021-02-03 PROCEDURE — ZZZZZ: CPT

## 2021-02-03 RX ORDER — FAMOTIDINE 20 MG/1
20 TABLET, FILM COATED ORAL
Qty: 60 | Refills: 3 | Status: DISCONTINUED | COMMUNITY
Start: 2019-07-23 | End: 2021-02-03

## 2021-02-03 NOTE — ADDENDUM
[FreeTextEntry1] : reviewed with resident via telephone due to covid 19 Agree with residents evaluation and plan [email or web address]

## 2021-02-03 NOTE — ASSESSMENT
[FreeTextEntry1] : 56 yo F w/ a PMHx of HTN, DM2 (now pre-diabetes w/ A1C 5.7 11/22/20), HFpEF (stage 1), hepatic steatosis, stage I obesity, and chronic abd discomfort (?functional dyspepsia) who presents for a follow up telephone appointment. \par \par #Atypical CP\par -Now largely resolved; Cardiac workup negative, possibly GERD induced as it is a positional issue\par -Stool antigen for H. pylori negative (EGD Bx neg for H. pylori 1/9/20), will d/c famotidine and start 40mg of pantoprazole 30 min before breakfast daily\par \par #R shoulder pain\par -arthritis vs radiculopathy vs strain\par -pt counseled to to avoid exacerbating activity\par -PT ordered; if symptoms don't improve,will obtain XRAY\par \par #HTN\par -pt hasn't been measuring BP consistently at home, today it's 152/106\par - Lisinopril 20mg daily\par - Amlodipine 10mg daily \par -will start on HCTZ 12.5mg daily due to persistently elevated BP and get BMP in 2 weeks; if potassium low, will plan to start on KCL supplement; if BP still elevated will increase HCTZ to 25mg daily\par \par #Headaches\par -now improved\par -pt seen by rheum, who believe that GCA is unlikely\par -BP management as above\par -tylenol for pain prn\par \par #Type II DM \par -A1c improved to 5.7, now pre-DM range\par -dc metformin 500mg bid per patient preference, will start on januvia to ensure that glucose levels are well controlled and A1C doesn't rebound\par -Optho scheduled for 2/11/21\par - microalbumin/creatinine elevated, currently on lisinopril\par \par #HCM\par -Mammo on 9/28/20 showing no mammographic evidence of malignancy, with plan for repeat in 1 yr\par -PAP WNL 2017, will order for GYN exam/pap smear\par -lipid panel , cholesterol 182, triglycerides 88 and HDL 60 on Dec 2019, will obtain repeat on next visit\par -ASCVD 13.1% 10 year risk(intermediate), but pt refusing statin therapy\par \par Time spent 40 minutes\par \par Case discussed with Dr. Balderas\par \par RTC in 2 weeks for BP check/BMP draw and then in 10 weeks for visit\par \par

## 2021-02-03 NOTE — HISTORY OF PRESENT ILLNESS
[Verbal consent obtained from patient] : the patient, [unfilled] [FreeTextEntry1] : Follow up [de-identified] : 54 yo F w/ a PMHx of HTN, DM2 (now pre-diabetes w/ A1C 5.7 11/22/20), HFpEF (stage 1), hepatic steatosis, stage I obesity, and chronic abd discomfort (?functional dyspepsia) who presents for a follow up telephone appointment. The pt conveys that her headaches and intermittent chest pain have largely resolved. She reports that she hasn't been checking her BP frequently at home, unless she has a headache. She reports compliance with her BP regimen and reports to limiting her intake of salt. \par \par Pt complains of intermittent R shoulder pain. It occurs when she carries heavy objects and has been ongoing for about two weeks. Pt reports numbness that goes away with motion but denies weakness. Pt reports that she has a hx of surgery at this shoulder for a tear 2 yrs ago.\par \par

## 2021-02-03 NOTE — REVIEW OF SYSTEMS
[Joint Pain] : joint pain [Fever] : no fever [Chills] : no chills [Discharge] : no discharge [Nasal Discharge] : no nasal discharge [Sore Throat] : no sore throat [Palpitations] : no palpitations [Lower Ext Edema] : no lower extremity edema [Orthopnea] : no orthopnea [Paroxysmal Nocturnal Dyspnea] : no paroxysmal nocturnal dyspnea [Shortness Of Breath] : no shortness of breath [Cough] : no cough [Abdominal Pain] : no abdominal pain [Nausea] : no nausea [Vomiting] : no vomiting [Heartburn] : no heartburn [Melena] : no melena [Hematuria] : no hematuria [Skin Rash] : no skin rash [Suicidal] : not suicidal [Depression] : no depression

## 2021-02-04 DIAGNOSIS — I10 ESSENTIAL (PRIMARY) HYPERTENSION: ICD-10-CM

## 2021-02-04 DIAGNOSIS — M25.511 PAIN IN RIGHT SHOULDER: ICD-10-CM

## 2021-02-04 DIAGNOSIS — Z12.31 ENCOUNTER FOR SCREENING MAMMOGRAM FOR MALIGNANT NEOPLASM OF BREAST: ICD-10-CM

## 2021-02-04 DIAGNOSIS — G43.909 MIGRAINE, UNSPECIFIED, NOT INTRACTABLE, WITHOUT STATUS MIGRAINOSUS: ICD-10-CM

## 2021-02-04 DIAGNOSIS — E11.29 TYPE 2 DIABETES MELLITUS WITH OTHER DIABETIC KIDNEY COMPLICATION: ICD-10-CM

## 2021-02-09 ENCOUNTER — RX RENEWAL (OUTPATIENT)
Age: 56
End: 2021-02-09

## 2021-02-11 ENCOUNTER — APPOINTMENT (OUTPATIENT)
Dept: OPHTHALMOLOGY | Facility: CLINIC | Age: 56
End: 2021-02-11

## 2021-02-12 ENCOUNTER — APPOINTMENT (OUTPATIENT)
Dept: INTERNAL MEDICINE | Facility: CLINIC | Age: 56
End: 2021-02-12

## 2021-02-12 ENCOUNTER — OUTPATIENT (OUTPATIENT)
Dept: OUTPATIENT SERVICES | Facility: HOSPITAL | Age: 56
LOS: 1 days | End: 2021-02-12

## 2021-02-12 VITALS — TEMPERATURE: 96.6 F

## 2021-02-12 DIAGNOSIS — E11.29 TYPE 2 DIABETES MELLITUS WITH OTHER DIABETIC KIDNEY COMPLICATION: ICD-10-CM

## 2021-02-12 DIAGNOSIS — I10 ESSENTIAL (PRIMARY) HYPERTENSION: ICD-10-CM

## 2021-02-12 DIAGNOSIS — Z98.89 OTHER SPECIFIED POSTPROCEDURAL STATES: Chronic | ICD-10-CM

## 2021-02-19 ENCOUNTER — APPOINTMENT (OUTPATIENT)
Dept: INTERNAL MEDICINE | Facility: CLINIC | Age: 56
End: 2021-02-19

## 2021-02-19 ENCOUNTER — OUTPATIENT (OUTPATIENT)
Dept: OUTPATIENT SERVICES | Facility: HOSPITAL | Age: 56
LOS: 1 days | End: 2021-02-19

## 2021-02-19 ENCOUNTER — RESULT REVIEW (OUTPATIENT)
Age: 56
End: 2021-02-19

## 2021-02-19 VITALS — TEMPERATURE: 96.8 F

## 2021-02-19 DIAGNOSIS — I10 ESSENTIAL (PRIMARY) HYPERTENSION: ICD-10-CM

## 2021-02-19 DIAGNOSIS — E11.9 TYPE 2 DIABETES MELLITUS WITHOUT COMPLICATIONS: ICD-10-CM

## 2021-02-19 DIAGNOSIS — Z98.89 OTHER SPECIFIED POSTPROCEDURAL STATES: Chronic | ICD-10-CM

## 2021-02-19 DIAGNOSIS — Z00.00 ENCOUNTER FOR GENERAL ADULT MEDICAL EXAMINATION WITHOUT ABNORMAL FINDINGS: ICD-10-CM

## 2021-02-19 LAB
ANION GAP SERPL CALC-SCNC: 11 MMOL/L — SIGNIFICANT CHANGE UP (ref 7–14)
BUN SERPL-MCNC: 17 MG/DL — SIGNIFICANT CHANGE UP (ref 7–23)
CALCIUM SERPL-MCNC: 9.5 MG/DL — SIGNIFICANT CHANGE UP (ref 8.4–10.5)
CHLORIDE SERPL-SCNC: 100 MMOL/L — SIGNIFICANT CHANGE UP (ref 98–107)
CO2 SERPL-SCNC: 29 MMOL/L — SIGNIFICANT CHANGE UP (ref 22–31)
CREAT SERPL-MCNC: 0.77 MG/DL — SIGNIFICANT CHANGE UP (ref 0.5–1.3)
GLUCOSE SERPL-MCNC: 139 MG/DL — HIGH (ref 70–99)
POTASSIUM SERPL-MCNC: 3.2 MMOL/L — LOW (ref 3.5–5.3)
POTASSIUM SERPL-SCNC: 3.2 MMOL/L — LOW (ref 3.5–5.3)
SODIUM SERPL-SCNC: 140 MMOL/L — SIGNIFICANT CHANGE UP (ref 135–145)

## 2021-03-10 ENCOUNTER — APPOINTMENT (OUTPATIENT)
Dept: OPHTHALMOLOGY | Facility: CLINIC | Age: 56
End: 2021-03-10

## 2021-03-12 ENCOUNTER — NON-APPOINTMENT (OUTPATIENT)
Age: 56
End: 2021-03-12

## 2021-03-12 ENCOUNTER — APPOINTMENT (OUTPATIENT)
Dept: OPHTHALMOLOGY | Facility: CLINIC | Age: 56
End: 2021-03-12
Payer: MEDICAID

## 2021-03-12 PROCEDURE — 92015 DETERMINE REFRACTIVE STATE: CPT

## 2021-03-12 PROCEDURE — 92004 COMPRE OPH EXAM NEW PT 1/>: CPT

## 2021-03-12 PROCEDURE — 99072 ADDL SUPL MATRL&STAF TM PHE: CPT

## 2021-04-12 ENCOUNTER — APPOINTMENT (OUTPATIENT)
Dept: OPHTHALMOLOGY | Facility: CLINIC | Age: 56
End: 2021-04-12

## 2021-04-26 NOTE — PATIENT PROFILE ADULT - NSPROPOAURINARYCATHETER_GEN_A_NUR
Certification for Inpatient


Patient admitted to: Inpatient


With expected LOS: >2 Midnights


Patient will require the following post-hospital care: None


Practitioner: I am a practitioner with admitting privileges, knowledge of 

patient current condition, hospital course, and medical plan of care.


Services: Services provided to patient in accordance with Admission requirements

found in Title 42 Section 412.3 of the Code of Federal Regulations





Patient History


Date of Service: 04/26/21


Reason for admission: chest pain 


History of Present Illness: 


Ms. Dotson is a 49 yo female with HTN, DM, and HLD here today for 10/10 

sternal chest pain that has been occurring for the last month with exertion. The

pain is constant but varies in severity. Initially she just thought her OTC acid

pills weren't working, but after she was started on pantoprazole after having an

EGD and colonoscopy, her symptoms were still present. She started to have more 

severe pain especially with exertion like walking on the track or chasing after 

her grandchild. Along with the pain, she would have elevations in her BP, 

lightheadedness, diaphoresis, and tachycardia. Denies nausea, vomiting, SOB. 

Pain improves with sitting down. Today the pain started around 3pm when she was 

watering her garden. She has a family history of DM and CAD. Trop 19.5. Glu 128.

. 


Allergies





No Known Allergies Allergy (Verified 05/04/18 08:54)


   





Home Medications: 








Amlodipine Besylate 1 tab PO DAILY 05/04/18 


Pantoprazole Sodium 40 mg PO DAILY 05/04/18 


lisinopriL [Prinivil*] 10 mg PO DAILY 05/04/18 


Codeine/APAP [Tylenol W/Codeine #3 tab] 1 tab PO Q4HP PRN #30 tab 05/05/18 


Sulfamethoxazole/Trimethoprim [Bactrim Ds Tablet] 1 each PO BID #10 tablet 

05/05/18 








- Past Medical/Surgical History


Has patient received pneumonia vaccine in the past: No


Diabetic: Yes


-: htn


-: gerd


-: hld


-: hiatal hernia


-: dm


-: h/o nephrolithiasis 


-: c sections x 3


-: cholecystectomy 


Psychosocial/ Personal History: takes care of her grandchild





- Family History


  ** Mother


-: Heart disease, Diabetes





  ** Father


-: Diabetes, Stroke, Cancer


Notes: prostate cancer





  ** Brother


-: Heart disease, Diabetes





- Social History


Smoking Status: Never smoker


Alcohol use: Yes


CD- Drugs: No


Caffeine use: Yes


Place of Residence: Home





Review of Systems


General: Sweats, As per HPI


Eyes: Unremarkable


ENT: Unremarkable


Respiratory: Unremarkable


Cardiovascular: Chest Pain, Palpitations, Light Headedness, As per HPI


Gastrointestinal: Unremarkable


Genitourinary: Unremarkable


Musculoskeletal: Unremarkable


Integumentary: Unremarkable


Neurological: Unremarkable


Lymphatics: Unremarkable





Physical Examination





- Vital Signs


Temperature: 98.2 F


Blood Pressure: 135/61


Pulse: 84


Respirations: 74


Pulse Ox (%): 97





- Physical Exam


General: Alert, In no apparent distress, Oriented x3, Cooperative


HEENT: Atraumatic, Normocephalic, PERRLA, Mucous membr. moist/pink, EOMI, 

Sclerae nonicteric


Neck: Supple, 2+ carotid pulse no bruit, JVD not distended, No Thyromegaly, No 

LAD


Respiratory: Clear to auscultation bilaterally, Normal air movement


Cardiovascular: No edema, Normal pulses, Regular rate/rhythm, Normal S1 S2, No 

gallops, No rubs, No murmurs


Capillary refill: <2 Seconds


Gastrointestinal: Normal bowel sounds, Soft and benign, Non-distended, No 

ascites, No tenderness, No masses, No rebound, No guarding


Musculoskeletal: No clubbing, No swelling, No contractures, No erythema, No 

tenderness, No warmth


Integumentary: No rashes, No breakdown, No significant lesion, No 

tenderness/swelling, No erythema, No warmth, No cyanosis


Neurological: Normal gait, Normal speech, Normal strength at 5/5 x4 extr, Normal

tone, Sensation intact, Cranial nerves 3-12 intact, Normal affect


Lymphatics: No axilla or inguinal lymphadenopathy





- Studies


Laboratory Data (last 24 hrs)





04/25/21 23:14: Lipase Cancelled


04/25/21 22:50: PT 11.8, INR 1.03


04/25/21 22:50: WBC 9.40, Hgb 14.5, Hct 44.9, Plt Count 318


04/25/21 22:50: Sodium 138, Potassium 4.2, BUN 10, Creatinine 0.48 L, Glucose 

128 H, Magnesium 2.2, Total Bilirubin 0.3,  H, ALT 59, Alkaline Phosphat

ase 92, Lipase 121








Assessment and Plan





- Plan





Assessment


NSTEMI


HTN


DM


HLD





Plan


Cardiology consulted


ASA 324mg and full dose Lovenox given in ED


1 mg/kg Lovenox q12h, daily ASA, metoprolol, statin


trend troponins and EKG, morphine and NTG PRN 


lipid panel, TSH/T4, A1c pending


dietitian consulted


BP currently stable, will continue to monitor


sliding scale insulin and accuchecks 





Discharge Plan: Home


Plan to discharge in: 48 Hours





- Advance Directives


Does patient have a Living Will: No


Does patient have a Durable POA for Healthcare: No





- Code Status/Comfort Care


Code Status Assessed: Yes (full code)


Critical Care: No


Time Spent Managing Pts Care (In Minutes): 70 no

## 2021-04-28 ENCOUNTER — RX RENEWAL (OUTPATIENT)
Age: 56
End: 2021-04-28

## 2021-06-22 ENCOUNTER — OUTPATIENT (OUTPATIENT)
Dept: OUTPATIENT SERVICES | Facility: HOSPITAL | Age: 56
LOS: 1 days | End: 2021-06-22

## 2021-06-22 ENCOUNTER — RESULT CHARGE (OUTPATIENT)
Age: 56
End: 2021-06-22

## 2021-06-22 ENCOUNTER — APPOINTMENT (OUTPATIENT)
Dept: INTERNAL MEDICINE | Facility: CLINIC | Age: 56
End: 2021-06-22
Payer: MEDICAID

## 2021-06-22 VITALS — TEMPERATURE: 96.8 F

## 2021-06-22 DIAGNOSIS — Z98.89 OTHER SPECIFIED POSTPROCEDURAL STATES: Chronic | ICD-10-CM

## 2021-06-22 PROCEDURE — 99213 OFFICE O/P EST LOW 20 MIN: CPT | Mod: GE

## 2021-06-22 RX ORDER — ZOSTER VACCINE RECOMBINANT, ADJUVANTED 50 MCG/0.5
50 KIT INTRAMUSCULAR
Qty: 1 | Refills: 0 | Status: ACTIVE | COMMUNITY
Start: 2021-06-22 | End: 1900-01-01

## 2021-06-22 NOTE — PHYSICAL EXAM
[No Acute Distress] : no acute distress [Normal Sclera/Conjunctiva] : normal sclera/conjunctiva [Normal Outer Ear/Nose] : the outer ears and nose were normal in appearance [No JVD] : no jugular venous distention [No Respiratory Distress] : no respiratory distress  [No Accessory Muscle Use] : no accessory muscle use [Clear to Auscultation] : lungs were clear to auscultation bilaterally [Normal Rate] : normal rate  [Regular Rhythm] : with a regular rhythm [Normal S1, S2] : normal S1 and S2 [No Murmur] : no murmur heard [No Edema] : there was no peripheral edema [Soft] : abdomen soft [Non Tender] : non-tender [Non-distended] : non-distended [No HSM] : no HSM [Normal Affect] : the affect was normal [Normal Insight/Judgement] : insight and judgment were intact

## 2021-06-23 DIAGNOSIS — I10 ESSENTIAL (PRIMARY) HYPERTENSION: ICD-10-CM

## 2021-06-23 DIAGNOSIS — E11.29 TYPE 2 DIABETES MELLITUS WITH OTHER DIABETIC KIDNEY COMPLICATION: ICD-10-CM

## 2021-06-23 DIAGNOSIS — G43.909 MIGRAINE, UNSPECIFIED, NOT INTRACTABLE, WITHOUT STATUS MIGRAINOSUS: ICD-10-CM

## 2021-06-23 DIAGNOSIS — R07.9 CHEST PAIN, UNSPECIFIED: ICD-10-CM

## 2021-06-23 DIAGNOSIS — K21.9 GASTRO-ESOPHAGEAL REFLUX DISEASE WITHOUT ESOPHAGITIS: ICD-10-CM

## 2021-06-23 NOTE — HISTORY OF PRESENT ILLNESS
[FreeTextEntry1] : Follow up  [de-identified] : 55 yo F w/ a PMHx of HTN, DM2 (now pre-diabetes w/ A1C 5.7 11/22/20), HFpEF (stage 1), hepatic steatosis, stage I obesity, and chronic abd discomfort (?functional dyspepsia) who presents for a follow up appointment. Pt conveys that she has been doing well overall and denies any new acute issues. She conveys that she has not been taking her Januvia consistently because she is concerned for weight gain. Pt reports that she has been trying to cut down on her intake of fatty food. The pt's BPs have been 120s/70s at home and she reports compliance with her BP regimen. Her GERD symptoms have been well controlled with protonix.

## 2021-06-23 NOTE — END OF VISIT
[] : Resident [FreeTextEntry3] : 55 y/o F with h/o HFpEF, HTN, GERD on protonix, DM, hepatic steatosis here for follow up\par Has had R shoulder pain - never went to PT.\par Has h/o headches - seen by Rheum to r/o GCA.\par On  januvia - has not taken it for 3 months\par Agree with plan as per Dr. Latham [Time Spent: ___ minutes] : I have spent [unfilled] minutes of time on the encounter.

## 2021-06-23 NOTE — REVIEW OF SYSTEMS
[Fever] : no fever [Chills] : no chills [Discharge] : no discharge [Nasal Discharge] : no nasal discharge [Chest Pain] : no chest pain [Palpitations] : no palpitations [Lower Ext Edema] : no lower extremity edema [Orthopnea] : no orthopnea [Shortness Of Breath] : no shortness of breath [Abdominal Pain] : no abdominal pain [Nausea] : no nausea [Diarrhea] : diarrhea [Vomiting] : no vomiting [Melena] : no melena [Hematuria] : no hematuria [Suicidal] : not suicidal [Depression] : no depression

## 2021-06-23 NOTE — PLAN
[FreeTextEntry1] : 55 yo F w/ a PMHx of HTN, DM2 (now pre-diabetes w/ A1C 5.7 11/22/20), HFpEF (stage 1), hepatic steatosis, stage I obesity, and chronic abd discomfort (?functional dyspepsia) who presents for a follow up telephone appointment.

## 2021-06-23 NOTE — ASSESSMENT
[FreeTextEntry1] : 57 yo F w/ a PMHx of HTN, DM2 (now pre-diabetes w/ A1C 5.7 11/22/20), HFpEF (stage 1), hepatic steatosis, stage I obesity, and chronic abd discomfort (?functional dyspepsia) who presents for a follow up appointment. \par \par #Atypical CP\par -Now largely resolved; Cardiac workup negative, possibly GERD induced as it is a positional issue\par -Stool antigen for H. pylori negative (EGD Bx neg for H. pylori 1/9/20), started on 40mg of pantoprazole 30 min before breakfast daily, which has improved symptoms \par \par #R shoulder pain\par -arthritis vs radiculopathy vs strain\par -pt counseled to to avoid exacerbating activity\par -PT ordered but pt didn't go, will re-order; if symptoms don't improve,will obtain XRAY\par \par #HTN\par -120s-/70s at home, well controlled\par - Lisinopril 20mg daily\par - Amlodipine 10mg daily \par -c/w HCTZ 12.5mg daily\par  \par #Headaches\par -now improved\par -pt seen by rheum, who believe that GCA is unlikely\par -BP management as above\par -tylenol for pain prn\par \par #Type II DM \par - A1C 6.1, now pre-DM range\par -dc metformin 500mg bid per patient preference, started on januvia to ensure that glucose levels are well controlled and A1C doesn't rebound, pt has not been taking medication; pt would like to hold off on januvia and get a repeat A1C in 3 months prior to decision to restart it\par -pt counseled that januvia is not significantly associated with weight gain\par -saw Optho on 3/12/21, pt to follow up in 6 months\par - microalbumin/creatinine elevated, currently on lisinopril\par \par #HCM\par -Mammo on 9/28/20 showing no mammographic evidence of malignancy, with plan for repeat in 1 yr\par -PAP WNL 2017, GYN referral ordered on last visit but pt didn't go, will re-order referral for visit/PAP\par -ASCVD >7.5% 10 year risk, but pt refusing statin therapy; would like to continue lifestyle changes and wants this readdressed on next visit \par \par \par Case discussed with Dr. Gonzalez\par \par RTC in 3 months

## 2021-07-04 LAB — HBA1C MFR BLD HPLC: 6.1

## 2021-08-09 ENCOUNTER — RX RENEWAL (OUTPATIENT)
Age: 56
End: 2021-08-09

## 2021-08-10 NOTE — ED PROVIDER NOTE - NS ED MD DISPO DISCHARGE CCDA
Patients mother called requesting son's immunizations report. Sent via e-mail [email or web address].      [place]    
Patient/Caregiver provided printed discharge information.

## 2021-09-13 ENCOUNTER — APPOINTMENT (OUTPATIENT)
Dept: OPHTHALMOLOGY | Facility: CLINIC | Age: 56
End: 2021-09-13
Payer: MEDICAID

## 2021-09-13 ENCOUNTER — NON-APPOINTMENT (OUTPATIENT)
Age: 56
End: 2021-09-13

## 2021-09-13 PROCEDURE — 92012 INTRM OPH EXAM EST PATIENT: CPT

## 2021-10-04 ENCOUNTER — RESULT REVIEW (OUTPATIENT)
Age: 56
End: 2021-10-04

## 2021-10-04 ENCOUNTER — OUTPATIENT (OUTPATIENT)
Dept: OUTPATIENT SERVICES | Facility: HOSPITAL | Age: 56
LOS: 1 days | End: 2021-10-04

## 2021-10-04 ENCOUNTER — MED ADMIN CHARGE (OUTPATIENT)
Age: 56
End: 2021-10-04

## 2021-10-04 ENCOUNTER — APPOINTMENT (OUTPATIENT)
Dept: INTERNAL MEDICINE | Facility: CLINIC | Age: 56
End: 2021-10-04
Payer: MEDICAID

## 2021-10-04 VITALS
HEIGHT: 63 IN | HEART RATE: 88 BPM | OXYGEN SATURATION: 99 % | SYSTOLIC BLOOD PRESSURE: 130 MMHG | DIASTOLIC BLOOD PRESSURE: 80 MMHG

## 2021-10-04 VITALS
DIASTOLIC BLOOD PRESSURE: 80 MMHG | OXYGEN SATURATION: 97 % | BODY MASS INDEX: 31.89 KG/M2 | HEIGHT: 63 IN | WEIGHT: 180 LBS | SYSTOLIC BLOOD PRESSURE: 120 MMHG | HEART RATE: 87 BPM

## 2021-10-04 VITALS — TEMPERATURE: 97.6 F

## 2021-10-04 DIAGNOSIS — Z23 ENCOUNTER FOR IMMUNIZATION: ICD-10-CM

## 2021-10-04 DIAGNOSIS — M25.511 PAIN IN RIGHT SHOULDER: ICD-10-CM

## 2021-10-04 DIAGNOSIS — Z87.898 PERSONAL HISTORY OF OTHER SPECIFIED CONDITIONS: ICD-10-CM

## 2021-10-04 DIAGNOSIS — I50.30 UNSPECIFIED DIASTOLIC (CONGESTIVE) HEART FAILURE: ICD-10-CM

## 2021-10-04 DIAGNOSIS — I10 ESSENTIAL (PRIMARY) HYPERTENSION: ICD-10-CM

## 2021-10-04 DIAGNOSIS — Z98.89 OTHER SPECIFIED POSTPROCEDURAL STATES: Chronic | ICD-10-CM

## 2021-10-04 DIAGNOSIS — R22.1 LOCALIZED SWELLING, MASS AND LUMP, NECK: ICD-10-CM

## 2021-10-04 LAB
A1C WITH ESTIMATED AVERAGE GLUCOSE RESULT: 6.4 % — HIGH (ref 4–5.6)
ALBUMIN SERPL ELPH-MCNC: 4.5 G/DL — SIGNIFICANT CHANGE UP (ref 3.3–5)
ALP SERPL-CCNC: 82 U/L — SIGNIFICANT CHANGE UP (ref 40–120)
ALT FLD-CCNC: 17 U/L — SIGNIFICANT CHANGE UP (ref 4–33)
ANION GAP SERPL CALC-SCNC: 12 MMOL/L — SIGNIFICANT CHANGE UP (ref 7–14)
AST SERPL-CCNC: 26 U/L — SIGNIFICANT CHANGE UP (ref 4–32)
BILIRUB SERPL-MCNC: 0.5 MG/DL — SIGNIFICANT CHANGE UP (ref 0.2–1.2)
BUN SERPL-MCNC: 11 MG/DL — SIGNIFICANT CHANGE UP (ref 7–23)
CALCIUM SERPL-MCNC: 9.7 MG/DL — SIGNIFICANT CHANGE UP (ref 8.4–10.5)
CHLORIDE SERPL-SCNC: 102 MMOL/L — SIGNIFICANT CHANGE UP (ref 98–107)
CO2 SERPL-SCNC: 27 MMOL/L — SIGNIFICANT CHANGE UP (ref 22–31)
CREAT SERPL-MCNC: 0.7 MG/DL — SIGNIFICANT CHANGE UP (ref 0.5–1.3)
ESTIMATED AVERAGE GLUCOSE: 137 — SIGNIFICANT CHANGE UP
GLUCOSE SERPL-MCNC: 111 MG/DL — HIGH (ref 70–99)
MAGNESIUM SERPL-MCNC: 2.1 MG/DL — SIGNIFICANT CHANGE UP (ref 1.6–2.6)
POTASSIUM SERPL-MCNC: 3.7 MMOL/L — SIGNIFICANT CHANGE UP (ref 3.5–5.3)
POTASSIUM SERPL-SCNC: 3.7 MMOL/L — SIGNIFICANT CHANGE UP (ref 3.5–5.3)
PROT SERPL-MCNC: 8.6 G/DL — HIGH (ref 6–8.3)
SODIUM SERPL-SCNC: 141 MMOL/L — SIGNIFICANT CHANGE UP (ref 135–145)

## 2021-10-04 PROCEDURE — 99214 OFFICE O/P EST MOD 30 MIN: CPT | Mod: GC

## 2021-10-04 RX ORDER — HYDROCHLOROTHIAZIDE 12.5 MG/1
12.5 TABLET ORAL DAILY
Qty: 90 | Refills: 0 | Status: DISCONTINUED | COMMUNITY
Start: 2021-02-03 | End: 2021-10-04

## 2021-10-04 RX ORDER — CHROMIUM 200 MCG
25 MCG TABLET ORAL
Qty: 90 | Refills: 1 | Status: DISCONTINUED | COMMUNITY
Start: 2021-02-09 | End: 2021-10-04

## 2021-10-04 RX ORDER — PANTOPRAZOLE 40 MG/1
40 TABLET, DELAYED RELEASE ORAL DAILY
Qty: 90 | Refills: 0 | Status: DISCONTINUED | COMMUNITY
Start: 2021-02-03 | End: 2021-10-04

## 2021-10-04 RX ORDER — PANTOPRAZOLE 40 MG/1
40 TABLET, DELAYED RELEASE ORAL DAILY
Qty: 30 | Refills: 0 | Status: DISCONTINUED | COMMUNITY
Start: 2020-04-10 | End: 2021-10-04

## 2021-10-09 DIAGNOSIS — E87.6 HYPOKALEMIA: ICD-10-CM

## 2021-10-09 DIAGNOSIS — I10 ESSENTIAL (PRIMARY) HYPERTENSION: ICD-10-CM

## 2021-10-09 DIAGNOSIS — J45.909 UNSPECIFIED ASTHMA, UNCOMPLICATED: ICD-10-CM

## 2021-10-09 DIAGNOSIS — E66.9 OBESITY, UNSPECIFIED: ICD-10-CM

## 2021-10-09 DIAGNOSIS — E11.29 TYPE 2 DIABETES MELLITUS WITH OTHER DIABETIC KIDNEY COMPLICATION: ICD-10-CM

## 2021-10-09 DIAGNOSIS — K76.0 FATTY (CHANGE OF) LIVER, NOT ELSEWHERE CLASSIFIED: ICD-10-CM

## 2021-11-08 ENCOUNTER — NON-APPOINTMENT (OUTPATIENT)
Age: 56
End: 2021-11-08

## 2021-11-08 ENCOUNTER — APPOINTMENT (OUTPATIENT)
Dept: INTERNAL MEDICINE | Facility: CLINIC | Age: 56
End: 2021-11-08

## 2021-11-09 ENCOUNTER — NON-APPOINTMENT (OUTPATIENT)
Age: 56
End: 2021-11-09

## 2021-11-11 ENCOUNTER — APPOINTMENT (OUTPATIENT)
Dept: INTERNAL MEDICINE | Facility: CLINIC | Age: 56
End: 2021-11-11
Payer: MEDICAID

## 2021-11-11 ENCOUNTER — RESULT REVIEW (OUTPATIENT)
Age: 56
End: 2021-11-11

## 2021-11-11 ENCOUNTER — OUTPATIENT (OUTPATIENT)
Dept: OUTPATIENT SERVICES | Facility: HOSPITAL | Age: 56
LOS: 1 days | End: 2021-11-11

## 2021-11-11 VITALS
SYSTOLIC BLOOD PRESSURE: 142 MMHG | HEIGHT: 63 IN | WEIGHT: 180 LBS | DIASTOLIC BLOOD PRESSURE: 86 MMHG | BODY MASS INDEX: 31.89 KG/M2 | HEART RATE: 81 BPM | OXYGEN SATURATION: 98 %

## 2021-11-11 DIAGNOSIS — Z98.89 OTHER SPECIFIED POSTPROCEDURAL STATES: Chronic | ICD-10-CM

## 2021-11-11 LAB
APAP SERPL-MCNC: <5 UG/ML — LOW (ref 15–25)
BASOPHILS # BLD AUTO: 0.03 K/UL — SIGNIFICANT CHANGE UP (ref 0–0.2)
BASOPHILS NFR BLD AUTO: 0.4 % — SIGNIFICANT CHANGE UP (ref 0–2)
EOSINOPHIL # BLD AUTO: 0.05 K/UL — SIGNIFICANT CHANGE UP (ref 0–0.5)
EOSINOPHIL NFR BLD AUTO: 0.6 % — SIGNIFICANT CHANGE UP (ref 0–6)
ETHANOL SERPL-MCNC: <10 MG/DL — SIGNIFICANT CHANGE UP
HCT VFR BLD CALC: 37.9 % — SIGNIFICANT CHANGE UP (ref 34.5–45)
HGB BLD-MCNC: 12.6 G/DL — SIGNIFICANT CHANGE UP (ref 11.5–15.5)
IANC: 6.03 K/UL — SIGNIFICANT CHANGE UP (ref 1.5–8.5)
IMM GRANULOCYTES NFR BLD AUTO: 0.2 % — SIGNIFICANT CHANGE UP (ref 0–1.5)
LYMPHOCYTES # BLD AUTO: 1.57 K/UL — SIGNIFICANT CHANGE UP (ref 1–3.3)
LYMPHOCYTES # BLD AUTO: 19.6 % — SIGNIFICANT CHANGE UP (ref 13–44)
MCHC RBC-ENTMCNC: 29 PG — SIGNIFICANT CHANGE UP (ref 27–34)
MCHC RBC-ENTMCNC: 33.2 GM/DL — SIGNIFICANT CHANGE UP (ref 32–36)
MCV RBC AUTO: 87.3 FL — SIGNIFICANT CHANGE UP (ref 80–100)
MONOCYTES # BLD AUTO: 0.32 K/UL — SIGNIFICANT CHANGE UP (ref 0–0.9)
MONOCYTES NFR BLD AUTO: 4 % — SIGNIFICANT CHANGE UP (ref 2–14)
NEUTROPHILS # BLD AUTO: 6.03 K/UL — SIGNIFICANT CHANGE UP (ref 1.8–7.4)
NEUTROPHILS NFR BLD AUTO: 75.2 % — SIGNIFICANT CHANGE UP (ref 43–77)
NRBC # BLD: 0 /100 WBCS — SIGNIFICANT CHANGE UP
NRBC # FLD: 0 K/UL — SIGNIFICANT CHANGE UP
PLATELET # BLD AUTO: 333 K/UL — SIGNIFICANT CHANGE UP (ref 150–400)
RBC # BLD: 4.34 M/UL — SIGNIFICANT CHANGE UP (ref 3.8–5.2)
RBC # FLD: 12.3 % — SIGNIFICANT CHANGE UP (ref 10.3–14.5)
SALICYLATES SERPL-MCNC: <0.3 MG/DL — LOW (ref 15–30)
TOXICOLOGY SCREEN, DRUGS OF ABUSE, SERUM RESULT: SIGNIFICANT CHANGE UP
WBC # BLD: 8.02 K/UL — SIGNIFICANT CHANGE UP (ref 3.8–10.5)
WBC # FLD AUTO: 8.02 K/UL — SIGNIFICANT CHANGE UP (ref 3.8–10.5)

## 2021-11-11 PROCEDURE — 99214 OFFICE O/P EST MOD 30 MIN: CPT | Mod: GC

## 2021-11-12 ENCOUNTER — NON-APPOINTMENT (OUTPATIENT)
Age: 56
End: 2021-11-12

## 2021-11-12 LAB
C TRACH RRNA SPEC QL NAA+PROBE: SIGNIFICANT CHANGE UP
HBV CORE AB SER-ACNC: SIGNIFICANT CHANGE UP
HBV CORE IGM SER-ACNC: SIGNIFICANT CHANGE UP
HBV E AB SER-ACNC: SIGNIFICANT CHANGE UP
HBV E AG SER-ACNC: SIGNIFICANT CHANGE UP
HBV SURFACE AB SER-ACNC: <3 MIU/ML — LOW
HBV SURFACE AB SER-ACNC: SIGNIFICANT CHANGE UP
HBV SURFACE AG SER-ACNC: SIGNIFICANT CHANGE UP
N GONORRHOEA RRNA SPEC QL NAA+PROBE: SIGNIFICANT CHANGE UP
SPECIMEN SOURCE: SIGNIFICANT CHANGE UP
T PALLIDUM AB TITR SER: NEGATIVE — SIGNIFICANT CHANGE UP
VZV IGG FLD QL IA: 3822 INDEX — SIGNIFICANT CHANGE UP
VZV IGG FLD QL IA: POSITIVE — SIGNIFICANT CHANGE UP

## 2021-11-12 NOTE — END OF VISIT
[Time Spent: ___ minutes] : I have spent [unfilled] minutes of time on the encounter. 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

## 2021-11-12 NOTE — ASSESSMENT
[FreeTextEntry1] : #HCM\par Flu shot: Today in office\par BMI: 31.89, refusing nutrion referal at this time\par HIV: Negative 2017\par Hep C: Negative 2016\par COVID vaccination: received \par Coloscopy: Jan 2020\par Tdap 15 Feb 2018\par PNA 23 15 Feb 2018\par Mammo: September 2020\par Pap: Due Aug 2022\par \par

## 2021-11-12 NOTE — ADDENDUM
[FreeTextEntry1] : Case discussed with Dr. Hoffman \par Total time of encounter 30 minutes\par F/up with me in 5 weeks\par \par \par Vani Ding, PGY-1\par [employer]\par

## 2021-11-12 NOTE — END OF VISIT
[Time Spent: ___ minutes] : I have spent [unfilled] minutes of time on the encounter. [] : Resident [FreeTextEntry3] : Despite normal ALT and AST, pt with fatty liver on ultrasound. FIB-4 score 1.4. Could obtain fibroscan or refer to liver for consideration of clinical trials. Agree with BP med changes for K control.

## 2021-11-12 NOTE — PHYSICAL EXAM
[No Acute Distress] : no acute distress [Normal Sclera/Conjunctiva] : normal sclera/conjunctiva [Normal Outer Ear/Nose] : the outer ears and nose were normal in appearance [No JVD] : no jugular venous distention [No Respiratory Distress] : no respiratory distress  [No Accessory Muscle Use] : no accessory muscle use [Clear to Auscultation] : lungs were clear to auscultation bilaterally [Normal Rate] : normal rate  [Regular Rhythm] : with a regular rhythm [Normal S1, S2] : normal S1 and S2 [No Murmur] : no murmur heard [No Edema] : there was no peripheral edema [Soft] : abdomen soft [Non Tender] : non-tender [Non-distended] : non-distended [No HSM] : no HSM [Normal Affect] : the affect was normal [Normal Insight/Judgement] : insight and judgment were intact [de-identified] : Mild 1+ pitting and non pitting edema

## 2021-11-12 NOTE — HISTORY OF PRESENT ILLNESS
[FreeTextEntry1] : Presenting for work form to be filled out [de-identified] : 57 yo F w/ a PMHx of HTN, DM2 (A1C 6.4 10/4), hepatic steatosis, and stage I obesity who presents for a follow up appointment. Pt conveys that she has been doing well overall and denies any new acute issues. She stopped taking her Januvia in march of 2021. Recently she's been taking it every other day when she feels like its necessary. She still has some metformin and has been taking that once in a while too.\par \par With regards to her blood pressure, she has been taking her amlodipine and lisinopril everyday and does not miss any doses. She takes her blood pressure at night after work and it ranges between 135-140/80-90s. \par \par  The patient recently went to the ED because she was getting "chest flutters" every other week for a few minutes at a time. Her EKG there was wnl, and she now believes she is having chest flutters because she is stressed at home. Her daughter recently told her she was not interested in men, and that has been hard for her to accept, and she would like to talk to someone about this because she feels like she doesn’t not have friends she can be open with. 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 She needs home health forms filled out for this today. Fot these tests, she brought MMR titers and a negative quantiferon, however she needs hep b titers, varicella titers, GC, syphillis, and utox drawn.\par \par With regards to diet and exercise, she is too tired when she gets back from work to exercise. She eats at work and does not have much control over her diet, but would not like a nutritionist referral at this time. \par

## 2021-11-12 NOTE — PLAN
[FreeTextEntry1] : 57 yo F w/ a PMHx of HTN, DM2 (now pre-diabetes w/ A1C 5.7 11/22/20), HFpEF (stage 1), hepatic steatosis, stage I obesity, and chronic abd discomfort (?functional dyspepsia) who presents for a follow up telephone appointment.

## 2021-11-12 NOTE — HISTORY OF PRESENT ILLNESS
[FreeTextEntry1] : Follow up  [de-identified] : 55 yo F w/ a PMHx of HTN, DM2 (A1C 6.1 6/21), hepatic steatosis, and stage I obesity who presents for a follow up appointment. Pt conveys that she has been doing well overall and denies any new acute issues. She stopped taking her Januvia in march of 2021, she stopped taking her pantoprazole a few months back as well because she no longer feels dyspepsia. \par \par She has gained 10 pounds since February because she has not been exercising as much. 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 She has been trying to control her diet and would not like a nutrionist referral at this time.\par \par She does feel cramping in her legs and nausea once very few weeks over the past couple of months.She denies SOB or dyspnea on exertion. She states taking Klor-Con packets help her feel better, which she was prescribed in the past due to intermittent hypokalemia. She also gets  a cramp on her left side of her back which she states feels better with cranberry juice, however she reports no dysuria, hematuria, or incomplete emptying. \par \par Lastly, she also feels like she gets chest tightness in the winter and would like some medication for that.

## 2021-11-12 NOTE — ADDENDUM
[FreeTextEntry1] : Case discussed with Dr. Shaffer\par Total time of encounter 60 minutes\par RTC 3 months\par \par Vani Ding, PGY-1\par [employer]\par \par

## 2021-11-12 NOTE — ASSESSMENT
[FreeTextEntry1] : #HCM\par Flu shot: Oct 4, 2021\par BMI: 31.89, refusing nutrtion referral at this time\par HIV: Negative 2017\par Hep C: Negative 2016\par COVID vaccination: received \par Coloscopy: Jan 2020\par Tdap 15 Feb 2018\par PNA 23 15 Feb 2018\par Mammo: September 2020\par Pap: Due Aug 2022\par - f/up syphillis, GC, utox, hep b titers, varicella titers for paper work\par \par

## 2021-11-15 ENCOUNTER — RX RENEWAL (OUTPATIENT)
Age: 56
End: 2021-11-15

## 2021-11-15 ENCOUNTER — TRANSCRIPTION ENCOUNTER (OUTPATIENT)
Age: 56
End: 2021-11-15

## 2021-11-16 ENCOUNTER — APPOINTMENT (OUTPATIENT)
Dept: INTERNAL MEDICINE | Facility: CLINIC | Age: 56
End: 2021-11-16

## 2021-11-16 ENCOUNTER — OUTPATIENT (OUTPATIENT)
Dept: OUTPATIENT SERVICES | Facility: HOSPITAL | Age: 56
LOS: 1 days | End: 2021-11-16

## 2021-11-16 VITALS — TEMPERATURE: 97.3 F

## 2021-11-16 DIAGNOSIS — Z98.89 OTHER SPECIFIED POSTPROCEDURAL STATES: Chronic | ICD-10-CM

## 2021-11-24 DIAGNOSIS — J45.909 UNSPECIFIED ASTHMA, UNCOMPLICATED: ICD-10-CM

## 2021-11-24 DIAGNOSIS — E11.29 TYPE 2 DIABETES MELLITUS WITH OTHER DIABETIC KIDNEY COMPLICATION: ICD-10-CM

## 2021-11-24 DIAGNOSIS — E66.9 OBESITY, UNSPECIFIED: ICD-10-CM

## 2021-11-24 DIAGNOSIS — Z23 ENCOUNTER FOR IMMUNIZATION: ICD-10-CM

## 2021-11-24 DIAGNOSIS — I10 ESSENTIAL (PRIMARY) HYPERTENSION: ICD-10-CM

## 2021-11-24 DIAGNOSIS — F41.9 ANXIETY DISORDER, UNSPECIFIED: ICD-10-CM

## 2021-11-25 NOTE — H&P ADULT - PROBLEM SELECTOR PROBLEM 3
Problem: Knowledge Deficit - Standard  Goal: Patient and family/care givers will demonstrate understanding of plan of care, disease process/condition, diagnostic tests and medications  Outcome: Not Progressing  Note: Education provide regarding disease process and safety precautions and POC. Pt remains confused and agitated at times.     Problem: Pain - Standard  Goal: Alleviation of pain or a reduction in pain to the patient’s comfort goal  Outcome: Met  Flowsheets (Taken 11/24/2021 3096)  Hernandez-Mendiola Scale: 0   Note: Denies pain   The patient is Stable - Low risk of patient condition declining or worsening    Shift Goals  Clinical Goals: Pt safety, free from falls  Patient Goals: Rest  Family Goals: N/A    Progress made toward(s) clinical / shift goals:  Remains free from falls and injuries    Patient is not progressing towards the following goals:Pt remains impulsive      Problem: Knowledge Deficit - Standard  Goal: Patient and family/care givers will demonstrate understanding of plan of care, disease process/condition, diagnostic tests and medications  Outcome: Not Progressing  Note: Education provide regarding disease process and safety precautions and POC. Pt remains confused and agitated at times.      Essential hypertension

## 2021-11-29 ENCOUNTER — EMERGENCY (EMERGENCY)
Facility: HOSPITAL | Age: 56
LOS: 1 days | Discharge: HOME | End: 2021-11-29
Attending: EMERGENCY MEDICINE | Admitting: EMERGENCY MEDICINE
Payer: MEDICAID

## 2021-11-29 VITALS
RESPIRATION RATE: 18 BRPM | HEART RATE: 70 BPM | TEMPERATURE: 98 F | SYSTOLIC BLOOD PRESSURE: 132 MMHG | OXYGEN SATURATION: 100 % | DIASTOLIC BLOOD PRESSURE: 71 MMHG

## 2021-11-29 VITALS
DIASTOLIC BLOOD PRESSURE: 83 MMHG | HEIGHT: 63 IN | OXYGEN SATURATION: 99 % | HEART RATE: 72 BPM | TEMPERATURE: 99 F | RESPIRATION RATE: 16 BRPM | SYSTOLIC BLOOD PRESSURE: 154 MMHG

## 2021-11-29 DIAGNOSIS — Z98.89 OTHER SPECIFIED POSTPROCEDURAL STATES: Chronic | ICD-10-CM

## 2021-11-29 LAB
ALBUMIN SERPL ELPH-MCNC: 4 G/DL — SIGNIFICANT CHANGE UP (ref 3.3–5)
ALP SERPL-CCNC: 86 U/L — SIGNIFICANT CHANGE UP (ref 40–120)
ALT FLD-CCNC: 12 U/L — SIGNIFICANT CHANGE UP (ref 4–33)
ANION GAP SERPL CALC-SCNC: 9 MMOL/L — SIGNIFICANT CHANGE UP (ref 7–14)
AST SERPL-CCNC: 14 U/L — SIGNIFICANT CHANGE UP (ref 4–32)
BASOPHILS # BLD AUTO: 0.02 K/UL — SIGNIFICANT CHANGE UP (ref 0–0.2)
BASOPHILS NFR BLD AUTO: 0.2 % — SIGNIFICANT CHANGE UP (ref 0–2)
BILIRUB SERPL-MCNC: 0.5 MG/DL — SIGNIFICANT CHANGE UP (ref 0.2–1.2)
BUN SERPL-MCNC: 15 MG/DL — SIGNIFICANT CHANGE UP (ref 7–23)
CALCIUM SERPL-MCNC: 9.6 MG/DL — SIGNIFICANT CHANGE UP (ref 8.4–10.5)
CHLORIDE SERPL-SCNC: 106 MMOL/L — SIGNIFICANT CHANGE UP (ref 98–107)
CO2 SERPL-SCNC: 30 MMOL/L — SIGNIFICANT CHANGE UP (ref 22–31)
CREAT SERPL-MCNC: 0.95 MG/DL — SIGNIFICANT CHANGE UP (ref 0.5–1.3)
EOSINOPHIL # BLD AUTO: 0.18 K/UL — SIGNIFICANT CHANGE UP (ref 0–0.5)
EOSINOPHIL NFR BLD AUTO: 2.2 % — SIGNIFICANT CHANGE UP (ref 0–6)
GLUCOSE SERPL-MCNC: 110 MG/DL — HIGH (ref 70–99)
HCT VFR BLD CALC: 34.3 % — LOW (ref 34.5–45)
HGB BLD-MCNC: 11.2 G/DL — LOW (ref 11.5–15.5)
IANC: 4.91 K/UL — SIGNIFICANT CHANGE UP (ref 1.5–8.5)
IMM GRANULOCYTES NFR BLD AUTO: 0.2 % — SIGNIFICANT CHANGE UP (ref 0–1.5)
LYMPHOCYTES # BLD AUTO: 2.56 K/UL — SIGNIFICANT CHANGE UP (ref 1–3.3)
LYMPHOCYTES # BLD AUTO: 31.2 % — SIGNIFICANT CHANGE UP (ref 13–44)
MCHC RBC-ENTMCNC: 28.5 PG — SIGNIFICANT CHANGE UP (ref 27–34)
MCHC RBC-ENTMCNC: 32.7 GM/DL — SIGNIFICANT CHANGE UP (ref 32–36)
MCV RBC AUTO: 87.3 FL — SIGNIFICANT CHANGE UP (ref 80–100)
MONOCYTES # BLD AUTO: 0.51 K/UL — SIGNIFICANT CHANGE UP (ref 0–0.9)
MONOCYTES NFR BLD AUTO: 6.2 % — SIGNIFICANT CHANGE UP (ref 2–14)
NEUTROPHILS # BLD AUTO: 4.91 K/UL — SIGNIFICANT CHANGE UP (ref 1.8–7.4)
NEUTROPHILS NFR BLD AUTO: 60 % — SIGNIFICANT CHANGE UP (ref 43–77)
NRBC # BLD: 0 /100 WBCS — SIGNIFICANT CHANGE UP
NRBC # FLD: 0 K/UL — SIGNIFICANT CHANGE UP
PLATELET # BLD AUTO: 328 K/UL — SIGNIFICANT CHANGE UP (ref 150–400)
POTASSIUM SERPL-MCNC: 3.8 MMOL/L — SIGNIFICANT CHANGE UP (ref 3.5–5.3)
POTASSIUM SERPL-SCNC: 3.8 MMOL/L — SIGNIFICANT CHANGE UP (ref 3.5–5.3)
PROT SERPL-MCNC: 7.8 G/DL — SIGNIFICANT CHANGE UP (ref 6–8.3)
RBC # BLD: 3.93 M/UL — SIGNIFICANT CHANGE UP (ref 3.8–5.2)
RBC # FLD: 12.8 % — SIGNIFICANT CHANGE UP (ref 10.3–14.5)
SODIUM SERPL-SCNC: 145 MMOL/L — SIGNIFICANT CHANGE UP (ref 135–145)
TROPONIN T, HIGH SENSITIVITY RESULT: 10 NG/L — SIGNIFICANT CHANGE UP
TROPONIN T, HIGH SENSITIVITY RESULT: 11 NG/L — SIGNIFICANT CHANGE UP
WBC # BLD: 8.2 K/UL — SIGNIFICANT CHANGE UP (ref 3.8–10.5)
WBC # FLD AUTO: 8.2 K/UL — SIGNIFICANT CHANGE UP (ref 3.8–10.5)

## 2021-11-29 PROCEDURE — 71046 X-RAY EXAM CHEST 2 VIEWS: CPT | Mod: 26

## 2021-11-29 PROCEDURE — 93010 ELECTROCARDIOGRAM REPORT: CPT

## 2021-11-29 PROCEDURE — 99284 EMERGENCY DEPT VISIT MOD MDM: CPT | Mod: 25

## 2021-11-29 NOTE — ED ADULT TRIAGE NOTE - CHIEF COMPLAINT QUOTE
Pt presents to ED ambulatory from home with c/o chest pain and palpitations x several months. Pt reports hx of HTN, and asthma. Pt reports intermittent difficulty breathing with mild relief from albuterol inhaler.

## 2021-11-29 NOTE — ED PROVIDER NOTE - MDM ORDERS SUBMITTED SELECTION
Chief Complaint   Patient presents with   • Follow-up     Blood pressure - chest pain, just to the left of the sternum, sharp/stabbing pain does radiate down left arm   • Leg     Sore on right leg (shin area) -- nonhealing       HISTORY OF PRESENT ILLNESS:  47 year old White  male presents with 2 concerns. He states that for at least a year now he has been having intermittent pain on the left side of his chest anteriorly. Recently he had an episode that was fairly bad associated with using a chainsaw around his property. Sometimes it can happen all driving. He describes it as sharp and stabbing in nature. Distal left of his sternum. No reported diaphoresis.    He has a history of having had a CT calcium score of 82. He is concerned about heart disease. Currently on testosterone which he knows can raise his risk.    His other issue is that of some recurrent sores on the right anterior shin region. This started after a significant scrape sometime ago. Seems like it barely heals up and then trivial scraping or scratching all open them up again to cause them to bleed.      I have reviewed the patient's, past medical, surgical, social and family history, updating these as appropriate.  See Histories section of the electronic medical record for a display of this information.    Current Outpatient Prescriptions   Medication Sig Dispense Refill   • ciprofloxacin (CIPRO) 500 MG tablet Take 500 mg by mouth 2 times daily.     • testosterone (ANDROGEL) 20.25 MG/1.25GM (1.62%) gel Place onto the skin daily.     • Multiple Vitamins-Minerals (MULTIVITAMIN ADULT PO) Take by mouth daily.     • ibuprofen 200 MG capsule Take 200 mg by mouth every 6 hours as needed for Pain.     • loratadine (CLARITIN) 10 MG tablet Take 10 mg by mouth daily.       No current facility-administered medications for this visit.        Allergies as of 05/10/2018 - Reviewed 05/10/2018   Allergen Reaction Noted   • Penicillins Other (See Comments) 09/10/2015        History   Smoking Status   • Former Smoker   • Quit date: 12/29/1996   Smokeless Tobacco   • Never Used       OBJECTIVE:    Visit Vitals  /82 (BP Location: [facility], Patient Position: Sitting, Cuff Size: Large Adult)   Pulse 60   Resp 18   Ht 5' 11\" (1.803 m)   Wt 102.1 kg   BMI 31.38 kg/m²      Alert and oriented ×3. He is perfectly comfortable at present.  Heart: Regular rhythm and normal rate without rub or murmur. Normal rate.  Lungs: Clear to auscultation throughout.  Chest: Well muscled about the chest region. No deformity.  Extremities: Lung the right anterior lower leg are 2 scabbed lesions that appear to be healing reasonably well. They seem to be overlying a scar right on the pretibial surface.    ASSESSMENT:    1. Chest pain, unspecified type    Recurrent abrasion right anterior shin over scar     PLAN:  Keep the leg wrapped in Coban protected scratching and irritation for a month or 2. Further interventions would be potentially even skin graft or plastic surgery which I don't think it is indicated at this time.    Somewhat atypical chest pain complaints. It sounds more chest wall to me but I believe his stress test is indicated for more definitive evaluation.     Orders Placed This Encounter   • Stress test   • Echo Stress   • ciprofloxacin (CIPRO) 500 MG tablet                          Labs/EKG/Imaging Studies

## 2021-11-29 NOTE — ED PROVIDER NOTE - PROGRESS NOTE DETAILS
pt no distress no cp. no palpitations. pending repeat trop. pt was asleep  in ED. No events on cardiac monitor. HR 82regular. Discussed results w pt, offered CDU for tele monitor and echo in am, pt declined. Prefers to go home. Will follow up with her PMD. Advise cardiology follow up, recommend outpt holter for ongoing palpitations. Also advise to return to ED for any worsening of symptoms or if wish to continue treatment. will dc w copies of results

## 2021-11-29 NOTE — ED PROVIDER NOTE - NSFOLLOWUPINSTRUCTIONS_ED_ALL_ED_FT
HEADACHE/CHANGE IN LEVEL OF CONSCIOUSNESS
Follow up with own doctor in 1-2 days  Follow up with cardiology within one week  Return to ED for any worsening of symptoms especially any chest pain, shortness of breath or palpitations.

## 2021-11-29 NOTE — ED PROVIDER NOTE - PHYSICAL EXAMINATION
Dr Costello  mm. non icteric.  normal S1-S2 HR 67  No resp distress. able to speak in full and clear sentences. no wheeze, rales or stridor.  neck supple no goiter  soft nontender abdomen. no  rebound. no guarding. no sign of trauma. no CVAT  no pedal edema. no calf tenderness. normal pulses bilateral feet.

## 2021-11-29 NOTE — ED ADULT NURSE NOTE - OBJECTIVE STATEMENT
57 y/o F received to intake room 5 c/o intermittent palpations for months. Pt states this started after getting a cat. pt denies cp, sob, n/v. VS as noted, call bell in reach, comfort measures provided, 20G IV placed LAC, labs drawn and sent, will continue to monitor.

## 2021-11-30 LAB — SARS-COV-2 RNA SPEC QL NAA+PROBE: SIGNIFICANT CHANGE UP

## 2021-12-13 ENCOUNTER — APPOINTMENT (OUTPATIENT)
Dept: INTERNAL MEDICINE | Facility: CLINIC | Age: 56
End: 2021-12-13
Payer: MEDICAID

## 2021-12-13 ENCOUNTER — OUTPATIENT (OUTPATIENT)
Dept: OUTPATIENT SERVICES | Facility: HOSPITAL | Age: 56
LOS: 1 days | End: 2021-12-13

## 2021-12-13 VITALS
HEIGHT: 63 IN | OXYGEN SATURATION: 99 % | TEMPERATURE: 97.9 F | WEIGHT: 181.4 LBS | DIASTOLIC BLOOD PRESSURE: 80 MMHG | HEART RATE: 74 BPM | SYSTOLIC BLOOD PRESSURE: 138 MMHG | BODY MASS INDEX: 32.14 KG/M2

## 2021-12-13 DIAGNOSIS — Z98.89 OTHER SPECIFIED POSTPROCEDURAL STATES: Chronic | ICD-10-CM

## 2021-12-13 PROCEDURE — 99213 OFFICE O/P EST LOW 20 MIN: CPT | Mod: GE

## 2021-12-18 NOTE — HISTORY OF PRESENT ILLNESS
[Post-hospitalization from ___ Hospital] : Post-hospitalization from [unfilled] Hospital [Admitted on: ___] : The patient was admitted on [unfilled] [Discharged on ___] : discharged on [unfilled] [FreeTextEntry2] : 56F w/ PMH of HTN, DM2 (a1c 6.4), hepatic steatosis, stage 1 obesity presenting to the office after an ED visit for chest palpitations. Her symptoms wax and wane, and usually this occurs at night. Chest pain is generally non exertional or pleuritic. She has non exertional SOB associated with the chest palpitations. Her workup showed a mild anemia compared to baseline with an H/H of 11.2/34.3, her TSH was wnl, troponin were negative, her EKG at the time was wnl. She was comfortable in the ED and was discharged to follow up with her PCP. She came in today to follow up her ED visit. \par \par She reports palpitations every few days, sometimes associated with SOB and dizziness. She denies any loss of consciousness, and she tried her inhaler for the SOB and it did not help. She denies chest pain, visual changes, headaches, N/V/C/D, or increased sweats with episodes These episodes are non exertional and usually happen at night. She wanted to do further work up while in the hospital but left because she did want to be exposed to COVID. \par \par Additionally she has noticed a lesion on her forehead which has been there since birth but she recently noticed was growing. She has not noticed any pain or bleeding around the region. She has additional lesions over her abdomen and back which she is unsure if have had any size changes. She has never had these lesions evaluated. \par

## 2021-12-18 NOTE — ADDENDUM
[FreeTextEntry1] : Case discussed with Dr. Alvarado\par Total time of encounter 30 min\par \par \par Vani Ding, PGY-1\par [employer]\par

## 2021-12-18 NOTE — PHYSICAL EXAM
[No Acute Distress] : no acute distress [Well Nourished] : well nourished [Well Developed] : well developed [Well-Appearing] : well-appearing [Normal Sclera/Conjunctiva] : normal sclera/conjunctiva [PERRL] : pupils equal round and reactive to light [EOMI] : extraocular movements intact [Normal Outer Ear/Nose] : the outer ears and nose were normal in appearance [Normal Oropharynx] : the oropharynx was normal [No JVD] : no jugular venous distention [No Lymphadenopathy] : no lymphadenopathy [Supple] : supple [Thyroid Normal, No Nodules] : the thyroid was normal and there were no nodules present [No Respiratory Distress] : no respiratory distress  [No Accessory Muscle Use] : no accessory muscle use [Clear to Auscultation] : lungs were clear to auscultation bilaterally [Normal Rate] : normal rate  [Regular Rhythm] : with a regular rhythm [Normal S1, S2] : normal S1 and S2 [No Murmur] : no murmur heard [No Carotid Bruits] : no carotid bruits [No Abdominal Bruit] : a ~M bruit was not heard ~T in the abdomen [No Varicosities] : no varicosities [Pedal Pulses Present] : the pedal pulses are present [No Palpable Aorta] : no palpable aorta [No Extremity Clubbing/Cyanosis] : no extremity clubbing/cyanosis [Soft] : abdomen soft [Non Tender] : non-tender [Non-distended] : non-distended [No Masses] : no abdominal mass palpated [No HSM] : no HSM [Normal Bowel Sounds] : normal bowel sounds [Normal Posterior Cervical Nodes] : no posterior cervical lymphadenopathy [Normal Anterior Cervical Nodes] : no anterior cervical lymphadenopathy [No CVA Tenderness] : no CVA  tenderness [No Spinal Tenderness] : no spinal tenderness [No Joint Swelling] : no joint swelling [Grossly Normal Strength/Tone] : grossly normal strength/tone [Coordination Grossly Intact] : coordination grossly intact [No Focal Deficits] : no focal deficits [Normal Gait] : normal gait [Deep Tendon Reflexes (DTR)] : deep tendon reflexes were 2+ and symmetric [Normal Affect] : the affect was normal [Normal Insight/Judgement] : insight and judgment were intact [de-identified] : 2+ peripheral edema [de-identified] : raised 2cm skin wart resembling seborrhic keratosis on right forehead, nontender to palpation. Multiple similar lesions over abdomen and flanks

## 2021-12-18 NOTE — ASSESSMENT
[FreeTextEntry1] : #HCM\par Flu shot: 10/4/21\par BMI: 32\par HIV: NEG 2017\par Hep C: Neg 2016\par COVID vaccination: received\par Colonoscopy: Jan 2020\par Tdap: 2/15/18\par PNA 13/23: 2/15/18\par Mammo: 9/2020\par Pap: due aug 2020\par \par HCM

## 2021-12-20 DIAGNOSIS — K76.0 FATTY (CHANGE OF) LIVER, NOT ELSEWHERE CLASSIFIED: ICD-10-CM

## 2021-12-20 DIAGNOSIS — L98.9 DISORDER OF THE SKIN AND SUBCUTANEOUS TISSUE, UNSPECIFIED: ICD-10-CM

## 2021-12-20 DIAGNOSIS — R00.2 PALPITATIONS: ICD-10-CM

## 2021-12-22 ENCOUNTER — NON-APPOINTMENT (OUTPATIENT)
Age: 56
End: 2021-12-22

## 2021-12-23 ENCOUNTER — RX RENEWAL (OUTPATIENT)
Age: 56
End: 2021-12-23

## 2021-12-27 ENCOUNTER — RX RENEWAL (OUTPATIENT)
Age: 56
End: 2021-12-27

## 2021-12-31 NOTE — HISTORY OF PRESENT ILLNESS
[FreeTextEntry1] : This is the first outpatient cardiology visit for Ms. Herrera, a 57 yo female.\par \par H/o HTN, DM, obesity, hepatic steatosis. ETT/MPI in 2020, normal exercise tolerance; 8 METS. Hypertensive response to exercise (up to 196/80 mmHg); normal nuclear myocardial perfusion imaging with normal LVEF.\par \par The patient was briefly hospitalized on 11/29/21 for palpitations and chest pain; wax / wane; non exertional discomfort. Workup showed a mild anemia compared to baseline with an H/H of 11.2/34.3, TSH wnl, troponin negative, EKG wnl. She was comfortable in the ED and was discharged. She saw her PCP a few weeks ago: reported palpitations every few days, sometimes associated with SOB and dizziness. She denied chest pain.\par \par

## 2022-01-01 ENCOUNTER — INPATIENT (INPATIENT)
Facility: HOSPITAL | Age: 57
LOS: 2 days | Discharge: HOME | End: 2022-01-04
Attending: STUDENT IN AN ORGANIZED HEALTH CARE EDUCATION/TRAINING PROGRAM | Admitting: STUDENT IN AN ORGANIZED HEALTH CARE EDUCATION/TRAINING PROGRAM
Payer: MEDICAID

## 2022-01-01 ENCOUNTER — TRANSCRIPTION ENCOUNTER (OUTPATIENT)
Age: 57
End: 2022-01-01

## 2022-01-01 VITALS
HEART RATE: 69 BPM | OXYGEN SATURATION: 99 % | WEIGHT: 179.9 LBS | SYSTOLIC BLOOD PRESSURE: 156 MMHG | RESPIRATION RATE: 20 BRPM | TEMPERATURE: 99 F | DIASTOLIC BLOOD PRESSURE: 88 MMHG | HEIGHT: 63 IN

## 2022-01-01 DIAGNOSIS — Z98.89 OTHER SPECIFIED POSTPROCEDURAL STATES: Chronic | ICD-10-CM

## 2022-01-01 LAB
ALBUMIN SERPL ELPH-MCNC: 3.4 G/DL — SIGNIFICANT CHANGE UP (ref 3.3–5)
ALP SERPL-CCNC: 97 U/L — SIGNIFICANT CHANGE UP (ref 40–120)
ALT FLD-CCNC: 19 U/L — SIGNIFICANT CHANGE UP (ref 12–78)
ANION GAP SERPL CALC-SCNC: 6 MMOL/L — SIGNIFICANT CHANGE UP (ref 5–17)
APPEARANCE UR: ABNORMAL
AST SERPL-CCNC: 18 U/L — SIGNIFICANT CHANGE UP (ref 15–37)
BASOPHILS # BLD AUTO: 0.02 K/UL — SIGNIFICANT CHANGE UP (ref 0–0.2)
BASOPHILS NFR BLD AUTO: 0.2 % — SIGNIFICANT CHANGE UP (ref 0–2)
BILIRUB SERPL-MCNC: 0.8 MG/DL — SIGNIFICANT CHANGE UP (ref 0.2–1.2)
BILIRUB UR-MCNC: NEGATIVE — SIGNIFICANT CHANGE UP
BUN SERPL-MCNC: 9 MG/DL — SIGNIFICANT CHANGE UP (ref 7–23)
CALCIUM SERPL-MCNC: 9.2 MG/DL — SIGNIFICANT CHANGE UP (ref 8.5–10.1)
CHLORIDE SERPL-SCNC: 104 MMOL/L — SIGNIFICANT CHANGE UP (ref 96–108)
CO2 SERPL-SCNC: 30 MMOL/L — SIGNIFICANT CHANGE UP (ref 22–31)
COLOR SPEC: YELLOW — SIGNIFICANT CHANGE UP
CREAT SERPL-MCNC: 0.77 MG/DL — SIGNIFICANT CHANGE UP (ref 0.5–1.3)
DIFF PNL FLD: NEGATIVE — SIGNIFICANT CHANGE UP
EOSINOPHIL # BLD AUTO: 0.08 K/UL — SIGNIFICANT CHANGE UP (ref 0–0.5)
EOSINOPHIL NFR BLD AUTO: 0.7 % — SIGNIFICANT CHANGE UP (ref 0–6)
FLUAV AG NPH QL: SIGNIFICANT CHANGE UP
FLUBV AG NPH QL: SIGNIFICANT CHANGE UP
GLUCOSE SERPL-MCNC: 142 MG/DL — HIGH (ref 70–99)
GLUCOSE UR QL: NEGATIVE MG/DL — SIGNIFICANT CHANGE UP
HCG SERPL-ACNC: 1 MIU/ML — SIGNIFICANT CHANGE UP
HCT VFR BLD CALC: 36.8 % — SIGNIFICANT CHANGE UP (ref 34.5–45)
HGB BLD-MCNC: 12.1 G/DL — SIGNIFICANT CHANGE UP (ref 11.5–15.5)
IMM GRANULOCYTES NFR BLD AUTO: 0.3 % — SIGNIFICANT CHANGE UP (ref 0–1.5)
KETONES UR-MCNC: NEGATIVE — SIGNIFICANT CHANGE UP
LACTATE SERPL-SCNC: 1 MMOL/L — SIGNIFICANT CHANGE UP (ref 0.7–2)
LEUKOCYTE ESTERASE UR-ACNC: NEGATIVE — SIGNIFICANT CHANGE UP
LIDOCAIN IGE QN: 97 U/L — SIGNIFICANT CHANGE UP (ref 73–393)
LYMPHOCYTES # BLD AUTO: 1.73 K/UL — SIGNIFICANT CHANGE UP (ref 1–3.3)
LYMPHOCYTES # BLD AUTO: 14.5 % — SIGNIFICANT CHANGE UP (ref 13–44)
MCHC RBC-ENTMCNC: 28.2 PG — SIGNIFICANT CHANGE UP (ref 27–34)
MCHC RBC-ENTMCNC: 32.9 GM/DL — SIGNIFICANT CHANGE UP (ref 32–36)
MCV RBC AUTO: 85.8 FL — SIGNIFICANT CHANGE UP (ref 80–100)
MONOCYTES # BLD AUTO: 0.51 K/UL — SIGNIFICANT CHANGE UP (ref 0–0.9)
MONOCYTES NFR BLD AUTO: 4.3 % — SIGNIFICANT CHANGE UP (ref 2–14)
NEUTROPHILS # BLD AUTO: 9.56 K/UL — HIGH (ref 1.8–7.4)
NEUTROPHILS NFR BLD AUTO: 80 % — HIGH (ref 43–77)
NITRITE UR-MCNC: NEGATIVE — SIGNIFICANT CHANGE UP
NRBC # BLD: 0 /100 WBCS — SIGNIFICANT CHANGE UP (ref 0–0)
PH UR: 7 — SIGNIFICANT CHANGE UP (ref 5–8)
PLATELET # BLD AUTO: 337 K/UL — SIGNIFICANT CHANGE UP (ref 150–400)
POTASSIUM SERPL-MCNC: 3.4 MMOL/L — LOW (ref 3.5–5.3)
POTASSIUM SERPL-SCNC: 3.4 MMOL/L — LOW (ref 3.5–5.3)
PROT SERPL-MCNC: 8.5 GM/DL — HIGH (ref 6–8.3)
PROT UR-MCNC: 100 MG/DL
RBC # BLD: 4.29 M/UL — SIGNIFICANT CHANGE UP (ref 3.8–5.2)
RBC # FLD: 12.5 % — SIGNIFICANT CHANGE UP (ref 10.3–14.5)
SARS-COV-2 RNA SPEC QL NAA+PROBE: SIGNIFICANT CHANGE UP
SODIUM SERPL-SCNC: 140 MMOL/L — SIGNIFICANT CHANGE UP (ref 135–145)
SP GR SPEC: 1.01 — SIGNIFICANT CHANGE UP (ref 1.01–1.02)
TROPONIN I, HIGH SENSITIVITY RESULT: 50.5 NG/L — SIGNIFICANT CHANGE UP
UROBILINOGEN FLD QL: NEGATIVE MG/DL — SIGNIFICANT CHANGE UP
WBC # BLD: 11.93 K/UL — HIGH (ref 3.8–10.5)
WBC # FLD AUTO: 11.93 K/UL — HIGH (ref 3.8–10.5)

## 2022-01-01 PROCEDURE — 99285 EMERGENCY DEPT VISIT HI MDM: CPT

## 2022-01-01 RX ORDER — ONDANSETRON 8 MG/1
4 TABLET, FILM COATED ORAL ONCE
Refills: 0 | Status: COMPLETED | OUTPATIENT
Start: 2022-01-01 | End: 2022-01-01

## 2022-01-01 RX ORDER — MORPHINE SULFATE 50 MG/1
4 CAPSULE, EXTENDED RELEASE ORAL ONCE
Refills: 0 | Status: DISCONTINUED | OUTPATIENT
Start: 2022-01-01 | End: 2022-01-01

## 2022-01-01 RX ORDER — SODIUM CHLORIDE 9 MG/ML
1000 INJECTION INTRAMUSCULAR; INTRAVENOUS; SUBCUTANEOUS ONCE
Refills: 0 | Status: COMPLETED | OUTPATIENT
Start: 2022-01-01 | End: 2022-01-01

## 2022-01-01 RX ADMIN — MORPHINE SULFATE 4 MILLIGRAM(S): 50 CAPSULE, EXTENDED RELEASE ORAL at 22:23

## 2022-01-01 RX ADMIN — ONDANSETRON 4 MILLIGRAM(S): 8 TABLET, FILM COATED ORAL at 22:22

## 2022-01-01 RX ADMIN — SODIUM CHLORIDE 1000 MILLILITER(S): 9 INJECTION INTRAMUSCULAR; INTRAVENOUS; SUBCUTANEOUS at 22:22

## 2022-01-01 NOTE — ED PROVIDER NOTE - PHYSICAL EXAMINATION
VITALS: reviewed  GEN: NAD, A & O x 4  HEAD/EYES: NCAT, PERRL, EOMI, anicteric sclerae, no conjunctival pallor  ENT: mucus membranes moist, oropharynx WNL, trachea midline, no JVD  RESP: lungs CTA with equal breath sounds bilaterally, chest wall nontender and atraumatic  CV: heart with reg rhythm S1, S2, no murmur; distal pulses intact and symmetric bilaterally  ABDOMEN: normoactive bowel sounds, soft, nondistended, tenderness on exam + Anaya's and epigastric ttp with guarding, no palpable masses  : no CVAT  MSK: extremities atraumatic and nontender, no edema, no asymmetry. the back is without midline or lateral tenderness, there is no spinal deformity or stepoff and the back is ranged painlessly. the neck has no midline tenderness, deformity, or stepoff, and is ranged painlessly.  SKIN: warm, dry, no rash, no bruising, no cyanosis. color appropriate for ethnicity  NEURO: alert, mentating appropriately, no facial asymmetry. gross sensation, motor, coordination are intact  PSYCH: Affect appropriate VITALS: reviewed  GEN: NAD, A & O x 4  HEAD/EYES: NCAT, PERRL, EOMI, anicteric sclerae,   ENT: mucus membranes moist, oropharynx WNL, trachea midline,   RESP: lungs CTA with equal breath sounds bilaterally, chest wall nontender and atraumatic  CV: heart with reg rhythm S1, S2, distal pulses intact and symmetric bilaterally  ABDOMEN: normoactive bowel sounds, soft, nondistended, tenderness on exam + Anaya's and epigastric ttp with guarding, no palpable masses  : no CVAT  MSK: extremities atraumatic and nontender, no edema, no asymmetry.  SKIN: warm, dry, no rash, no bruising, no cyanosis. color appropriate for ethnicity  NEURO: alert, mentating appropriately, no facial asymmetry. gross sensation, motor, coordination are intact  PSYCH: Affect appropriate

## 2022-01-01 NOTE — ED PROVIDER NOTE - OBJECTIVE STATEMENT
Pt is a 56 year old female w/PMH of prediabetes, HTN, who presents to the ED today for abdominal pain x 2 days. States pain was worsened today and had nonbloody/nonbilious vomit x 1. No black bloody stools. States she had a burning radiation going up to the chest.

## 2022-01-01 NOTE — ED ADULT NURSE NOTE - OBJECTIVE STATEMENT
Pt is a 56 year old female w/PMH of prediabetes, HTN, who presents to the ED today for abdominal pain x 2 days. States pain was worsened today and had nonbloody/nonbilious vomit x 1. No black bloody stools. States she had a burning radiation going up to the chest

## 2022-01-01 NOTE — ED PROVIDER NOTE - CLINICAL SUMMARY MEDICAL DECISION MAKING FREE TEXT BOX
DDx: Gallstones, pancreatitis, cholecystitis, vs other intraabdominal pathology.  Plan: Labs, CT, analgesia, reassess.

## 2022-01-02 ENCOUNTER — RESULT REVIEW (OUTPATIENT)
Age: 57
End: 2022-01-02

## 2022-01-02 ENCOUNTER — TRANSCRIPTION ENCOUNTER (OUTPATIENT)
Age: 57
End: 2022-01-02

## 2022-01-02 LAB
BACTERIA # UR AUTO: ABNORMAL
BASOPHILS # BLD AUTO: 0.05 K/UL — SIGNIFICANT CHANGE UP (ref 0–0.2)
BASOPHILS NFR BLD AUTO: 0.2 % — SIGNIFICANT CHANGE UP (ref 0–2)
COMMENT - URINE: SIGNIFICANT CHANGE UP
EOSINOPHIL # BLD AUTO: 0.07 K/UL — SIGNIFICANT CHANGE UP (ref 0–0.5)
EOSINOPHIL NFR BLD AUTO: 0.3 % — SIGNIFICANT CHANGE UP (ref 0–6)
EPI CELLS # UR: SIGNIFICANT CHANGE UP
HCT VFR BLD CALC: 34.2 % — LOW (ref 34.5–45)
HGB BLD-MCNC: 11.1 G/DL — LOW (ref 11.5–15.5)
IMM GRANULOCYTES NFR BLD AUTO: 0.7 % — SIGNIFICANT CHANGE UP (ref 0–1.5)
LYMPHOCYTES # BLD AUTO: 17.8 % — SIGNIFICANT CHANGE UP (ref 13–44)
LYMPHOCYTES # BLD AUTO: 3.76 K/UL — HIGH (ref 1–3.3)
MAGNESIUM SERPL-MCNC: 2.2 MG/DL — SIGNIFICANT CHANGE UP (ref 1.6–2.6)
MCHC RBC-ENTMCNC: 28.2 PG — SIGNIFICANT CHANGE UP (ref 27–34)
MCHC RBC-ENTMCNC: 32.5 GM/DL — SIGNIFICANT CHANGE UP (ref 32–36)
MCV RBC AUTO: 86.8 FL — SIGNIFICANT CHANGE UP (ref 80–100)
MONOCYTES # BLD AUTO: 0.56 K/UL — SIGNIFICANT CHANGE UP (ref 0–0.9)
MONOCYTES NFR BLD AUTO: 2.7 % — SIGNIFICANT CHANGE UP (ref 2–14)
NEUTROPHILS # BLD AUTO: 16.54 K/UL — HIGH (ref 1.8–7.4)
NEUTROPHILS NFR BLD AUTO: 78.3 % — HIGH (ref 43–77)
NRBC # BLD: 0 /100 WBCS — SIGNIFICANT CHANGE UP (ref 0–0)
PHOSPHATE SERPL-MCNC: 3.3 MG/DL — SIGNIFICANT CHANGE UP (ref 2.5–4.5)
PLATELET # BLD AUTO: 356 K/UL — SIGNIFICANT CHANGE UP (ref 150–400)
POTASSIUM SERPL-MCNC: 3.7 MMOL/L — SIGNIFICANT CHANGE UP (ref 3.5–5.3)
POTASSIUM SERPL-SCNC: 3.7 MMOL/L — SIGNIFICANT CHANGE UP (ref 3.5–5.3)
RBC # BLD: 3.94 M/UL — SIGNIFICANT CHANGE UP (ref 3.8–5.2)
RBC # FLD: 12.6 % — SIGNIFICANT CHANGE UP (ref 10.3–14.5)
RBC CASTS # UR COMP ASSIST: NEGATIVE /HPF — SIGNIFICANT CHANGE UP (ref 0–4)
WBC # BLD: 21.12 K/UL — HIGH (ref 3.8–10.5)
WBC # FLD AUTO: 21.12 K/UL — HIGH (ref 3.8–10.5)
WBC UR QL: SIGNIFICANT CHANGE UP

## 2022-01-02 PROCEDURE — 47562 LAPAROSCOPIC CHOLECYSTECTOMY: CPT

## 2022-01-02 PROCEDURE — 99222 1ST HOSP IP/OBS MODERATE 55: CPT | Mod: 57

## 2022-01-02 PROCEDURE — 76700 US EXAM ABDOM COMPLETE: CPT | Mod: 26

## 2022-01-02 PROCEDURE — 74177 CT ABD & PELVIS W/CONTRAST: CPT | Mod: 26,MA

## 2022-01-02 PROCEDURE — 88304 TISSUE EXAM BY PATHOLOGIST: CPT | Mod: 26

## 2022-01-02 PROCEDURE — 47562 LAPAROSCOPIC CHOLECYSTECTOMY: CPT | Mod: AS

## 2022-01-02 RX ORDER — DEXTROSE 50 % IN WATER 50 %
15 SYRINGE (ML) INTRAVENOUS ONCE
Refills: 0 | Status: DISCONTINUED | OUTPATIENT
Start: 2022-01-02 | End: 2022-01-04

## 2022-01-02 RX ORDER — ACETAMINOPHEN 500 MG
1000 TABLET ORAL EVERY 6 HOURS
Refills: 0 | Status: DISCONTINUED | OUTPATIENT
Start: 2022-01-02 | End: 2022-01-04

## 2022-01-02 RX ORDER — GLUCAGON INJECTION, SOLUTION 0.5 MG/.1ML
1 INJECTION, SOLUTION SUBCUTANEOUS ONCE
Refills: 0 | Status: DISCONTINUED | OUTPATIENT
Start: 2022-01-02 | End: 2022-01-02

## 2022-01-02 RX ORDER — ONDANSETRON 8 MG/1
4 TABLET, FILM COATED ORAL ONCE
Refills: 0 | Status: COMPLETED | OUTPATIENT
Start: 2022-01-02 | End: 2022-01-02

## 2022-01-02 RX ORDER — HYDROMORPHONE HYDROCHLORIDE 2 MG/ML
0.25 INJECTION INTRAMUSCULAR; INTRAVENOUS; SUBCUTANEOUS
Refills: 0 | Status: DISCONTINUED | OUTPATIENT
Start: 2022-01-02 | End: 2022-01-02

## 2022-01-02 RX ORDER — HYDROMORPHONE HYDROCHLORIDE 2 MG/ML
0.5 INJECTION INTRAMUSCULAR; INTRAVENOUS; SUBCUTANEOUS
Refills: 0 | Status: DISCONTINUED | OUTPATIENT
Start: 2022-01-02 | End: 2022-01-02

## 2022-01-02 RX ORDER — MORPHINE SULFATE 50 MG/1
2 CAPSULE, EXTENDED RELEASE ORAL EVERY 4 HOURS
Refills: 0 | Status: DISCONTINUED | OUTPATIENT
Start: 2022-01-02 | End: 2022-01-02

## 2022-01-02 RX ORDER — SODIUM CHLORIDE 9 MG/ML
1000 INJECTION, SOLUTION INTRAVENOUS
Refills: 0 | Status: DISCONTINUED | OUTPATIENT
Start: 2022-01-02 | End: 2022-01-02

## 2022-01-02 RX ORDER — ACETAMINOPHEN 500 MG
1000 TABLET ORAL ONCE
Refills: 0 | Status: COMPLETED | OUTPATIENT
Start: 2022-01-02 | End: 2022-01-02

## 2022-01-02 RX ORDER — DEXTROSE 50 % IN WATER 50 %
12.5 SYRINGE (ML) INTRAVENOUS ONCE
Refills: 0 | Status: DISCONTINUED | OUTPATIENT
Start: 2022-01-02 | End: 2022-01-02

## 2022-01-02 RX ORDER — AMLODIPINE BESYLATE 2.5 MG/1
10 TABLET ORAL DAILY
Refills: 0 | Status: DISCONTINUED | OUTPATIENT
Start: 2022-01-02 | End: 2022-01-04

## 2022-01-02 RX ORDER — SODIUM CHLORIDE 9 MG/ML
1000 INJECTION, SOLUTION INTRAVENOUS
Refills: 0 | Status: DISCONTINUED | OUTPATIENT
Start: 2022-01-02 | End: 2022-01-04

## 2022-01-02 RX ORDER — INSULIN LISPRO 100/ML
VIAL (ML) SUBCUTANEOUS EVERY 6 HOURS
Refills: 0 | Status: DISCONTINUED | OUTPATIENT
Start: 2022-01-02 | End: 2022-01-02

## 2022-01-02 RX ORDER — GLUCAGON INJECTION, SOLUTION 0.5 MG/.1ML
1 INJECTION, SOLUTION SUBCUTANEOUS ONCE
Refills: 0 | Status: DISCONTINUED | OUTPATIENT
Start: 2022-01-02 | End: 2022-01-04

## 2022-01-02 RX ORDER — SODIUM CHLORIDE 9 MG/ML
1000 INJECTION, SOLUTION INTRAVENOUS ONCE
Refills: 0 | Status: COMPLETED | OUTPATIENT
Start: 2022-01-02 | End: 2022-01-02

## 2022-01-02 RX ORDER — PANTOPRAZOLE SODIUM 20 MG/1
40 TABLET, DELAYED RELEASE ORAL
Refills: 0 | Status: DISCONTINUED | OUTPATIENT
Start: 2022-01-02 | End: 2022-01-04

## 2022-01-02 RX ORDER — DEXTROSE 50 % IN WATER 50 %
25 SYRINGE (ML) INTRAVENOUS ONCE
Refills: 0 | Status: DISCONTINUED | OUTPATIENT
Start: 2022-01-02 | End: 2022-01-02

## 2022-01-02 RX ORDER — PIPERACILLIN AND TAZOBACTAM 4; .5 G/20ML; G/20ML
3.38 INJECTION, POWDER, LYOPHILIZED, FOR SOLUTION INTRAVENOUS EVERY 8 HOURS
Refills: 0 | Status: COMPLETED | OUTPATIENT
Start: 2022-01-02 | End: 2022-01-03

## 2022-01-02 RX ORDER — INSULIN LISPRO 100/ML
VIAL (ML) SUBCUTANEOUS
Refills: 0 | Status: DISCONTINUED | OUTPATIENT
Start: 2022-01-02 | End: 2022-01-04

## 2022-01-02 RX ORDER — OXYCODONE HYDROCHLORIDE 5 MG/1
5 TABLET ORAL EVERY 4 HOURS
Refills: 0 | Status: DISCONTINUED | OUTPATIENT
Start: 2022-01-02 | End: 2022-01-04

## 2022-01-02 RX ORDER — HEPARIN SODIUM 5000 [USP'U]/ML
5000 INJECTION INTRAVENOUS; SUBCUTANEOUS EVERY 8 HOURS
Refills: 0 | Status: DISCONTINUED | OUTPATIENT
Start: 2022-01-02 | End: 2022-01-02

## 2022-01-02 RX ORDER — FAMOTIDINE 10 MG/ML
20 INJECTION INTRAVENOUS ONCE
Refills: 0 | Status: COMPLETED | OUTPATIENT
Start: 2022-01-02 | End: 2022-01-02

## 2022-01-02 RX ORDER — OXYCODONE HYDROCHLORIDE 5 MG/1
10 TABLET ORAL EVERY 4 HOURS
Refills: 0 | Status: DISCONTINUED | OUTPATIENT
Start: 2022-01-02 | End: 2022-01-04

## 2022-01-02 RX ORDER — POTASSIUM CHLORIDE 20 MEQ
10 PACKET (EA) ORAL
Refills: 0 | Status: COMPLETED | OUTPATIENT
Start: 2022-01-02 | End: 2022-01-02

## 2022-01-02 RX ORDER — ONDANSETRON 8 MG/1
4 TABLET, FILM COATED ORAL EVERY 6 HOURS
Refills: 0 | Status: DISCONTINUED | OUTPATIENT
Start: 2022-01-02 | End: 2022-01-02

## 2022-01-02 RX ORDER — PIPERACILLIN AND TAZOBACTAM 4; .5 G/20ML; G/20ML
3.38 INJECTION, POWDER, LYOPHILIZED, FOR SOLUTION INTRAVENOUS EVERY 8 HOURS
Refills: 0 | Status: DISCONTINUED | OUTPATIENT
Start: 2022-01-02 | End: 2022-01-02

## 2022-01-02 RX ORDER — HEPARIN SODIUM 5000 [USP'U]/ML
5000 INJECTION INTRAVENOUS; SUBCUTANEOUS EVERY 12 HOURS
Refills: 0 | Status: DISCONTINUED | OUTPATIENT
Start: 2022-01-02 | End: 2022-01-04

## 2022-01-02 RX ORDER — DEXTROSE 50 % IN WATER 50 %
12.5 SYRINGE (ML) INTRAVENOUS ONCE
Refills: 0 | Status: DISCONTINUED | OUTPATIENT
Start: 2022-01-02 | End: 2022-01-04

## 2022-01-02 RX ORDER — ONDANSETRON 8 MG/1
4 TABLET, FILM COATED ORAL EVERY 6 HOURS
Refills: 0 | Status: DISCONTINUED | OUTPATIENT
Start: 2022-01-02 | End: 2022-01-04

## 2022-01-02 RX ORDER — SODIUM CHLORIDE 9 MG/ML
1000 INJECTION, SOLUTION INTRAVENOUS
Refills: 0 | Status: DISCONTINUED | OUTPATIENT
Start: 2022-01-02 | End: 2022-01-03

## 2022-01-02 RX ORDER — DEXTROSE 50 % IN WATER 50 %
25 SYRINGE (ML) INTRAVENOUS ONCE
Refills: 0 | Status: DISCONTINUED | OUTPATIENT
Start: 2022-01-02 | End: 2022-01-04

## 2022-01-02 RX ORDER — DEXTROSE 50 % IN WATER 50 %
15 SYRINGE (ML) INTRAVENOUS ONCE
Refills: 0 | Status: DISCONTINUED | OUTPATIENT
Start: 2022-01-02 | End: 2022-01-02

## 2022-01-02 RX ORDER — PIPERACILLIN AND TAZOBACTAM 4; .5 G/20ML; G/20ML
3.38 INJECTION, POWDER, LYOPHILIZED, FOR SOLUTION INTRAVENOUS ONCE
Refills: 0 | Status: COMPLETED | OUTPATIENT
Start: 2022-01-02 | End: 2022-01-02

## 2022-01-02 RX ADMIN — Medication 1000 MILLIGRAM(S): at 04:44

## 2022-01-02 RX ADMIN — SODIUM CHLORIDE 125 MILLILITER(S): 9 INJECTION, SOLUTION INTRAVENOUS at 18:37

## 2022-01-02 RX ADMIN — Medication 1: at 16:08

## 2022-01-02 RX ADMIN — ONDANSETRON 4 MILLIGRAM(S): 8 TABLET, FILM COATED ORAL at 17:23

## 2022-01-02 RX ADMIN — Medication 100 MILLIEQUIVALENT(S): at 15:02

## 2022-01-02 RX ADMIN — HEPARIN SODIUM 5000 UNIT(S): 5000 INJECTION INTRAVENOUS; SUBCUTANEOUS at 05:23

## 2022-01-02 RX ADMIN — SODIUM CHLORIDE 1000 MILLILITER(S): 9 INJECTION INTRAMUSCULAR; INTRAVENOUS; SUBCUTANEOUS at 01:29

## 2022-01-02 RX ADMIN — Medication 100 MILLIEQUIVALENT(S): at 16:09

## 2022-01-02 RX ADMIN — OXYCODONE HYDROCHLORIDE 10 MILLIGRAM(S): 5 TABLET ORAL at 21:37

## 2022-01-02 RX ADMIN — FAMOTIDINE 20 MILLIGRAM(S): 10 INJECTION INTRAVENOUS at 00:24

## 2022-01-02 RX ADMIN — Medication 400 MILLIGRAM(S): at 03:59

## 2022-01-02 RX ADMIN — SODIUM CHLORIDE 1000 MILLILITER(S): 9 INJECTION, SOLUTION INTRAVENOUS at 05:23

## 2022-01-02 RX ADMIN — MORPHINE SULFATE 2 MILLIGRAM(S): 50 CAPSULE, EXTENDED RELEASE ORAL at 08:09

## 2022-01-02 RX ADMIN — MORPHINE SULFATE 4 MILLIGRAM(S): 50 CAPSULE, EXTENDED RELEASE ORAL at 00:21

## 2022-01-02 RX ADMIN — Medication 1000 MILLIGRAM(S): at 19:30

## 2022-01-02 RX ADMIN — Medication 30 MILLILITER(S): at 00:24

## 2022-01-02 RX ADMIN — PIPERACILLIN AND TAZOBACTAM 25 GRAM(S): 4; .5 INJECTION, POWDER, LYOPHILIZED, FOR SOLUTION INTRAVENOUS at 21:36

## 2022-01-02 RX ADMIN — ONDANSETRON 4 MILLIGRAM(S): 8 TABLET, FILM COATED ORAL at 12:46

## 2022-01-02 RX ADMIN — Medication 1000 MILLIGRAM(S): at 18:38

## 2022-01-02 RX ADMIN — PIPERACILLIN AND TAZOBACTAM 25 GRAM(S): 4; .5 INJECTION, POWDER, LYOPHILIZED, FOR SOLUTION INTRAVENOUS at 12:32

## 2022-01-02 RX ADMIN — Medication 100 MILLIEQUIVALENT(S): at 17:23

## 2022-01-02 RX ADMIN — OXYCODONE HYDROCHLORIDE 10 MILLIGRAM(S): 5 TABLET ORAL at 22:37

## 2022-01-02 RX ADMIN — Medication 1.25 MILLIGRAM(S): at 04:43

## 2022-01-02 RX ADMIN — SODIUM CHLORIDE 125 MILLILITER(S): 9 INJECTION, SOLUTION INTRAVENOUS at 12:09

## 2022-01-02 RX ADMIN — HEPARIN SODIUM 5000 UNIT(S): 5000 INJECTION INTRAVENOUS; SUBCUTANEOUS at 18:37

## 2022-01-02 RX ADMIN — PIPERACILLIN AND TAZOBACTAM 200 GRAM(S): 4; .5 INJECTION, POWDER, LYOPHILIZED, FOR SOLUTION INTRAVENOUS at 04:44

## 2022-01-02 NOTE — DISCHARGE NOTE PROVIDER - NSDCMRMEDTOKEN_GEN_ALL_CORE_FT
amLODIPine 10 mg oral tablet: 1 tab(s) orally once a day  aspirin 325 mg oral delayed release tablet: 1 tab(s) orally once a day  cyclobenzaprine 5 mg oral tablet: 1 tab(s) orally 3 times a day, As needed, Muscle Spasm MDD:15mg  Flonase 50 mcg/inh nasal spray: 1 spray(s) nasal once a day  metFORMIN 500 mg oral tablet: 1 tab(s) orally 2 times a day  pantoprazole 40 mg oral delayed release tablet: 1 tab(s) orally once a day (before a meal) x 30 days   potassium chloride 20 mEq oral tablet, extended release: 1 tab(s) orally once a day   acetaminophen 500 mg oral tablet: 2 tab(s) orally every 6 hours  amLODIPine 10 mg oral tablet: 1 tab(s) orally once a day  aspirin 325 mg oral delayed release tablet: 1 tab(s) orally once a day  cyclobenzaprine 5 mg oral tablet: 1 tab(s) orally 3 times a day, As needed, Muscle Spasm MDD:15mg  Flonase 50 mcg/inh nasal spray: 1 spray(s) nasal once a day  metFORMIN 500 mg oral tablet: 1 tab(s) orally 2 times a day  oxyCODONE 5 mg oral tablet: 1-2 tab(s) orally every 4-6 hours, As needed, Moderate to Severe Pain MDD:8  pantoprazole 40 mg oral delayed release tablet: 1 tab(s) orally once a day (before a meal) x 30 days   potassium chloride 20 mEq oral tablet, extended release: 1 tab(s) orally once a day   acetaminophen 500 mg oral tablet: 2 tab(s) orally every 6 hours  amLODIPine 10 mg oral tablet: 1 tab(s) orally once a day  cyclobenzaprine 5 mg oral tablet: 1 tab(s) orally 3 times a day, As needed, Muscle Spasm MDD:15mg  Flonase 50 mcg/inh nasal spray: 1 spray(s) nasal once a day  metFORMIN 500 mg oral tablet: 1 tab(s) orally 2 times a day  oxyCODONE 5 mg oral tablet: 1-2 tab(s) orally every 4-6 hours, As needed, Moderate to Severe Pain MDD:8  pantoprazole 40 mg oral delayed release tablet: 1 tab(s) orally once a day (before a meal) x 30 days   potassium chloride 20 mEq oral tablet, extended release: 1 tab(s) orally once a day

## 2022-01-02 NOTE — DISCHARGE NOTE PROVIDER - NSDCCPCAREPLAN_GEN_ALL_CORE_FT
PRINCIPAL DISCHARGE DIAGNOSIS  Diagnosis: Acute cholecystitis  Assessment and Plan of Treatment:        PRINCIPAL DISCHARGE DIAGNOSIS  Diagnosis: Acute cholecystitis  Assessment and Plan of Treatment: Follow up with Dr. Finley in 1-2 weeks. Please call to schedule an appointment.   NOTIFY YOUR SURGEON IF: You have any bleeding that does not stop, any pus draining from your wound, any fever (over 100.4 F) or chills, persistent nausea/vomiting, persistent diarrhea, or if your pain is not controlled on your discharge pain medications.    Wound: You may take off outer pink dressing and shower after 24 hours. After showering, pat steri strips dry. Leave the white steri strips in place, they will fall off on their own in approximately 5-7 days or they will be removed at your follow up appointment.      SECONDARY DISCHARGE DIAGNOSES  Diagnosis: Hypokalemia  Assessment and Plan of Treatment: Now resolved after repletions

## 2022-01-02 NOTE — DISCHARGE NOTE PROVIDER - CARE PROVIDER_API CALL
Malcolm Finley (MD)  Surgery  [address] [facility], [address] [place] [address]  Phone: [phone]  Fax: [phone]  Follow Up Time: 2 weeks

## 2022-01-02 NOTE — H&P ADULT - NSHPPHYSICALEXAM_GEN_ALL_CORE
Vital Signs Last 24 Hrs  T(C): 37.2 (01 Jan 2022 19:52), Max: 37.2 (01 Jan 2022 19:52)  T(F): 98.9 (01 Jan 2022 19:52), Max: 98.9 (01 Jan 2022 19:52)  HR: 69 (01 Jan 2022 19:52) (69 - 69)  BP: 156/88 (01 Jan 2022 19:52) (156/88 - 156/88)  RR: 20 (01 Jan 2022 19:52) (20 - 20)  SpO2: 99% (01 Jan 2022 19:52) (99% - 99%)    PHYSICAL EXAM:  GENERAL: NAD, well-groomed, well-developed  HEAD:  Atraumatic, Normocephalic  EYES: EOMI, PERRL, conjunctiva and sclera clear  ENMT: No tonsillar erythema, exudates, or enlargement; Moist mucous membranes  NECK: Supple, No JVD, Normal thyroid  NERVOUS SYSTEM:  Alert & Oriented X3, Good concentration  CHEST/LUNG: Clear to auscultation bilaterally; No rales, rhonchi, wheezing, or rubs  HEART: Regular rate and rhythm; No murmurs appreciated  ABDOMEN: Soft, TTP in RUQ, Nondistended; Bowel sounds present  MSK: ROM intact in all extremities, 5/5 strength in upper and lower extremities, B/L.  EXTREMITIES:  2+ Peripheral Pulses, No clubbing, cyanosis, or edema  LYMPH: No lymphadenopathy noted  SKIN: No rashes or lesions

## 2022-01-02 NOTE — PATIENT PROFILE ADULT - FALL HARM RISK - HARM RISK INTERVENTIONS

## 2022-01-02 NOTE — H&P ADULT - HISTORY OF PRESENT ILLNESS
57 YO F with a sig PMHx of HTN, and a PSHx of diagnostic lap. Patient presents with sharp right upper quadrant abdominal pain that started 2 days ago. Per patient she began to vomit once she came into the ED. Patient states she had similar pain in the past that resolved on its own, but this time it did not go away. Denies fever, chills, diarrhea, constipation, chest pain or shortness of breath.

## 2022-01-02 NOTE — DISCHARGE NOTE PROVIDER - NSDCFUSCHEDAPPT_GEN_ALL_CORE_FT
VIRAL GENEVE R ; 01/03/2022 ; NPP Cardio [id number]  VIRAL GENEVE R ; 01/04/2022 ; [place] [id number] [place] PEDRO Goodman R ; 02/03/2022 ; N[facility] [place] [id number] [place] PEDRO Goodman R ; 02/24/2022 ; [place] [id number] [place] Mikel STRATTON GENEVE R ; 03/04/2022 ; NPP [facility] [place] [address] [place] Mikel STRATTON GENEVE R ; 03/17/2022 ; NPP [facility] [address] [facility] PEDRO STRATTON R ; 01/04/2022 ; [place] [id number] [place] PEDRO Goodman R ; 02/03/2022 ; NPP [facility] [place] [address] [place] EPDRO Goodman R ; 02/24/2022 ; [place] [address] [place] PEDRO Goodman R ; 03/04/2022 ; [place] [facility] [place] [address] [place] PEDRO Goodman R ; 03/17/2022 ; NPP [address] PEDRO STRATTON R ; 02/03/2022 ; [place] [id number] [place] PEDRO Goodman R ; 02/24/2022 ; N[place] [id number] [place] PEDRO Goodman R ; 03/04/2022 ; [place] [id number] [place] PERDO Goodman R ; 03/17/2022 ; NPP [address]

## 2022-01-02 NOTE — H&P ADULT - ATTENDING COMMENTS
I saw and examined the patient, who has persistent RUQ tenderness, and have reviewed laboratory and imaging findings, which given the clinical presentation of the patient are consistent with acute cholecystitis. I offered the patient laparoscopic, possible open, cholecystectomy, and we discussed the indications, risks, benefits, and alternatives of the procedure, to which the patient stated she understood, gave consent, and all her questions were answered.

## 2022-01-02 NOTE — H&P ADULT - ASSESSMENT
57 YO F with a sig PMHx of HTN, and a PSHx of diagnostic lap, now with CT evidence of Distended gallbladder containing tumefactive sludge with pericholecystic edema, requires admission for acute cholecystitis. WBC 11.93    - Admit to general surgery  - Start IV antibiotics  - NPO/IVF  - VTE prophylaxis  - Ambulate as tolerated 57 YO F with a sig PMHx of HTN, and a PSHx of diagnostic lap, now with CT evidence of Distended gallbladder containing tumefactive sludge with pericholecystic edema, requires admission for acute cholecystitis. WBC 11.93    - Admit to general surgery  - OR scheduled for laparoscopic cholecystectomy, possible open  - Start IV antibiotics  - NPO/IVF  - VTE prophylaxis  - Ambulate as tolerated  Discussed with Dr. Finley

## 2022-01-02 NOTE — DISCHARGE NOTE PROVIDER - HOSPITAL COURSE
Patient is a 56 year old female with PMH HTN, DM who presented to the ED on 1/1/21 c/o RUQ abdominal pain with associated vomiting. CT in the ED showed CT a distended gallbladder containing tumefactive sludge with pericholecystic edema, and her clinical exam was consistent with acute cholecystitis. Patient was admitted, started on broad spectrum IV abx, and taken to the OR on 1/2/21 for a lap lesley. Post op, patient's diet was advanced as tolerated and her pain was controlled. Upon discharge, pt was hemodynamically stable, tolerating a regular diet, and was without complaints.

## 2022-01-02 NOTE — DISCHARGE NOTE PROVIDER - NSDCFUADDAPPT_GEN_ALL_CORE_FT
Please follow up with Dr. Finley in 10-14 days. You may call his office to schedule an appointment.  Please follow up with Dr. Finley in 7-14 days. You may call his office to schedule an appointment.  Please follow up with Dr. Finley in 7-14 days. You may call his office to schedule an appointment.     Please follow up with your primary care physician within 1 week of your discharge.

## 2022-01-02 NOTE — DISCHARGE NOTE PROVIDER - NSDCFUADDINST_GEN_ALL_CORE_FT
Drink plenty of fluids to prevent constipation and fruit juices without pulp that are high in vitamin C. Rest as needed. Do not lift anything heavier than 10 pounds. You may take motrin or advil for mild pain as needed, in addition to prescribed narcotic medication. You may take over the counter stool softeners as needed. Call for any fever over 101, nausea, vomiting, severe pain, no passing of gas or bowel movement. You may shower and pat dry abdomen. Leave the white steri strips in place; they will fall off in 5-7 days.   Please continue taking your home medications as prescribed by your primary care physician.

## 2022-01-03 ENCOUNTER — APPOINTMENT (OUTPATIENT)
Dept: CARDIOLOGY | Facility: CLINIC | Age: 57
End: 2022-01-03

## 2022-01-03 ENCOUNTER — RX RENEWAL (OUTPATIENT)
Age: 57
End: 2022-01-03

## 2022-01-03 LAB
A1C WITH ESTIMATED AVERAGE GLUCOSE RESULT: 6.3 % — HIGH (ref 4–5.6)
ALBUMIN SERPL ELPH-MCNC: 2.6 G/DL — LOW (ref 3.3–5)
ALP SERPL-CCNC: 81 U/L — SIGNIFICANT CHANGE UP (ref 40–120)
ALT FLD-CCNC: 32 U/L — SIGNIFICANT CHANGE UP (ref 12–78)
ANION GAP SERPL CALC-SCNC: 5 MMOL/L — SIGNIFICANT CHANGE UP (ref 5–17)
AST SERPL-CCNC: 26 U/L — SIGNIFICANT CHANGE UP (ref 15–37)
BILIRUB DIRECT SERPL-MCNC: 0.2 MG/DL — SIGNIFICANT CHANGE UP (ref 0–0.3)
BILIRUB INDIRECT FLD-MCNC: 0.6 MG/DL — SIGNIFICANT CHANGE UP (ref 0.2–1)
BILIRUB SERPL-MCNC: 0.8 MG/DL — SIGNIFICANT CHANGE UP (ref 0.2–1.2)
BUN SERPL-MCNC: 12 MG/DL — SIGNIFICANT CHANGE UP (ref 7–23)
CALCIUM SERPL-MCNC: 9.3 MG/DL — SIGNIFICANT CHANGE UP (ref 8.5–10.1)
CHLORIDE SERPL-SCNC: 109 MMOL/L — HIGH (ref 96–108)
CO2 SERPL-SCNC: 30 MMOL/L — SIGNIFICANT CHANGE UP (ref 22–31)
CREAT SERPL-MCNC: 0.82 MG/DL — SIGNIFICANT CHANGE UP (ref 0.5–1.3)
CULTURE RESULTS: SIGNIFICANT CHANGE UP
ESTIMATED AVERAGE GLUCOSE: 134 MG/DL — HIGH (ref 68–114)
GLUCOSE SERPL-MCNC: 119 MG/DL — HIGH (ref 70–99)
HCT VFR BLD CALC: 32.3 % — LOW (ref 34.5–45)
HGB BLD-MCNC: 10.5 G/DL — LOW (ref 11.5–15.5)
MAGNESIUM SERPL-MCNC: 2.2 MG/DL — SIGNIFICANT CHANGE UP (ref 1.6–2.6)
MCHC RBC-ENTMCNC: 28.8 PG — SIGNIFICANT CHANGE UP (ref 27–34)
MCHC RBC-ENTMCNC: 32.5 GM/DL — SIGNIFICANT CHANGE UP (ref 32–36)
MCV RBC AUTO: 88.5 FL — SIGNIFICANT CHANGE UP (ref 80–100)
NRBC # BLD: 0 /100 WBCS — SIGNIFICANT CHANGE UP (ref 0–0)
PHOSPHATE SERPL-MCNC: 3.8 MG/DL — SIGNIFICANT CHANGE UP (ref 2.5–4.5)
PLATELET # BLD AUTO: 296 K/UL — SIGNIFICANT CHANGE UP (ref 150–400)
POTASSIUM SERPL-MCNC: 4.5 MMOL/L — SIGNIFICANT CHANGE UP (ref 3.5–5.3)
POTASSIUM SERPL-SCNC: 4.5 MMOL/L — SIGNIFICANT CHANGE UP (ref 3.5–5.3)
PROT SERPL-MCNC: 7.3 GM/DL — SIGNIFICANT CHANGE UP (ref 6–8.3)
RBC # BLD: 3.65 M/UL — LOW (ref 3.8–5.2)
RBC # FLD: 12.7 % — SIGNIFICANT CHANGE UP (ref 10.3–14.5)
SODIUM SERPL-SCNC: 144 MMOL/L — SIGNIFICANT CHANGE UP (ref 135–145)
SPECIMEN SOURCE: SIGNIFICANT CHANGE UP
WBC # BLD: 10.82 K/UL — HIGH (ref 3.8–10.5)
WBC # FLD AUTO: 10.82 K/UL — HIGH (ref 3.8–10.5)

## 2022-01-03 RX ORDER — ACETAMINOPHEN 500 MG
2 TABLET ORAL
Qty: 0 | Refills: 0 | DISCHARGE
Start: 2022-01-03

## 2022-01-03 RX ORDER — ALBUTEROL 90 UG/1
2 AEROSOL, METERED ORAL EVERY 6 HOURS
Refills: 0 | Status: DISCONTINUED | OUTPATIENT
Start: 2022-01-03 | End: 2022-01-04

## 2022-01-03 RX ORDER — OXYCODONE HYDROCHLORIDE 5 MG/1
1 TABLET ORAL
Qty: 15 | Refills: 0
Start: 2022-01-03

## 2022-01-03 RX ADMIN — OXYCODONE HYDROCHLORIDE 5 MILLIGRAM(S): 5 TABLET ORAL at 05:46

## 2022-01-03 RX ADMIN — Medication 1000 MILLIGRAM(S): at 05:42

## 2022-01-03 RX ADMIN — Medication 1000 MILLIGRAM(S): at 01:02

## 2022-01-03 RX ADMIN — ONDANSETRON 4 MILLIGRAM(S): 8 TABLET, FILM COATED ORAL at 15:40

## 2022-01-03 RX ADMIN — PANTOPRAZOLE SODIUM 40 MILLIGRAM(S): 20 TABLET, DELAYED RELEASE ORAL at 08:01

## 2022-01-03 RX ADMIN — Medication 1000 MILLIGRAM(S): at 15:37

## 2022-01-03 RX ADMIN — OXYCODONE HYDROCHLORIDE 5 MILLIGRAM(S): 5 TABLET ORAL at 06:42

## 2022-01-03 RX ADMIN — HEPARIN SODIUM 5000 UNIT(S): 5000 INJECTION INTRAVENOUS; SUBCUTANEOUS at 05:41

## 2022-01-03 RX ADMIN — Medication 1000 MILLIGRAM(S): at 17:51

## 2022-01-03 RX ADMIN — Medication 1000 MILLIGRAM(S): at 23:02

## 2022-01-03 RX ADMIN — AMLODIPINE BESYLATE 10 MILLIGRAM(S): 2.5 TABLET ORAL at 05:40

## 2022-01-03 RX ADMIN — Medication 1000 MILLIGRAM(S): at 02:02

## 2022-01-03 RX ADMIN — SODIUM CHLORIDE 125 MILLILITER(S): 9 INJECTION, SOLUTION INTRAVENOUS at 08:01

## 2022-01-03 RX ADMIN — Medication 1000 MILLIGRAM(S): at 22:07

## 2022-01-03 RX ADMIN — Medication 1000 MILLIGRAM(S): at 11:31

## 2022-01-03 RX ADMIN — HEPARIN SODIUM 5000 UNIT(S): 5000 INJECTION INTRAVENOUS; SUBCUTANEOUS at 17:50

## 2022-01-03 RX ADMIN — Medication 1: at 22:07

## 2022-01-03 RX ADMIN — ONDANSETRON 4 MILLIGRAM(S): 8 TABLET, FILM COATED ORAL at 01:02

## 2022-01-03 RX ADMIN — PIPERACILLIN AND TAZOBACTAM 25 GRAM(S): 4; .5 INJECTION, POWDER, LYOPHILIZED, FOR SOLUTION INTRAVENOUS at 05:39

## 2022-01-03 RX ADMIN — SODIUM CHLORIDE 125 MILLILITER(S): 9 INJECTION, SOLUTION INTRAVENOUS at 05:42

## 2022-01-03 RX ADMIN — Medication 1000 MILLIGRAM(S): at 06:42

## 2022-01-03 RX ADMIN — Medication 1000 MILLIGRAM(S): at 17:50

## 2022-01-03 NOTE — PROGRESS NOTE ADULT - ATTENDING COMMENTS
Patient seen and examined with PAs  Doing well  Had some nausea, and pain when she ambulates  Was able to ambulate with walker    On exam: awake, alert  No scleral icterus  Abd is soft, not distended, appropriately tender  No rebound, no guarding  Incisions are clean, dry and intact without any surrounding erythema    - clear liquid diet, if nausea continues to remain controlled, can advance to regular diet tonight  - PT eval as patient required walker to ambulate

## 2022-01-04 ENCOUNTER — APPOINTMENT (OUTPATIENT)
Dept: INTERNAL MEDICINE | Facility: CLINIC | Age: 57
End: 2022-01-04

## 2022-01-04 ENCOUNTER — TRANSCRIPTION ENCOUNTER (OUTPATIENT)
Age: 57
End: 2022-01-04

## 2022-01-04 VITALS
OXYGEN SATURATION: 97 % | RESPIRATION RATE: 18 BRPM | SYSTOLIC BLOOD PRESSURE: 136 MMHG | DIASTOLIC BLOOD PRESSURE: 85 MMHG | TEMPERATURE: 98 F | HEART RATE: 72 BPM

## 2022-01-04 LAB
ALBUMIN SERPL ELPH-MCNC: 2.7 G/DL — LOW (ref 3.3–5)
ALP SERPL-CCNC: 83 U/L — SIGNIFICANT CHANGE UP (ref 40–120)
ALT FLD-CCNC: 35 U/L — SIGNIFICANT CHANGE UP (ref 12–78)
ANION GAP SERPL CALC-SCNC: 6 MMOL/L — SIGNIFICANT CHANGE UP (ref 5–17)
AST SERPL-CCNC: 26 U/L — SIGNIFICANT CHANGE UP (ref 15–37)
BILIRUB SERPL-MCNC: 0.6 MG/DL — SIGNIFICANT CHANGE UP (ref 0.2–1.2)
BUN SERPL-MCNC: 10 MG/DL — SIGNIFICANT CHANGE UP (ref 7–23)
CALCIUM SERPL-MCNC: 9.2 MG/DL — SIGNIFICANT CHANGE UP (ref 8.5–10.1)
CHLORIDE SERPL-SCNC: 104 MMOL/L — SIGNIFICANT CHANGE UP (ref 96–108)
CO2 SERPL-SCNC: 30 MMOL/L — SIGNIFICANT CHANGE UP (ref 22–31)
CREAT SERPL-MCNC: 0.78 MG/DL — SIGNIFICANT CHANGE UP (ref 0.5–1.3)
GLUCOSE SERPL-MCNC: 110 MG/DL — HIGH (ref 70–99)
HCT VFR BLD CALC: 34.6 % — SIGNIFICANT CHANGE UP (ref 34.5–45)
HGB BLD-MCNC: 11.2 G/DL — LOW (ref 11.5–15.5)
MAGNESIUM SERPL-MCNC: 2.6 MG/DL — SIGNIFICANT CHANGE UP (ref 1.6–2.6)
MCHC RBC-ENTMCNC: 27.9 PG — SIGNIFICANT CHANGE UP (ref 27–34)
MCHC RBC-ENTMCNC: 32.4 GM/DL — SIGNIFICANT CHANGE UP (ref 32–36)
MCV RBC AUTO: 86.3 FL — SIGNIFICANT CHANGE UP (ref 80–100)
NRBC # BLD: 0 /100 WBCS — SIGNIFICANT CHANGE UP (ref 0–0)
PHOSPHATE SERPL-MCNC: 2.2 MG/DL — LOW (ref 2.5–4.5)
PLATELET # BLD AUTO: 309 K/UL — SIGNIFICANT CHANGE UP (ref 150–400)
POTASSIUM SERPL-MCNC: 3.2 MMOL/L — LOW (ref 3.5–5.3)
POTASSIUM SERPL-SCNC: 3.2 MMOL/L — LOW (ref 3.5–5.3)
PROT SERPL-MCNC: 7.8 GM/DL — SIGNIFICANT CHANGE UP (ref 6–8.3)
RBC # BLD: 4.01 M/UL — SIGNIFICANT CHANGE UP (ref 3.8–5.2)
RBC # FLD: 12.6 % — SIGNIFICANT CHANGE UP (ref 10.3–14.5)
SODIUM SERPL-SCNC: 140 MMOL/L — SIGNIFICANT CHANGE UP (ref 135–145)
WBC # BLD: 8.15 K/UL — SIGNIFICANT CHANGE UP (ref 3.8–10.5)
WBC # FLD AUTO: 8.15 K/UL — SIGNIFICANT CHANGE UP (ref 3.8–10.5)

## 2022-01-04 RX ORDER — SODIUM,POTASSIUM PHOSPHATES 278-250MG
1 POWDER IN PACKET (EA) ORAL ONCE
Refills: 0 | Status: COMPLETED | OUTPATIENT
Start: 2022-01-04 | End: 2022-01-04

## 2022-01-04 RX ORDER — POTASSIUM CHLORIDE 20 MEQ
40 PACKET (EA) ORAL ONCE
Refills: 0 | Status: COMPLETED | OUTPATIENT
Start: 2022-01-04 | End: 2022-01-04

## 2022-01-04 RX ADMIN — Medication 1000 MILLIGRAM(S): at 18:39

## 2022-01-04 RX ADMIN — Medication 1 TABLET(S): at 13:30

## 2022-01-04 RX ADMIN — Medication 1000 MILLIGRAM(S): at 11:54

## 2022-01-04 RX ADMIN — Medication 1: at 16:22

## 2022-01-04 RX ADMIN — PANTOPRAZOLE SODIUM 40 MILLIGRAM(S): 20 TABLET, DELAYED RELEASE ORAL at 08:20

## 2022-01-04 RX ADMIN — Medication 40 MILLIEQUIVALENT(S): at 11:54

## 2022-01-04 RX ADMIN — Medication 1000 MILLIGRAM(S): at 18:08

## 2022-01-04 RX ADMIN — Medication 10 MILLIGRAM(S): at 18:08

## 2022-01-04 RX ADMIN — Medication 1000 MILLIGRAM(S): at 12:50

## 2022-01-04 RX ADMIN — HEPARIN SODIUM 5000 UNIT(S): 5000 INJECTION INTRAVENOUS; SUBCUTANEOUS at 05:44

## 2022-01-04 RX ADMIN — Medication 1000 MILLIGRAM(S): at 05:44

## 2022-01-04 RX ADMIN — Medication 1000 MILLIGRAM(S): at 06:44

## 2022-01-04 RX ADMIN — AMLODIPINE BESYLATE 10 MILLIGRAM(S): 2.5 TABLET ORAL at 05:44

## 2022-01-04 NOTE — DISCHARGE NOTE NURSING/CASE MANAGEMENT/SOCIAL WORK - NSDCPEFALRISK_GEN_ALL_CORE
For information on Fall & Injury Prevention, visit: [email or web address] OR  [email or web address] OR  https://www.cdc.gov/steadi/patient.html

## 2022-01-04 NOTE — CHART NOTE - NSCHARTNOTEFT_GEN_A_CORE
1/4/22  [address] [address]      To Whom It May Concern,     Aaliyah Herrera was admitted on 1/2/22, treated and discharged on 1/4/22 from [facility].  Patient may return to work. Do not lift more than 20lbs until cleared to do so by Surgeon.    If any questions or concerns please call.     Thanks     Madeleine Reyes, PA-C  [phone]  Beeper # [id number]

## 2022-01-04 NOTE — PHYSICAL THERAPY INITIAL EVALUATION ADULT - GAIT DEVIATIONS NOTED, PT EVAL
excessive flexion posture/decreased chato/increased time in double stance/decreased velocity of limb motion/decreased step length/decreased stride length

## 2022-01-04 NOTE — PHYSICAL THERAPY INITIAL EVALUATION ADULT - BALANCE TRAINING, PT EVAL
Pt will increase static/dynamic sitting balance to good and static/dynamic standing balance to good to perform all functional mobility without LOB, by discharge.

## 2022-01-04 NOTE — DISCHARGE NOTE NURSING/CASE MANAGEMENT/SOCIAL WORK - PATIENT PORTAL LINK FT
You can access the FollowMyHealth Patient Portal offered by [facility] by registering at the following website: [email or web address]. By joining [employer]’s FollowMyHealth portal, you will also be able to view your health information using other applications (apps) compatible with our system.

## 2022-01-04 NOTE — PROGRESS NOTE ADULT - SUBJECTIVE AND OBJECTIVE BOX
INTERVAL HPI/OVERNIGHT EVENTS:  Pt. seen and examined at bedside s/p Nathanael sutton POD #1. Patient c/o on and off nausea overnight. Was able to drink sips of water, but states she was unable to keep anything down last night. Pain well controlled, hurts mostly when she moves. Voiding well. Denies flatus yet.   Denies fever/chills, chest pain, dyspnea, cough, dizziness.     Vital Signs Last 24 Hrs  T(C): 36.8 (2022 02:04), Max: 36.8 (2022 16:45)  T(F): 98.2 (2022 02:04), Max: 98.3 (2022 16:45)  HR: 63 (2022 02:04) (62 - 75)  BP: 132/66 (2022 02:04) (113/58 - 135/77)  RR: 17 (2022 02:04) (12 - 18)  SpO2: 97% (2022 02:04) (93% - 100%)    PHYSICAL EXAM:    GENERAL: NAD  CHEST/LUNG: Clear to ausculation, bilaterally   HEART: S1S2, RRR  ABDOMEN: Nondistended, Steri strips CDI over port sites, Soft, appropriate incisional tenderness, no guarding   EXTREMITIES:  calf soft, non tender b/l. 2+ distal pulses b/l.     I&O's Detail    2022 07:01  -  2022 06:21  --------------------------------------------------------  IN:    IV PiggyBack: 75 mL    Lactated Ringers: 375 mL  Total IN: 450 mL    OUT:  Total OUT: 0 mL    Total NET: 450 mL      LABS:                        11.1   21.12 )-----------( 356      ( 2022 12:28 )             34.2     01-02    x   |  x   |  x   ----------------------------<  x   3.7   |  x   |  x     Ca    9.1      2022 12:18  Phos  3.3     01-  Mg     2.2     -    TPro  7.6  /  Alb  3.0<L>  /  TBili  1.1  /  DBili  x   /  AST  36  /  ALT  32  /  AlkPhos  96        Urinalysis Basic - ( 2022 23:11 )    Color: Yellow / Appearance: Slightly Turbid / S.010 / pH: x  Gluc: x / Ketone: Negative  / Bili: Negative / Urobili: Negative mg/dL   Blood: x / Protein: 100 mg/dL / Nitrite: Negative   Leuk Esterase: Negative / RBC: Negative /HPF / WBC 0-2   Sq Epi: x / Non Sq Epi: Occasional / Bacteria: Occasional      A/P: 56F s/p lap lesley POD #1. HD stable.     -- continue clears for now, advance diet once nausea resolves   -- zosyn to complete 24 hours  -- dvt ppx, incentive spirometer, ambulate as tolerated, pain meds PRN, antiemetics PRN  -- home meds, medical management and supportive care  -- f/u AM labs  --Will d/w Surgical attending 
Pre-operative Note    - Pre-operative Diagnosis: Acute cholecystitis, cholelithiasis    - Procedure: Lap lesley, poss open    - Labs:                        12.1   11.93 )-----------( 337      ( 2022 23:05 )             36.8         140  |  104  |  9   ----------------------------<  142<H>  3.4<L>   |  30  |  0.77    Ca    9.2      2022 23:05    TPro  8.5<H>  /  Alb  3.4  /  TBili  0.8  /  DBili  x   /  AST  18  /  ALT  19  /  AlkPhos  97      HC     Type & Screen: Pending collection    COVID: NEG    - Blood: Not needed.     - Orders:  > NPO  > Zosyn  > F/u labs    - Permits:  > Consent in chart  > Case scheduled with OR    
56F s/p lap lesley    Patient is seen and examined at bedside.   Admits to abdominal pain, well controlled with medication.   One episode of emesis in PACU.  Denies chest pain, SOB, palpitations.    Vital Signs Last 24 Hrs  T(F): 97.6 (01-02-22 @ 13:32), Max: 98.9 (01-01-22 @ 19:52)  HR: 62 (01-02-22 @ 13:58)  BP: 122/63 (01-02-22 @ 13:58)  RR: 14 (01-02-22 @ 13:58)  SpO2: 97% (01-02-22 @ 13:58)    GENERAL: Alert, oriented, NAD  CHEST/LUNG: Clear to auscultation bilaterally, respirations nonlabored  HEART: +S1S2, regular rate and rhythm  ABDOMEN: Soft, nondistended. Appropriate incisional tenderness. Dressings clean dry intact x 4 over trocar sites  EXTREMITIES: No calf tenderness, no edema. Intermittent pneumatic compression devices b/l LE    A/P: 56F s/p lap lesley  -- continue clears for now, advance diet as tolerated  -- zosyn to complete 24 hours  -- dvt ppx, incentive spirometer, ambulate as tolerated, pain meds PRN, antiemetics PRN  -- home meds   -- f/u AM labs  
INTERVAL HPI/OVERNIGHT EVENTS:  Pt. seen and examined at bedside resting comfortably. Patient c/o "burning" incisional pain postop, slightly improved from yesterday, well controlled with Tylenol. States her nausea was improved yesterday during the day but this morning feels mild nausea again, denies vomiting. Was able to tolerate small amounts of solid food yesterday. Denies flatus yet. Voiding well and ambulating to the restroom.   Denies fever/chills, chest pain, dyspnea, cough, dizziness.     Vital Signs Last 24 Hrs  T(C): 36.7 (04 Jan 2022 05:19), Max: 37 (03 Jan 2022 18:18)  T(F): 98 (04 Jan 2022 05:19), Max: 98.6 (03 Jan 2022 18:18)  HR: 61 (04 Jan 2022 05:19) (61 - 80)  BP: 130/69 (04 Jan 2022 05:19) (120/75 - 143/79)  RR: 18 (04 Jan 2022 05:19) (17 - 18)  SpO2: 98% (04 Jan 2022 05:19) (96% - 98%)    PHYSICAL EXAM:  GENERAL: NAD  CHEST/LUNG: Clear to ausculation, bilaterally   HEART: S1S2, RRR  ABDOMEN: Nondistended, Steri strips CDI over port sites, Soft, appropriate incisional tenderness, no guarding   EXTREMITIES:  calf soft, non tender b/l. 2+ distal pulses b/l.     I&O's Detail    02 Jan 2022 07:01  -  03 Jan 2022 07:00  --------------------------------------------------------  IN:    IV PiggyBack: 275 mL    Lactated Ringers: 1875 mL    Oral Fluid: 240 mL  Total IN: 2390 mL    OUT:  Total OUT: 0 mL    Total NET: 2390 mL      03 Jan 2022 07:01  -  04 Jan 2022 06:29  --------------------------------------------------------  IN:  Total IN: 0 mL    OUT:    Voided (mL): 1000 mL  Total OUT: 1000 mL    Total NET: -1000 mL      LABS:                        10.5   10.82 )-----------( 296      ( 03 Jan 2022 08:40 )             32.3     01-03    144  |  109<H>  |  12  ----------------------------<  119<H>  4.5   |  30  |  0.82    Ca    9.3      03 Jan 2022 08:40  Phos  3.8     01-03  Mg     2.2     01-03    TPro  7.3  /  Alb  2.6<L>  /  TBili  0.8  /  DBili  0.2  /  AST  26  /  ALT  32  /  AlkPhos  81  01-03    Culture Results:   <10,000 CFU/mL Normal Urogenital Kristi (01-02 @ 11:25)    A/P: 56F s/p lap lesley POD #2. HD stable. Leukocytosis improving, afebrile.   +nausea    -- Reg Diabetic low fat diet as tolerated   -- zosyn completed  -- dvt ppx, incentive spirometer, ambulate as tolerated, pain meds PRN, antiemetics PRN  -- home meds, medical management and supportive care  -- f/u AM labs  -- D/C planning home once able to tolerate reg diet and nausea resolved  --Will d/w Surgical attending

## 2022-01-04 NOTE — PHYSICAL THERAPY INITIAL EVALUATION ADULT - IMPAIRED TRANSFERS: SIT/STAND, REHAB EVAL
DISPLAY PLAN FREE TEXT
impaired balance/decreased flexibility/pain/impaired postural control/decreased strength

## 2022-01-04 NOTE — PHYSICAL THERAPY INITIAL EVALUATION ADULT - ADDITIONAL COMMENTS
There are 5-6 steps, c R rail up, at the entry of the house and 15-16 steps, c L rail up, to negotiate at home.

## 2022-01-04 NOTE — PHYSICAL THERAPY INITIAL EVALUATION ADULT - GENERAL OBSERVATIONS, REHAB EVAL
Pt was seen semi-supine c abdominal dressing C/D/I, alert and Ox4. Pt c/o 7/10 pain but was motivated.

## 2022-01-05 ENCOUNTER — NON-APPOINTMENT (OUTPATIENT)
Age: 57
End: 2022-01-05

## 2022-01-13 DIAGNOSIS — E66.9 OBESITY, UNSPECIFIED: ICD-10-CM

## 2022-01-13 DIAGNOSIS — Z79.84 LONG TERM (CURRENT) USE OF ORAL HYPOGLYCEMIC DRUGS: ICD-10-CM

## 2022-01-13 DIAGNOSIS — K80.00 CALCULUS OF GALLBLADDER WITH ACUTE CHOLECYSTITIS WITHOUT OBSTRUCTION: ICD-10-CM

## 2022-01-13 DIAGNOSIS — E87.6 HYPOKALEMIA: ICD-10-CM

## 2022-01-13 DIAGNOSIS — Z79.51 LONG TERM (CURRENT) USE OF INHALED STEROIDS: ICD-10-CM

## 2022-01-13 DIAGNOSIS — Z79.82 LONG TERM (CURRENT) USE OF ASPIRIN: ICD-10-CM

## 2022-01-13 DIAGNOSIS — E11.9 TYPE 2 DIABETES MELLITUS WITHOUT COMPLICATIONS: ICD-10-CM

## 2022-01-13 DIAGNOSIS — E83.39 OTHER DISORDERS OF PHOSPHORUS METABOLISM: ICD-10-CM

## 2022-01-13 DIAGNOSIS — I10 ESSENTIAL (PRIMARY) HYPERTENSION: ICD-10-CM

## 2022-01-18 ENCOUNTER — APPOINTMENT (OUTPATIENT)
Dept: SURGERY | Facility: CLINIC | Age: 57
End: 2022-01-18
Payer: MEDICAID

## 2022-01-18 VITALS
SYSTOLIC BLOOD PRESSURE: 142 MMHG | DIASTOLIC BLOOD PRESSURE: 84 MMHG | OXYGEN SATURATION: 95 % | HEART RATE: 77 BPM | TEMPERATURE: 98.5 F | WEIGHT: 178 LBS | HEIGHT: 63 IN | BODY MASS INDEX: 31.54 KG/M2

## 2022-01-18 PROCEDURE — 99024 POSTOP FOLLOW-UP VISIT: CPT

## 2022-01-18 NOTE — HISTORY OF PRESENT ILLNESS
[de-identified] : Patient is a 56 year old woman who underwent laparoscopic cholecystectomy on 1/2/2022 for acute cholecystitis. Since discharge, she has been feeling well, states that pain has been well controlled. She is tolerating diet well, denies N/V, no fever chills. No other complaints at this time.\par \par Pathology reveals acute and chronic cholecystitis, cholelithiasis, one benign reactive lymph node with lipgranuloma\par \par Examination reveals well healing incision sites, no incisional hernia, non-TTP in all quadrants, no rebound/guarding\par \par Patient is healing well and advised to avoid heavy lifting for total of 3 weeks. RTC PRN.

## 2022-01-20 ENCOUNTER — APPOINTMENT (OUTPATIENT)
Dept: INTERNAL MEDICINE | Facility: CLINIC | Age: 57
End: 2022-01-20
Payer: MEDICAID

## 2022-01-20 ENCOUNTER — OUTPATIENT (OUTPATIENT)
Dept: OUTPATIENT SERVICES | Facility: HOSPITAL | Age: 57
LOS: 1 days | End: 2022-01-20

## 2022-01-20 VITALS
OXYGEN SATURATION: 99 % | TEMPERATURE: 97.2 F | WEIGHT: 182 LBS | BODY MASS INDEX: 32.25 KG/M2 | SYSTOLIC BLOOD PRESSURE: 138 MMHG | HEIGHT: 63 IN | DIASTOLIC BLOOD PRESSURE: 85 MMHG | HEART RATE: 70 BPM

## 2022-01-20 DIAGNOSIS — Z98.89 OTHER SPECIFIED POSTPROCEDURAL STATES: Chronic | ICD-10-CM

## 2022-01-20 PROCEDURE — 99214 OFFICE O/P EST MOD 30 MIN: CPT | Mod: GC

## 2022-01-21 DIAGNOSIS — F41.9 ANXIETY DISORDER, UNSPECIFIED: ICD-10-CM

## 2022-01-21 DIAGNOSIS — Z23 ENCOUNTER FOR IMMUNIZATION: ICD-10-CM

## 2022-01-21 DIAGNOSIS — R00.2 PALPITATIONS: ICD-10-CM

## 2022-01-21 DIAGNOSIS — E11.29 TYPE 2 DIABETES MELLITUS WITH OTHER DIABETIC KIDNEY COMPLICATION: ICD-10-CM

## 2022-01-21 NOTE — PHYSICAL EXAM
[Normal] : affect was normal and insight and judgment were intact [de-identified] : multiple, well healed laparoscopic port scars scattered over abdomen, no surrounding erythema

## 2022-01-21 NOTE — HISTORY OF PRESENT ILLNESS
[Post-hospitalization from ___ Hospital] : Post-hospitalization from [unfilled] Hospital [Admitted on: ___] : The patient was admitted on [unfilled] [Discharged on ___] : discharged on [unfilled] [FreeTextEntry2] : 56F w/ PMH of HTN, DM2 (a1c 6.4), hepatic steatosis, stage 1 obesity presenting to the office after being hospitalized for abdominal pain and found to have cholecystitis. She underwent a laparoscopic cholecystectomy, which she tolerated well. Since discharge she has been feeling well, without any continued abdominal pain, nausea, vomiting, diarrhea, constipation, fevers, chills or any other related complaints. She followed up with her surgeon two days ago and has no concerns. \par \par She does still report intermittent palpitations, which she has had for months. She had an appointment to see cardiology on 1/3/22 but unfortunately missed it due to her hospitalization. She has a new appointment scheduled for March of this year. She states that she thinks the palpitation episodes are beginning to occur with less frequency. She denies any associated SOB, chest pain, lightheadedness, dizziness, or other concerning symptoms. The episodes do not seem to be triggered by exertion, she states that she sometimes gets them when she lays down to go to sleep but she cannot think of particular exacerbating or alleviating factors. \par \par Otherwise, she feels well and has no other complaints.

## 2022-01-21 NOTE — ASSESSMENT
[FreeTextEntry1] : #HCM\par Flu shot: Oct 4, 2021\par BMI: 31.89, refusing nutrtion referral at this time\par HIV: Negative 2017\par Hep C: Negative 2016\par COVID vaccination: received \par Coloscopy: Jan 2020\par Tdap 15 Feb 2018\par PNA 23 15 Feb 2018\par Mammo: September 2020\par Pap: Due Aug 2022\par \par Case d/w Dr. Balderas\par Kem Land MD

## 2022-01-21 NOTE — REVIEW OF SYSTEMS
[Palpitations] : palpitations [Negative] : Neurological [Chest Pain] : no chest pain [Leg Claudication] : no leg claudication [Lower Ext Edema] : no lower extremity edema [Orthopnea] : no orthopnea [Paroxysmal Nocturnal Dyspnea] : no paroxysmal nocturnal dyspnea

## 2022-01-31 ENCOUNTER — RX RENEWAL (OUTPATIENT)
Age: 57
End: 2022-01-31

## 2022-02-03 ENCOUNTER — OUTPATIENT (OUTPATIENT)
Dept: OUTPATIENT SERVICES | Facility: HOSPITAL | Age: 57
LOS: 1 days | End: 2022-02-03

## 2022-02-03 ENCOUNTER — APPOINTMENT (OUTPATIENT)
Dept: GASTROENTEROLOGY | Facility: CLINIC | Age: 57
End: 2022-02-03
Payer: MEDICAID

## 2022-02-03 VITALS
BODY MASS INDEX: 32.25 KG/M2 | HEIGHT: 63 IN | TEMPERATURE: 97.8 F | SYSTOLIC BLOOD PRESSURE: 160 MMHG | HEART RATE: 83 BPM | OXYGEN SATURATION: 99 % | DIASTOLIC BLOOD PRESSURE: 80 MMHG | WEIGHT: 182 LBS

## 2022-02-03 DIAGNOSIS — E66.9 OBESITY, UNSPECIFIED: ICD-10-CM

## 2022-02-03 DIAGNOSIS — Z98.89 OTHER SPECIFIED POSTPROCEDURAL STATES: Chronic | ICD-10-CM

## 2022-02-03 DIAGNOSIS — K76.0 FATTY (CHANGE OF) LIVER, NOT ELSEWHERE CLASSIFIED: ICD-10-CM

## 2022-02-03 PROCEDURE — 99213 OFFICE O/P EST LOW 20 MIN: CPT | Mod: GC

## 2022-02-03 PROCEDURE — 99203 OFFICE O/P NEW LOW 30 MIN: CPT | Mod: GC

## 2022-02-03 NOTE — PHYSICAL EXAM
[General Appearance - Alert] : alert [General Appearance - In No Acute Distress] : in no acute distress [Sclera] : the sclera and conjunctiva were normal [Outer Ear] : the ears and nose were normal in appearance [Neck Appearance] : the appearance of the neck was normal [Heart Rate And Rhythm] : heart rate was normal and rhythm regular [Bowel Sounds] : normal bowel sounds [Abdomen Soft] : soft [Abdomen Tenderness] : non-tender [] : no rash [Oriented To Time, Place, And Person] : oriented to person, place, and time [Cognitive Mini-Mental Status Normal?] : Cognitive Mini Mental Status Exam is normal [General Appearance - Well Nourished] : well nourished [General Appearance - Well Developed] : well developed [Edema] : there was no peripheral edema [Skin Turgor] : normal skin turgor [No Focal Deficits] : no focal deficits [Scleral Icterus] : No Scleral Icterus [Abdominal  Ascites] : no ascites [Splenomegaly] : no splenomegaly [Liver Palpable ___ Finger Breadths Below Costal Margin] : was not palpable below costal margin [Asterixis] : no asterixis observed [Jaundice] : No jaundice

## 2022-02-03 NOTE — HISTORY OF PRESENT ILLNESS
[de-identified] : n/a [de-identified] : Pt is a 57yo F with PMH T2DM, obesity, HTN here for initial evaluation for hepatic steatosis. Patient had u/s in 12/2019 with moderate steatosis. Recently had a hospitalization with abdominal pain and cholecystitis s/p CCY. Her imaging then without steatosis. She states she has frequent sweets and snacks. She has gained significant weight over the last few months. Denies any alcohol use, drug use or tobacco use. No new medications. Continues on Januvia for T2DM. Denies pruritis, jaundice, abdominal pain, n/v, melena or hematochezia.

## 2022-02-03 NOTE — ASSESSMENT
[FreeTextEntry1] : Impression:\par # Hepatic steatosis: seen on u/s in 12/2019. More recent imaging without steatosis. Unclear why such discordance without any weight changes. No obvious medications prior that would have causes steatosis. Likely 2/2 NAFLD prior vs over read. Hepatitis viral panel negative. Patient with risk factors of obesity and T2DM. \par # Obesity\par # T2DM on januvia\par # CRC screening\par \par Plan:\par - will review with radiology\par - check fibroscan\par - counselled at length on diet and exercise\par - RTC after fibroscan

## 2022-02-03 NOTE — REVIEW OF SYSTEMS
[Negative] : Heme/Lymph [Fever] : no fever [Chills] : no chills [Eye Pain] : no eye pain [Red Eyes] : eyes not red [Loss Of Hearing] : no hearing loss [Chest Pain] : no chest pain [Shortness Of Breath] : no shortness of breath [Wheezing] : no wheezing [Cough] : no cough [Dysuria] : no dysuria [Limb Pain] : no limb pain [Limb Swelling] : no limb swelling [Itching] : no itching [Muscle Weakness] : no muscle weakness [Feelings Of Weakness] : no feelings of weakness

## 2022-03-04 ENCOUNTER — APPOINTMENT (OUTPATIENT)
Dept: DERMATOLOGY | Facility: CLINIC | Age: 57
End: 2022-03-04

## 2022-03-11 ENCOUNTER — APPOINTMENT (OUTPATIENT)
Dept: CARDIOLOGY | Facility: CLINIC | Age: 57
End: 2022-03-11
Payer: MEDICAID

## 2022-03-11 ENCOUNTER — NON-APPOINTMENT (OUTPATIENT)
Age: 57
End: 2022-03-11

## 2022-03-11 VITALS
SYSTOLIC BLOOD PRESSURE: 144 MMHG | HEART RATE: 70 BPM | BODY MASS INDEX: 31.54 KG/M2 | HEIGHT: 63 IN | DIASTOLIC BLOOD PRESSURE: 88 MMHG | WEIGHT: 178 LBS

## 2022-03-11 VITALS — SYSTOLIC BLOOD PRESSURE: 134 MMHG | DIASTOLIC BLOOD PRESSURE: 82 MMHG

## 2022-03-11 PROCEDURE — 93000 ELECTROCARDIOGRAM COMPLETE: CPT

## 2022-03-11 PROCEDURE — 99204 OFFICE O/P NEW MOD 45 MIN: CPT

## 2022-03-11 NOTE — HISTORY OF PRESENT ILLNESS
[FreeTextEntry1] : 57F with HTN, HLD, fatty liver disease, DM who presents for cardiac evaluation\par \par Pt notes rare, sporadic episodes of palpitations, CP\par Was having more frequent palpitations but have improved since having her gallbladder removed in early Jan 2022\par Notices it most when lying on her L side, sometimes after eating \par Occurring a few times a week\par Also notes an occasional dull ache, feels like a "gas pain" sometimes positional\par Has felt this way since starting lisinopril- she thinks it may be a side effect of the medication\par Had NST in 2020 without ischemia\par Some dyspnea with heavy exertion\par \par Never smoker. Denies family hx of CAD. Works as a  at the senior center\par \par ECG: SR, no ST-T wave changes\par NST 2020: No ischemia, EF 61%\par \par Atorvastatin 20mg\par \par Amlodipine 10mg\par Lisinopril 40mg

## 2022-03-11 NOTE — DISCUSSION/SUMMARY
[FreeTextEntry1] : Palpitations: Likely benign. Occur while on left side and also after eating. Lessened after cholecystectomy. \par \par Will attach 4 day ZIO \par Educated on triggers and avoidance, encourage hydration\par \par HTN- Improving on recheck\par Cont lisinopril, amlodipine\par ?CP from lisinopril- she had a similar symptom with losartan. Consider switching to different class\par \par HLD- Cont statin, last lipids in computer from 2019, needs repeat\par \par CP- Chronic, negative NST last year. Likely noncardiac. She has had going back as far as 2016 per notes. Consider further imaging. Check echo\par \par RV 6M\par ZIO\par TTE\par \par Establish care with PCP downstairs

## 2022-03-17 ENCOUNTER — APPOINTMENT (OUTPATIENT)
Dept: OPHTHALMOLOGY | Facility: CLINIC | Age: 57
End: 2022-03-17

## 2022-03-31 ENCOUNTER — APPOINTMENT (OUTPATIENT)
Dept: INTERNAL MEDICINE | Facility: CLINIC | Age: 57
End: 2022-03-31

## 2022-04-08 ENCOUNTER — NON-APPOINTMENT (OUTPATIENT)
Age: 57
End: 2022-04-08

## 2022-04-18 ENCOUNTER — APPOINTMENT (OUTPATIENT)
Dept: INTERNAL MEDICINE | Facility: CLINIC | Age: 57
End: 2022-04-18
Payer: MEDICAID

## 2022-04-18 PROCEDURE — 93306 TTE W/DOPPLER COMPLETE: CPT

## 2022-04-26 ENCOUNTER — RX RENEWAL (OUTPATIENT)
Age: 57
End: 2022-04-26

## 2022-06-06 ENCOUNTER — OUTPATIENT (OUTPATIENT)
Dept: OUTPATIENT SERVICES | Facility: HOSPITAL | Age: 57
LOS: 1 days | End: 2022-06-06

## 2022-06-06 ENCOUNTER — APPOINTMENT (OUTPATIENT)
Dept: INTERNAL MEDICINE | Facility: CLINIC | Age: 57
End: 2022-06-06
Payer: MEDICAID

## 2022-06-06 VITALS
OXYGEN SATURATION: 99 % | TEMPERATURE: 97.5 F | DIASTOLIC BLOOD PRESSURE: 78 MMHG | WEIGHT: 186 LBS | HEIGHT: 63 IN | HEART RATE: 63 BPM | BODY MASS INDEX: 32.96 KG/M2 | RESPIRATION RATE: 14 BRPM | SYSTOLIC BLOOD PRESSURE: 146 MMHG

## 2022-06-06 DIAGNOSIS — Z98.89 OTHER SPECIFIED POSTPROCEDURAL STATES: Chronic | ICD-10-CM

## 2022-06-06 PROCEDURE — 99214 OFFICE O/P EST MOD 30 MIN: CPT | Mod: GC

## 2022-06-06 RX ORDER — FLUTICASONE PROPIONATE 50 UG/1
50 SPRAY, METERED NASAL
Qty: 16 | Refills: 3 | Status: DISCONTINUED | COMMUNITY
Start: 2017-08-23 | End: 2022-06-06

## 2022-06-06 RX ORDER — PANTOPRAZOLE SODIUM 40 MG/1
40 GRANULE, DELAYED RELEASE ORAL
Qty: 30 | Refills: 0 | Status: DISCONTINUED | COMMUNITY
Start: 2021-11-08 | End: 2022-06-06

## 2022-06-06 RX ORDER — POLYETHYLENE GLYCOL 3350 17 G/17G
17 POWDER, FOR SOLUTION ORAL DAILY
Qty: 20 | Refills: 0 | Status: DISCONTINUED | COMMUNITY
Start: 2021-12-13 | End: 2022-06-06

## 2022-06-06 NOTE — HEALTH RISK ASSESSMENT
[Never] : Never [No] : No [0] : 1) Little interest or pleasure doing things: Not at all (0) [1] : 2) Feeling down, depressed, or hopeless for several days (1)

## 2022-06-07 LAB
ALBUMIN SERPL ELPH-MCNC: 4.6 G/DL
ALP BLD-CCNC: 96 U/L
ALT SERPL-CCNC: 14 U/L
ANION GAP SERPL CALC-SCNC: 14 MMOL/L
AST SERPL-CCNC: 19 U/L
BASOPHILS # BLD AUTO: 0.03 K/UL
BASOPHILS NFR BLD AUTO: 0.3 %
BILIRUB SERPL-MCNC: 0.6 MG/DL
BUN SERPL-MCNC: 11 MG/DL
CALCIUM SERPL-MCNC: 9.6 MG/DL
CHLORIDE SERPL-SCNC: 101 MMOL/L
CHOLEST SERPL-MCNC: 122 MG/DL
CO2 SERPL-SCNC: 28 MMOL/L
CREAT SERPL-MCNC: 0.79 MG/DL
CREAT SPEC-SCNC: 86 MG/DL
EGFR: 86 ML/MIN/1.73M2
EOSINOPHIL # BLD AUTO: 0.08 K/UL
EOSINOPHIL NFR BLD AUTO: 0.7 %
ESTIMATED AVERAGE GLUCOSE: 143 MG/DL
GLUCOSE SERPL-MCNC: 113 MG/DL
HBA1C MFR BLD HPLC: 6.6 %
HCT VFR BLD CALC: 40 %
HDLC SERPL-MCNC: 57 MG/DL
HGB BLD-MCNC: 12.8 G/DL
IMM GRANULOCYTES NFR BLD AUTO: 0.3 %
LDLC SERPL CALC-MCNC: 46 MG/DL
LYMPHOCYTES # BLD AUTO: 2.8 K/UL
LYMPHOCYTES NFR BLD AUTO: 25 %
MAN DIFF?: NORMAL
MCHC RBC-ENTMCNC: 28.5 PG
MCHC RBC-ENTMCNC: 32 GM/DL
MCV RBC AUTO: 89.1 FL
MICROALBUMIN 24H UR DL<=1MG/L-MCNC: 7.1 MG/DL
MICROALBUMIN/CREAT 24H UR-RTO: 82 MG/G
MONOCYTES # BLD AUTO: 0.56 K/UL
MONOCYTES NFR BLD AUTO: 5 %
NEUTROPHILS # BLD AUTO: 7.72 K/UL
NEUTROPHILS NFR BLD AUTO: 68.7 %
NONHDLC SERPL-MCNC: 65 MG/DL
PLATELET # BLD AUTO: 300 K/UL
POTASSIUM SERPL-SCNC: 3.6 MMOL/L
PROT SERPL-MCNC: 7.9 G/DL
RBC # BLD: 4.49 M/UL
RBC # FLD: 12.7 %
SODIUM SERPL-SCNC: 143 MMOL/L
TRIGL SERPL-MCNC: 94 MG/DL
TSH SERPL-ACNC: 0.63 UIU/ML
WBC # FLD AUTO: 11.22 K/UL

## 2022-06-07 NOTE — ASSESSMENT
[FreeTextEntry1] :  56 F w/ PMH of HTN, DM2 (a1c 6.4), hepatic steatosis, stage 1 obesity, s/p cholecystectomy 1/22 presenting for follow up on palpitations after negative cardiac work up, pending thyroid studies and behavioral therapy Affect and characteristics of appearance, verbalizations, behaviors are appropriate negative

## 2022-06-07 NOTE — HISTORY OF PRESENT ILLNESS
[de-identified] : 56F w/ PMH of HTN, DM2 (a1c 6.4), hepatic steatosis, stage 1 obesity, s/p cholecystectomy 1/22 presenting for follow up on palpitations. The patient was evaluated by cardiology with whom she got an echo and holter monitor evaluation, which were both negative for any acute findings. The patient was started on metoprolol however she still feels the palpitations and does not feel like the metoprolol is helping. The patient states the palpitations are on/off all day, lasting for 4-5 minutes each, no associated pain or radiation, it is not associated with exertion. She feels like every time she eats/drinks the palpitations come on, they are worse when she lies on her left side. She believes it could be associated with the lisinopril she is taking. She does not have any N/V/C/D. She endorses some polyuria but no dysuria or hematuria. She walks about 2.5 miles everyday. She says that she is stressed due to work (she's a ) and her children.

## 2022-06-07 NOTE — ADDENDUM
[FreeTextEntry1] : Case discussed with Dr. Hoffman\par Total time of encounter 30 min\par RTC in 10 weeks\par \par \par Vani Ding, PGY-1\par [employer] [facility]\par

## 2022-06-07 NOTE — REVIEW OF SYSTEMS
[Palpitations] : palpitations [Frequency] : frequency [Anxiety] : anxiety [Negative] : Heme/Lymph [Fever] : no fever [Chills] : no chills [Fatigue] : no fatigue [Chest Pain] : no chest pain [Leg Claudication] : no leg claudication [Lower Ext Edema] : no lower extremity edema [Orthopnea] : no orthopnea [Shortness Of Breath] : no shortness of breath [Wheezing] : no wheezing [Cough] : no cough [Abdominal Pain] : no abdominal pain [Nausea] : no nausea [Constipation] : no constipation [Diarrhea] : diarrhea [Vomiting] : no vomiting [Heartburn] : no heartburn [Dysuria] : no dysuria [Incontinence] : no incontinence [Hematuria] : no hematuria [Vaginal Discharge] : no vaginal discharge [Joint Pain] : no joint pain [Joint Stiffness] : no joint stiffness [Muscle Pain] : no muscle pain [Back Pain] : no back pain [Itching] : no itching [Headache] : no headache [Dizziness] : no dizziness [Fainting] : no fainting [Confusion] : no confusion [Suicidal] : not suicidal

## 2022-06-08 DIAGNOSIS — R00.2 PALPITATIONS: ICD-10-CM

## 2022-06-08 DIAGNOSIS — I10 ESSENTIAL (PRIMARY) HYPERTENSION: ICD-10-CM

## 2022-06-08 DIAGNOSIS — E78.49 OTHER HYPERLIPIDEMIA: ICD-10-CM

## 2022-06-08 DIAGNOSIS — E11.29 TYPE 2 DIABETES MELLITUS WITH OTHER DIABETIC KIDNEY COMPLICATION: ICD-10-CM

## 2022-06-08 DIAGNOSIS — F41.9 ANXIETY DISORDER, UNSPECIFIED: ICD-10-CM

## 2022-06-14 ENCOUNTER — NON-APPOINTMENT (OUTPATIENT)
Age: 57
End: 2022-06-14

## 2022-06-14 ENCOUNTER — APPOINTMENT (OUTPATIENT)
Dept: OPHTHALMOLOGY | Facility: CLINIC | Age: 57
End: 2022-06-14
Payer: MEDICAID

## 2022-06-14 PROCEDURE — 92015 DETERMINE REFRACTIVE STATE: CPT | Mod: NC

## 2022-06-14 PROCEDURE — 92014 COMPRE OPH EXAM EST PT 1/>: CPT

## 2022-07-06 ENCOUNTER — RX RENEWAL (OUTPATIENT)
Age: 57
End: 2022-07-06

## 2022-07-19 ENCOUNTER — RX RENEWAL (OUTPATIENT)
Age: 57
End: 2022-07-19

## 2022-07-25 ENCOUNTER — RX RENEWAL (OUTPATIENT)
Age: 57
End: 2022-07-25

## 2022-08-15 ENCOUNTER — APPOINTMENT (OUTPATIENT)
Dept: INTERNAL MEDICINE | Facility: CLINIC | Age: 57
End: 2022-08-15

## 2022-08-15 ENCOUNTER — OUTPATIENT (OUTPATIENT)
Dept: OUTPATIENT SERVICES | Facility: HOSPITAL | Age: 57
LOS: 1 days | End: 2022-08-15

## 2022-08-15 VITALS
HEART RATE: 82 BPM | SYSTOLIC BLOOD PRESSURE: 150 MMHG | RESPIRATION RATE: 99 BRPM | HEIGHT: 63 IN | WEIGHT: 187.6 LBS | DIASTOLIC BLOOD PRESSURE: 80 MMHG | BODY MASS INDEX: 33.24 KG/M2

## 2022-08-15 DIAGNOSIS — Z98.89 OTHER SPECIFIED POSTPROCEDURAL STATES: Chronic | ICD-10-CM

## 2022-08-15 PROCEDURE — 99213 OFFICE O/P EST LOW 20 MIN: CPT | Mod: GE

## 2022-08-17 NOTE — PHYSICAL EXAM
[No Acute Distress] : no acute distress [Well Nourished] : well nourished [Well Developed] : well developed [Well-Appearing] : well-appearing [Normal Sclera/Conjunctiva] : normal sclera/conjunctiva [PERRL] : pupils equal round and reactive to light [EOMI] : extraocular movements intact [Normal Outer Ear/Nose] : the outer ears and nose were normal in appearance [Normal Oropharynx] : the oropharynx was normal [No JVD] : no jugular venous distention [Supple] : supple [Thyroid Normal, No Nodules] : the thyroid was normal and there were no nodules present [No Respiratory Distress] : no respiratory distress  [No Accessory Muscle Use] : no accessory muscle use [Clear to Auscultation] : lungs were clear to auscultation bilaterally [Normal Rate] : normal rate  [Regular Rhythm] : with a regular rhythm [Normal S1, S2] : normal S1 and S2 [No Murmur] : no murmur heard [No Varicosities] : no varicosities [Pedal Pulses Present] : the pedal pulses are present [No Edema] : there was no peripheral edema [No Extremity Clubbing/Cyanosis] : no extremity clubbing/cyanosis [Soft] : abdomen soft [Non Tender] : non-tender [Non-distended] : non-distended [No Masses] : no abdominal mass palpated [No HSM] : no HSM [Normal Bowel Sounds] : normal bowel sounds [No CVA Tenderness] : no CVA  tenderness [No Spinal Tenderness] : no spinal tenderness [No Joint Swelling] : no joint swelling [Grossly Normal Strength/Tone] : grossly normal strength/tone [No Rash] : no rash [Coordination Grossly Intact] : coordination grossly intact [No Focal Deficits] : no focal deficits [Normal Gait] : normal gait [Deep Tendon Reflexes (DTR)] : deep tendon reflexes were 2+ and symmetric [Normal Affect] : the affect was normal [Normal Insight/Judgement] : insight and judgment were intact

## 2022-08-19 NOTE — ADDENDUM
[FreeTextEntry1] : Case discussed with Dr. MODESTA Hoffman \par Total time of encounter 30 min\par RTC in 5-10 weeks\par \par \par Vani Ding, PGY-2\par [employer]\par

## 2022-08-19 NOTE — REVIEW OF SYSTEMS
[Fatigue] : fatigue [Headache] : headache [Anxiety] : anxiety [Fever] : no fever [Chills] : no chills [Night Sweats] : no night sweats [Discharge] : no discharge [Pain] : no pain [Vision Problems] : no vision problems [Itching] : no itching [Earache] : no earache [Hearing Loss] : no hearing loss [Nasal Discharge] : no nasal discharge [Sore Throat] : no sore throat [Chest Pain] : no chest pain [Palpitations] : no palpitations [Leg Claudication] : no leg claudication [Orthopnea] : no orthopnea [Shortness Of Breath] : no shortness of breath [Wheezing] : no wheezing [Cough] : no cough [Dyspnea on Exertion] : no dyspnea on exertion [Abdominal Pain] : no abdominal pain [Nausea] : no nausea [Vomiting] : no vomiting [Dysuria] : no dysuria [Incontinence] : no incontinence [Hematuria] : no hematuria [Frequency] : no frequency [Joint Pain] : no joint pain [Joint Stiffness] : no joint stiffness [Joint Swelling] : no joint swelling [Muscle Weakness] : no muscle weakness [Muscle Pain] : no muscle pain [Back Pain] : no back pain [Itching] : no itching [Dizziness] : no dizziness [Fainting] : no fainting [Confusion] : no confusion

## 2022-08-19 NOTE — HISTORY OF PRESENT ILLNESS
[de-identified] : 56F w/ PMH of HTN, DM2 (a1c 6.6 -6/6/22), hepatic steatosis, stage 1 obesity, s/p cholecystectomy 1/22 presenting for follow up for hypertension. At the last visit the patient's blood pressure was elevated and she was counseled on medication adherence, diet and exercise. At this visit she endorses tension headaches associated with elevated blood pressure, however she does not measure her blood pressure regularly. She also states she sleeps 4-5 hours, wakes up during sleep and does not feel well rested after sleeping. \par Last visit she had a work up for palpitations which was inconclusive and she states she stopped taking the metoprolol, she feels like her palpitations have resolved. For exercise she walks 1.5 miles 2-3 weeks, she doesn’t think her diet "is too bad" she tries to avoid rice, she has has a bagel once a month, she has ginger ale here and there, she doesn't eat much sugar. She says that she is stressed due to work (she's a ) and her children and she would like to speak to someone.

## 2022-08-22 ENCOUNTER — TRANSCRIPTION ENCOUNTER (OUTPATIENT)
Age: 57
End: 2022-08-22

## 2022-08-23 DIAGNOSIS — F41.9 ANXIETY DISORDER, UNSPECIFIED: ICD-10-CM

## 2022-08-23 DIAGNOSIS — I10 ESSENTIAL (PRIMARY) HYPERTENSION: ICD-10-CM

## 2022-08-23 DIAGNOSIS — Z76.89 PERSONS ENCOUNTERING HEALTH SERVICES IN OTHER SPECIFIED CIRCUMSTANCES: ICD-10-CM

## 2022-08-23 DIAGNOSIS — R51.9 HEADACHE, UNSPECIFIED: ICD-10-CM

## 2022-08-23 DIAGNOSIS — R00.2 PALPITATIONS: ICD-10-CM

## 2022-08-23 DIAGNOSIS — E11.29 TYPE 2 DIABETES MELLITUS WITH OTHER DIABETIC KIDNEY COMPLICATION: ICD-10-CM

## 2022-09-07 NOTE — PRE-OP CHECKLIST - SIDE RAILS UP
Please rest   Use lidocaine patches as discussed in the emergency department  Please use Tylenol alternating ibuprofen home as needed pain control  Please continue to use Tessalon Perles to help with cough  Please return with any new or worsening symptoms  As we discussed there was new pulmonary nodule seen on your CT scan today and is recommended that you discuss this with your pulmonologist  CTA ED chest PE Study   Final Result      No PE identified  No acute change compared to 2 hours ago  Workstation performed: [id number]         CT chest without contrast   Final Result      No acute findings are identified  Several new, subcentimeter pulmonary nodules compared to 2021, largest 0 6 cm  Follow-up CT should be considered in 12 months           Workstation performed: [id number]
done

## 2022-10-18 ENCOUNTER — RX RENEWAL (OUTPATIENT)
Age: 57
End: 2022-10-18

## 2022-10-19 ENCOUNTER — RX RENEWAL (OUTPATIENT)
Age: 57
End: 2022-10-19

## 2022-10-21 ENCOUNTER — NON-APPOINTMENT (OUTPATIENT)
Age: 57
End: 2022-10-21

## 2022-11-28 ENCOUNTER — OUTPATIENT (OUTPATIENT)
Dept: OUTPATIENT SERVICES | Facility: HOSPITAL | Age: 57
LOS: 1 days | End: 2022-11-28

## 2022-11-28 ENCOUNTER — APPOINTMENT (OUTPATIENT)
Dept: INTERNAL MEDICINE | Facility: CLINIC | Age: 57
End: 2022-11-28

## 2022-11-28 VITALS
OXYGEN SATURATION: 98 % | HEART RATE: 81 BPM | BODY MASS INDEX: 34.73 KG/M2 | DIASTOLIC BLOOD PRESSURE: 80 MMHG | SYSTOLIC BLOOD PRESSURE: 160 MMHG | HEIGHT: 63 IN | RESPIRATION RATE: 16 BRPM | WEIGHT: 196 LBS

## 2022-11-28 DIAGNOSIS — Z23 ENCOUNTER FOR IMMUNIZATION: ICD-10-CM

## 2022-11-28 DIAGNOSIS — I10 ESSENTIAL (PRIMARY) HYPERTENSION: ICD-10-CM

## 2022-11-28 DIAGNOSIS — Z87.898 PERSONAL HISTORY OF OTHER SPECIFIED CONDITIONS: ICD-10-CM

## 2022-11-28 DIAGNOSIS — E11.29 TYPE 2 DIABETES MELLITUS WITH OTHER DIABETIC KIDNEY COMPLICATION: ICD-10-CM

## 2022-11-28 DIAGNOSIS — M25.512 PAIN IN LEFT SHOULDER: ICD-10-CM

## 2022-11-28 DIAGNOSIS — E66.9 OBESITY, UNSPECIFIED: ICD-10-CM

## 2022-11-28 DIAGNOSIS — Z98.89 OTHER SPECIFIED POSTPROCEDURAL STATES: Chronic | ICD-10-CM

## 2022-11-28 DIAGNOSIS — F41.9 ANXIETY DISORDER, UNSPECIFIED: ICD-10-CM

## 2022-11-28 PROCEDURE — 99214 OFFICE O/P EST MOD 30 MIN: CPT | Mod: GC

## 2022-11-28 NOTE — ED ADULT NURSE NOTE - ALCOHOL PRE SCREEN (AUDIT - C)
-If your child's results are positive, nurse practitioner will call you to discuss  -Use Debrox he can buy at [facility] or another pharmacy for earwax.  -Avoid using Q-tips  -Use Tylenol and or Motrin for pain per dosing guidelines    Return to the emergency department for worsening symptoms or new concerns    
Statement Selected

## 2022-12-05 ENCOUNTER — TRANSCRIPTION ENCOUNTER (OUTPATIENT)
Age: 57
End: 2022-12-05

## 2022-12-06 ENCOUNTER — APPOINTMENT (OUTPATIENT)
Dept: MAMMOGRAPHY | Facility: CLINIC | Age: 57
End: 2022-12-06

## 2022-12-06 ENCOUNTER — OUTPATIENT (OUTPATIENT)
Dept: OUTPATIENT SERVICES | Facility: HOSPITAL | Age: 57
LOS: 1 days | End: 2022-12-06
Payer: MEDICAID

## 2022-12-06 DIAGNOSIS — Z00.8 ENCOUNTER FOR OTHER GENERAL EXAMINATION: ICD-10-CM

## 2022-12-06 DIAGNOSIS — Z00.00 ENCOUNTER FOR GENERAL ADULT MEDICAL EXAMINATION WITHOUT ABNORMAL FINDINGS: ICD-10-CM

## 2022-12-06 DIAGNOSIS — Z98.89 OTHER SPECIFIED POSTPROCEDURAL STATES: Chronic | ICD-10-CM

## 2022-12-06 PROCEDURE — 77067 SCR MAMMO BI INCL CAD: CPT

## 2022-12-06 PROCEDURE — 77063 BREAST TOMOSYNTHESIS BI: CPT | Mod: 26

## 2022-12-06 PROCEDURE — 77063 BREAST TOMOSYNTHESIS BI: CPT

## 2022-12-06 PROCEDURE — 77067 SCR MAMMO BI INCL CAD: CPT | Mod: 26

## 2022-12-08 ENCOUNTER — TRANSCRIPTION ENCOUNTER (OUTPATIENT)
Age: 57
End: 2022-12-08

## 2022-12-13 ENCOUNTER — TRANSCRIPTION ENCOUNTER (OUTPATIENT)
Age: 57
End: 2022-12-13

## 2022-12-15 PROBLEM — Z87.898 HISTORY OF POSTNASAL DRIP: Status: RESOLVED | Noted: 2017-08-23 | Resolved: 2022-12-15

## 2022-12-15 NOTE — PHYSICAL EXAM
[No Acute Distress] : no acute distress [Well Nourished] : well nourished [Well Developed] : well developed [Well-Appearing] : well-appearing [Normal Sclera/Conjunctiva] : normal sclera/conjunctiva [PERRL] : pupils equal round and reactive to light [EOMI] : extraocular movements intact [Normal Outer Ear/Nose] : the outer ears and nose were normal in appearance [Normal Oropharynx] : the oropharynx was normal [No JVD] : no jugular venous distention [Supple] : supple [No Respiratory Distress] : no respiratory distress  [No Accessory Muscle Use] : no accessory muscle use [Clear to Auscultation] : lungs were clear to auscultation bilaterally [Normal Rate] : normal rate  [Regular Rhythm] : with a regular rhythm [Normal S1, S2] : normal S1 and S2 [No Murmur] : no murmur heard [No Varicosities] : no varicosities [No Edema] : there was no peripheral edema [No Extremity Clubbing/Cyanosis] : no extremity clubbing/cyanosis [Soft] : abdomen soft [Non Tender] : non-tender [Non-distended] : non-distended [No HSM] : no HSM [No CVA Tenderness] : no CVA  tenderness [No Spinal Tenderness] : no spinal tenderness [No Joint Swelling] : no joint swelling [No Rash] : no rash [Coordination Grossly Intact] : coordination grossly intact [No Focal Deficits] : no focal deficits [Normal Gait] : normal gait [Normal Affect] : the affect was normal [Normal Insight/Judgement] : insight and judgment were intact [de-identified] : Increased pain on abduction of left shoulder >90 degrees, pain on palpation of left shoulder, 4/5 strength

## 2022-12-15 NOTE — REVIEW OF SYSTEMS
[Joint Pain] : joint pain [Back Pain] : back pain [Anxiety] : anxiety [Negative] : Heme/Lymph [Suicidal] : not suicidal [FreeTextEntry9] : p

## 2022-12-15 NOTE — HISTORY OF PRESENT ILLNESS
[FreeTextEntry8] : 56F w/ PMH of HTN, DM2 (a1c 6.6 -6/6/22), hepatic steatosis, stage 1 obesity, s/p cholecystectomy 1/22 presenting for follow up for up of chronic conditions. She states that last week she lifted a heavy heater with her left arm, after which she developed left sided arm pain. She had no associated N/V/Chest pain/radiation. She was concerned it could have been her heart so she took a baby aspirin which provided relief and went to urgent care where her EKG was wnl. \par \par Additionally she reports recording her blood pressure at home, however she ran out of blood pressure medication at the end of October and has not been able to obtain refills. She states while she is taking her blood pressure medications her levels range from 130-140 systolic.  She also states she sleeps 4-5 hours, wakes up during sleep and does not feel well rested after sleeping. \par \par For exercise she tries to walk however it is hard given her busy schedule. With regards to her diet she tries to avoid rice, she has has a bagel once a month, she drinks ginger ale. She thinks she may eat too many candies/fruit snacks and is working on cutting them down\par She says that she is stressed due to work (she's a ) and her children and she would like to speak to someone. \par

## 2022-12-15 NOTE — ADDENDUM
[FreeTextEntry1] : Case discussed with Dr. Lang\par Total time of encounter 30 min\par RTC in 5 weeks for televisit (BP)\par RTC in 3 months for DM\par \par \par Vani Ding, PGY-2\par [employer]\par

## 2023-01-03 ENCOUNTER — APPOINTMENT (OUTPATIENT)
Dept: INTERNAL MEDICINE | Facility: CLINIC | Age: 58
End: 2023-01-03
Payer: MEDICAID

## 2023-01-03 ENCOUNTER — OUTPATIENT (OUTPATIENT)
Dept: OUTPATIENT SERVICES | Facility: HOSPITAL | Age: 58
LOS: 1 days | End: 2023-01-03

## 2023-01-03 VITALS — BODY MASS INDEX: 34.73 KG/M2 | WEIGHT: 196 LBS | HEIGHT: 63 IN

## 2023-01-03 DIAGNOSIS — Z98.89 OTHER SPECIFIED POSTPROCEDURAL STATES: Chronic | ICD-10-CM

## 2023-01-03 LAB
ESTIMATED AVERAGE GLUCOSE: 140 MG/DL
HBA1C MFR BLD HPLC: 6.5 %

## 2023-01-03 PROCEDURE — ZZZZZ: CPT

## 2023-01-05 NOTE — HISTORY OF PRESENT ILLNESS
[Home] : at home, [unfilled] , at the time of the visit. [Medical Office: ([facility])___] : at the medical office located in  [FreeTextEntry1] : BP check [de-identified] : 56F w/ PMH of HTN, DM2 (a1c 6.6 -6/6/22), hepatic steatosis, stage 1 obesity, s/p cholecystectomy 1/22 presenting for televisit for BP. \par \par She states she has been taking her medications as prescribed: amlodipine 10, hydrochlorothiazide 12.5, and lisinopril 40. She usually takes her BP first thing in the morning. It tends to run in the 130s systolic over 80s diastolic. She endorses current back pain that causes her blood pressure to rise to the 150s systolic. She denies any episodes of dizziness, falls, loss of consciousness or headaches. \par \par She states her back pain is chronic, has been getting worse over the past two weeks. She states sometimes the pain radiates down her legs, but she denies numbness/tingling or loss of control of bowel movements/urination. She takes naproxen 2x day, with relief of her symptoms. She is supposed to follow with PT, and plans on calling for an appt. \par \par For her anxiety she had an intake call with out behavioral health services, and is waiting for a call back. \par

## 2023-01-05 NOTE — ADDENDUM
[FreeTextEntry1] : Case discussed with Dr. Gonzalez\par Total time of encounter 30 min\par RTC in 5 weeks\par \par Vani Ding, PGY-2\par [employer]\par

## 2023-01-06 DIAGNOSIS — M54.50 LOW BACK PAIN, UNSPECIFIED: ICD-10-CM

## 2023-01-06 DIAGNOSIS — I10 ESSENTIAL (PRIMARY) HYPERTENSION: ICD-10-CM

## 2023-01-06 DIAGNOSIS — F41.9 ANXIETY DISORDER, UNSPECIFIED: ICD-10-CM

## 2023-01-09 ENCOUNTER — TRANSCRIPTION ENCOUNTER (OUTPATIENT)
Age: 58
End: 2023-01-09

## 2023-01-18 ENCOUNTER — RX RENEWAL (OUTPATIENT)
Age: 58
End: 2023-01-18

## 2023-02-01 ENCOUNTER — OUTPATIENT (OUTPATIENT)
Dept: OUTPATIENT SERVICES | Facility: HOSPITAL | Age: 58
LOS: 1 days | End: 2023-02-01

## 2023-02-01 ENCOUNTER — APPOINTMENT (OUTPATIENT)
Dept: INTERNAL MEDICINE | Facility: CLINIC | Age: 58
End: 2023-02-01

## 2023-02-01 DIAGNOSIS — Z98.89 OTHER SPECIFIED POSTPROCEDURAL STATES: Chronic | ICD-10-CM

## 2023-02-02 DIAGNOSIS — E11.29 TYPE 2 DIABETES MELLITUS WITH OTHER DIABETIC KIDNEY COMPLICATION: ICD-10-CM

## 2023-02-03 ENCOUNTER — NON-APPOINTMENT (OUTPATIENT)
Age: 58
End: 2023-02-03

## 2023-02-06 ENCOUNTER — NON-APPOINTMENT (OUTPATIENT)
Age: 58
End: 2023-02-06

## 2023-02-06 LAB
M TB IFN-G BLD-IMP: NEGATIVE
QUANTIFERON TB PLUS MITOGEN MINUS NIL: 5.33 IU/ML
QUANTIFERON TB PLUS NIL: 0.02 IU/ML
QUANTIFERON TB PLUS TB1 MINUS NIL: 0 IU/ML
QUANTIFERON TB PLUS TB2 MINUS NIL: 0.01 IU/ML

## 2023-02-09 ENCOUNTER — APPOINTMENT (OUTPATIENT)
Dept: INTERNAL MEDICINE | Facility: CLINIC | Age: 58
End: 2023-02-09

## 2023-03-01 ENCOUNTER — APPOINTMENT (OUTPATIENT)
Dept: INTERNAL MEDICINE | Facility: CLINIC | Age: 58
End: 2023-03-01

## 2023-03-01 ENCOUNTER — OUTPATIENT (OUTPATIENT)
Dept: OUTPATIENT SERVICES | Facility: HOSPITAL | Age: 58
LOS: 1 days | End: 2023-03-01

## 2023-03-01 DIAGNOSIS — Z98.89 OTHER SPECIFIED POSTPROCEDURAL STATES: Chronic | ICD-10-CM

## 2023-03-02 ENCOUNTER — APPOINTMENT (OUTPATIENT)
Dept: PULMONOLOGY | Facility: CLINIC | Age: 58
End: 2023-03-02

## 2023-03-02 DIAGNOSIS — Z23 ENCOUNTER FOR IMMUNIZATION: ICD-10-CM

## 2023-03-06 ENCOUNTER — RX RENEWAL (OUTPATIENT)
Age: 58
End: 2023-03-06

## 2023-03-13 ENCOUNTER — APPOINTMENT (OUTPATIENT)
Dept: INTERNAL MEDICINE | Facility: CLINIC | Age: 58
End: 2023-03-13

## 2023-03-16 ENCOUNTER — APPOINTMENT (OUTPATIENT)
Dept: INTERNAL MEDICINE | Facility: CLINIC | Age: 58
End: 2023-03-16

## 2023-03-16 ENCOUNTER — APPOINTMENT (OUTPATIENT)
Dept: INTERNAL MEDICINE | Facility: CLINIC | Age: 58
End: 2023-03-16
Payer: MEDICAID

## 2023-03-16 ENCOUNTER — OUTPATIENT (OUTPATIENT)
Dept: OUTPATIENT SERVICES | Facility: HOSPITAL | Age: 58
LOS: 1 days | End: 2023-03-16

## 2023-03-16 VITALS
OXYGEN SATURATION: 97 % | DIASTOLIC BLOOD PRESSURE: 80 MMHG | BODY MASS INDEX: 34.27 KG/M2 | TEMPERATURE: 98 F | WEIGHT: 193.38 LBS | HEART RATE: 84 BPM | HEIGHT: 63 IN | SYSTOLIC BLOOD PRESSURE: 138 MMHG | RESPIRATION RATE: 16 BRPM

## 2023-03-16 DIAGNOSIS — E11.29 TYPE 2 DIABETES MELLITUS WITH OTHER DIABETIC KIDNEY COMPLICATION: ICD-10-CM

## 2023-03-16 DIAGNOSIS — R73.03 PREDIABETES.: ICD-10-CM

## 2023-03-16 DIAGNOSIS — Z98.89 OTHER SPECIFIED POSTPROCEDURAL STATES: Chronic | ICD-10-CM

## 2023-03-16 PROCEDURE — 99214 OFFICE O/P EST MOD 30 MIN: CPT | Mod: GC

## 2023-03-16 RX ORDER — SITAGLIPTIN 100 MG/1
100 TABLET, FILM COATED ORAL
Qty: 90 | Refills: 3 | Status: DISCONTINUED | COMMUNITY
Start: 2021-02-03 | End: 2023-03-16

## 2023-03-17 DIAGNOSIS — I10 ESSENTIAL (PRIMARY) HYPERTENSION: ICD-10-CM

## 2023-03-17 DIAGNOSIS — E11.29 TYPE 2 DIABETES MELLITUS WITH OTHER DIABETIC KIDNEY COMPLICATION: ICD-10-CM

## 2023-03-17 DIAGNOSIS — K76.0 FATTY (CHANGE OF) LIVER, NOT ELSEWHERE CLASSIFIED: ICD-10-CM

## 2023-03-17 DIAGNOSIS — R51.9 HEADACHE, UNSPECIFIED: ICD-10-CM

## 2023-03-17 LAB
ALBUMIN SERPL ELPH-MCNC: 4.2 G/DL
ALP BLD-CCNC: 90 U/L
ALT SERPL-CCNC: 19 U/L
ANION GAP SERPL CALC-SCNC: 14 MMOL/L
AST SERPL-CCNC: 22 U/L
BILIRUB SERPL-MCNC: 0.6 MG/DL
BUN SERPL-MCNC: 15 MG/DL
CALCIUM SERPL-MCNC: 9.8 MG/DL
CHLORIDE SERPL-SCNC: 98 MMOL/L
CO2 SERPL-SCNC: 30 MMOL/L
CREAT SERPL-MCNC: 0.97 MG/DL
EGFR: 67 ML/MIN/1.73M2
ESTIMATED AVERAGE GLUCOSE: 148 MG/DL
GLUCOSE SERPL-MCNC: 133 MG/DL
HBA1C MFR BLD HPLC: 6.8 %
MAGNESIUM SERPL-MCNC: 1.8 MG/DL
POTASSIUM SERPL-SCNC: 4 MMOL/L
PROT SERPL-MCNC: 7.8 G/DL
SODIUM SERPL-SCNC: 142 MMOL/L

## 2023-03-17 NOTE — ASSESSMENT
[FreeTextEntry1] : 58F w/ PMH of HTN, DM2 (a1c 6.6 -6/6/22), hepatic steatosis, stage 1 obesity, s/p cholecystectomy 1/22 presenting for HTN and DM f/u.

## 2023-03-17 NOTE — PLAN
[FreeTextEntry1] : RTC in 5 weeks for tele apt (or in person since needs Hep B vaccine anyways then) to see if tolerating Ozempic and ready for increase to higher dose. \par \par Needs f/u of BMP drawn today to see if can increase HCTZ to 25mg qd, as long as K is wnl. \par \par D/w Dr. Balderas\par \par Marcus Barnard PGY-2\par [employer] [facility]

## 2023-03-17 NOTE — HISTORY OF PRESENT ILLNESS
[FreeTextEntry1] : F/u (Diabetes and HTN) [de-identified] : 58F w/ PMH of HTN, DM2 (a1c 6.6 -6/6/22), hepatic steatosis, stage 1 obesity, s/p cholecystectomy 1/22 presenting for HTN and DM f/u.\par \par HTN: Patient reports she is checking her BP, usually in 130s systolic, though is sometimes in 140s. Of note has had headache past few days, BP in 140s. Relieved w tylenol, posterior occipital bilateral as well as band-like. Patient also reporting decreased water intake from her baseline. Patient taking her lisinopril 40, amlodipine 10, and HCTZ 12.5. Here for BMP check. \par \par DM: Patient reports she was looking into side effects of medications and doesn't want to be on Januvia. Explained that this is likely not the best medications for her especially given her microalbuminuria. Last A1C was 6.5. Patient reports eating cautiously. \par \par Denies CP, SOB.

## 2023-03-29 ENCOUNTER — RX RENEWAL (OUTPATIENT)
Age: 58
End: 2023-03-29

## 2023-04-21 ENCOUNTER — APPOINTMENT (OUTPATIENT)
Dept: INTERNAL MEDICINE | Facility: CLINIC | Age: 58
End: 2023-04-21

## 2023-04-28 ENCOUNTER — APPOINTMENT (OUTPATIENT)
Dept: INTERNAL MEDICINE | Facility: CLINIC | Age: 58
End: 2023-04-28

## 2023-04-28 ENCOUNTER — APPOINTMENT (OUTPATIENT)
Dept: INTERNAL MEDICINE | Facility: CLINIC | Age: 58
End: 2023-04-28
Payer: MEDICAID

## 2023-04-28 ENCOUNTER — OUTPATIENT (OUTPATIENT)
Dept: OUTPATIENT SERVICES | Facility: HOSPITAL | Age: 58
LOS: 1 days | End: 2023-04-28

## 2023-04-28 VITALS
HEIGHT: 63 IN | BODY MASS INDEX: 33.31 KG/M2 | RESPIRATION RATE: 16 BRPM | TEMPERATURE: 97.2 F | WEIGHT: 188 LBS | DIASTOLIC BLOOD PRESSURE: 78 MMHG | SYSTOLIC BLOOD PRESSURE: 136 MMHG | OXYGEN SATURATION: 99 % | HEART RATE: 81 BPM

## 2023-04-28 VITALS — SYSTOLIC BLOOD PRESSURE: 125 MMHG | DIASTOLIC BLOOD PRESSURE: 80 MMHG

## 2023-04-28 PROCEDURE — 99214 OFFICE O/P EST MOD 30 MIN: CPT | Mod: GC

## 2023-04-28 RX ORDER — POLYETHYLENE GLYCOL 3350 17 G/17G
17 POWDER, FOR SOLUTION ORAL DAILY
Qty: 90 | Refills: 3 | Status: ACTIVE | COMMUNITY
Start: 2023-04-28 | End: 1900-01-01

## 2023-05-03 NOTE — HISTORY OF PRESENT ILLNESS
[de-identified] : 58F w/ PMH of HTN, DM2 (a1c 6.6 -6/6/22), hepatic steatosis, stage 1 obesity, s/p cholecystectomy 1/22 presenting for followup after starting ozempic 1 month ago. Patient reports tolerating minimum dosage of ozempic (0.25mg weekly). Reports 7 pound weight loss in one month. Patient also had recently increased HCTZ dose from 12.5 mg to 25 mg with improved blood pressures (SBP 130s at home).

## 2023-05-03 NOTE — REVIEW OF SYSTEMS
[Palpitations] : palpitations [Constipation] : constipation [Negative] : Musculoskeletal [Chest Pain] : no chest pain [Leg Claudication] : no leg claudication [Lower Ext Edema] : no lower extremity edema [Abdominal Pain] : no abdominal pain [Nausea] : no nausea [Diarrhea] : diarrhea [Vomiting] : no vomiting [Heartburn] : no heartburn [FreeTextEntry5] : Occasional palpitations

## 2023-05-03 NOTE — ASSESSMENT
[FreeTextEntry1] : 58F w/ PMH of HTN, DM2 (a1c 6.6 -6/6/22), hepatic steatosis, stage 1 obesity, s/p cholecystectomy 1/22 presenting for followup after starting ozempic 1 month ago, tolerating well. \par \par Case Discussed with Dr. Balderas\par \par Diane Rojas PGY1\par [employer] [facility]

## 2023-05-04 DIAGNOSIS — I10 ESSENTIAL (PRIMARY) HYPERTENSION: ICD-10-CM

## 2023-05-04 DIAGNOSIS — E87.6 HYPOKALEMIA: ICD-10-CM

## 2023-05-04 DIAGNOSIS — E66.9 OBESITY, UNSPECIFIED: ICD-10-CM

## 2023-05-04 DIAGNOSIS — E11.29 TYPE 2 DIABETES MELLITUS WITH OTHER DIABETIC KIDNEY COMPLICATION: ICD-10-CM

## 2023-05-12 ENCOUNTER — APPOINTMENT (OUTPATIENT)
Dept: INTERNAL MEDICINE | Facility: CLINIC | Age: 58
End: 2023-05-12
Payer: MEDICAID

## 2023-05-12 ENCOUNTER — OUTPATIENT (OUTPATIENT)
Dept: OUTPATIENT SERVICES | Facility: HOSPITAL | Age: 58
LOS: 1 days | End: 2023-05-12

## 2023-05-12 ENCOUNTER — NON-APPOINTMENT (OUTPATIENT)
Age: 58
End: 2023-05-12

## 2023-05-12 VITALS
TEMPERATURE: 97.7 F | HEIGHT: 63 IN | SYSTOLIC BLOOD PRESSURE: 126 MMHG | RESPIRATION RATE: 16 BRPM | WEIGHT: 186 LBS | BODY MASS INDEX: 32.96 KG/M2 | OXYGEN SATURATION: 97 % | HEART RATE: 91 BPM | DIASTOLIC BLOOD PRESSURE: 78 MMHG

## 2023-05-12 DIAGNOSIS — F32.A DEPRESSION, UNSPECIFIED: ICD-10-CM

## 2023-05-12 DIAGNOSIS — K21.9 GASTRO-ESOPHAGEAL REFLUX DISEASE W/OUT ESOPHAGITIS: ICD-10-CM

## 2023-05-12 DIAGNOSIS — Z98.89 OTHER SPECIFIED POSTPROCEDURAL STATES: Chronic | ICD-10-CM

## 2023-05-12 PROCEDURE — 99214 OFFICE O/P EST MOD 30 MIN: CPT | Mod: GC

## 2023-05-13 NOTE — HEALTH RISK ASSESSMENT
[3] : 1) Little interest or pleasure doing things for nearly every day (3) [1] : 2) Feeling down, depressed, or hopeless for several days (1) [PHQ-2 Positive] : PHQ-2 Positive [Nearly Every Day (3)] : 1.) Little interest or pleasure in doing things? Nearly every day [1/2 of Days or More (2)] : 5.) Poor appetite or overeating? Half the days or more [Not at All (0)] : 6.) Feeling bad about yourself, or that you are a failure, or have let yourself or your family down? Not at all [Several Days (1)] : 8.) Moving or speaking so slowly that other people could have noticed, or the opposite, moving or speaking faster than usual? Several days [Mild] : severity of depression is mild [Somewhat Difficult] : How difficult have these problems made it for you to do your work, take care of things at home, or get along with people? Somewhat difficult [PHQ-9 Positive] : PHQ-9 Positive [[id number]] : 4 [[id number]] : 9

## 2023-05-13 NOTE — HISTORY OF PRESENT ILLNESS
[FreeTextEntry8] : Patient is a 56 year-old female with HTN, T2DM (a1c 6.8 in Mar 2023), hepatic steatosis, stage 1 obesity, and history of cholecystectomy (2022) presenting for an acute visit for left-sided chest pain as well as memory difficulties. \par \par Patient reports that it initially felt like left-sided medial breast pain. She went to urgent care to get it checked out. Breast exam was performed without any significant findings of mass nor lumps but she was recommended for a breast ultrasound. Breast ultrasound then performed without significant findings. Patient states the pain dissipated for a bit and returned but now notes an association with burping and almost gas-like sensation. Has had a metallic taste in mouth before and sometimes occurs when she hasn't eaten. Has had reflux in the past treated with PPI which helped. \par \par Patient also reports having memory difficulties, sometimes forgetting what she was doing or what she needed to do. Patient states that it does not interfere much with her ADLs or IADLs but is worried about memory decline. When asked further about other symptoms, patient does endorse concentration difficulties and loss of interest in normal activities when she is home. She has had appetite changes (but also in the context of starting ozempic) and also has had feelings of sadness. Notes that her daughter sees a therapist and has been having difficulties at home. Does not want to start new medications now but is willing to see a therapist.

## 2023-05-13 NOTE — ASSESSMENT
[FreeTextEntry1] : Patient is a 56 year-old female with HTN, T2DM (a1c 6.8 in Mar 2023), hepatic steatosis, stage 1 obesity, and history of cholecystectomy (2022) presenting for an acute visit for left-sided chest pain as well as memory difficulties. \par \par Assessment & Plan as above with additions below:\par \par #Healthcare maintenance: \par - Referral placed to [facility] for cervical cancer screening\par - Colonoscopy done in 2020\par - Mammogram done n 2022\par - Pneumo, COVID, Tdap UTD; needs Shingrix\par \par Follow-up in 6 weeks to assess improvement with pantoprazole as well as behavioral health referral. \par Case discussed with Dr. Hoffman.\par \par Santo Fernandez, PGY-3\par Internal Medicine, [employer] [facility]

## 2023-05-13 NOTE — REVIEW OF SYSTEMS
[Fatigue] : fatigue [Chest Pain] : chest pain [Heartburn] : heartburn [Joint Pain] : joint pain [Memory Loss] : memory loss [Anxiety] : anxiety [Depression] : depression [Fever] : no fever [Chills] : no chills [Recent Change In Weight] : ~T no recent weight change [Pain] : no pain [Vision Problems] : no vision problems [Hearing Loss] : no hearing loss [Sore Throat] : no sore throat [Palpitations] : no palpitations [Lower Ext Edema] : no lower extremity edema [Shortness Of Breath] : no shortness of breath [Wheezing] : no wheezing [Cough] : no cough [Dyspnea on Exertion] : no dyspnea on exertion [Abdominal Pain] : no abdominal pain [Nausea] : no nausea [Constipation] : no constipation [Diarrhea] : diarrhea [Dysuria] : no dysuria [Vomiting] : no vomiting [Hematuria] : no hematuria [Itching] : no itching [Joint Stiffness] : no joint stiffness [Skin Rash] : no skin rash [Headache] : no headache [Dizziness] : no dizziness [Suicidal] : not suicidal

## 2023-05-13 NOTE — PHYSICAL EXAM
[Normal Outer Ear/Nose] : the outer ears and nose were normal in appearance [Supple] : supple [Normal] : normal rate, regular rhythm, normal S1 and S2 and no murmur heard [No Edema] : there was no peripheral edema [Soft] : abdomen soft [Non Tender] : non-tender [Non-distended] : non-distended [Normal Bowel Sounds] : normal bowel sounds [No Joint Swelling] : no joint swelling [Grossly Normal Strength/Tone] : grossly normal strength/tone [No Rash] : no rash [Coordination Grossly Intact] : coordination grossly intact [No Focal Deficits] : no focal deficits [Normal Gait] : normal gait [Normal Affect] : the affect was normal [Normal Insight/Judgement] : insight and judgment were intact [de-identified] : AAOx4 [de-identified] : No sternal tenderness [de-identified] : Dysphoric mood

## 2023-05-16 DIAGNOSIS — R07.9 CHEST PAIN, UNSPECIFIED: ICD-10-CM

## 2023-05-16 DIAGNOSIS — F32.A DEPRESSION, UNSPECIFIED: ICD-10-CM

## 2023-05-16 DIAGNOSIS — K21.9 GASTRO-ESOPHAGEAL REFLUX DISEASE WITHOUT ESOPHAGITIS: ICD-10-CM

## 2023-05-16 DIAGNOSIS — R41.3 OTHER AMNESIA: ICD-10-CM

## 2023-05-16 DIAGNOSIS — I10 ESSENTIAL (PRIMARY) HYPERTENSION: ICD-10-CM

## 2023-05-26 ENCOUNTER — APPOINTMENT (OUTPATIENT)
Dept: INTERNAL MEDICINE | Facility: CLINIC | Age: 58
End: 2023-05-26

## 2023-06-02 ENCOUNTER — APPOINTMENT (OUTPATIENT)
Dept: INTERNAL MEDICINE | Facility: CLINIC | Age: 58
End: 2023-06-02
Payer: MEDICAID

## 2023-06-02 ENCOUNTER — OUTPATIENT (OUTPATIENT)
Dept: OUTPATIENT SERVICES | Facility: HOSPITAL | Age: 58
LOS: 1 days | End: 2023-06-02

## 2023-06-02 VITALS
WEIGHT: 177 LBS | HEART RATE: 68 BPM | BODY MASS INDEX: 31.36 KG/M2 | SYSTOLIC BLOOD PRESSURE: 150 MMHG | HEIGHT: 63 IN | DIASTOLIC BLOOD PRESSURE: 80 MMHG | OXYGEN SATURATION: 98 %

## 2023-06-02 DIAGNOSIS — Z98.89 OTHER SPECIFIED POSTPROCEDURAL STATES: Chronic | ICD-10-CM

## 2023-06-02 DIAGNOSIS — N95.1 MENOPAUSAL AND FEMALE CLIMACTERIC STATES: ICD-10-CM

## 2023-06-02 PROCEDURE — 99214 OFFICE O/P EST MOD 30 MIN: CPT

## 2023-06-09 DIAGNOSIS — N95.1 MENOPAUSAL AND FEMALE CLIMACTERIC STATES: ICD-10-CM

## 2023-06-09 NOTE — HISTORY OF PRESENT ILLNESS
[FreeTextEntry1] : breast pain and menopause  [de-identified] : PEDRO STRATTON is a 58 year old female with anxiety, asthma, HTN and prior h/o breast pain now resolved here for f/u and menopausal symptms \par \par Breast pain now resolved. had normal imaging. EKg was also wnl. \par \par breast mammo in 1 year\par ekg \par pap 2 yrs ago normal\par \par GYN HISTORY\par LMP:  post menopausal late 40s\par Age of first menstrual period: age \par \par SEXUAL HISTORY:\par Sexually active: no\par # partners: no\par Contraception: none at present. condoms in the past \par \par OBSTETRIC HISTORY: \par Pregnancy: \par APO: gestational HTN\par Breastfeeding: yes in the past\par PPD/PMAD: none\par \par PREVENTIVE CARE\par Last PAP: 2 yrs ago normal\par Last Breast cancer screening: dec 2022 birads 1\par Last colon cancer screening: utd\par Last osteoporosis screening: n/a\par HPV vaccine status: no\par \par MENOPAUSE SYMPTOMS\par MENOPAUSE SYMPTOMS\par \par Hot flashes: some\par Night Sweats: some\par Feeling tired: none\par Sleep problems: sometimes \par Concentration and memory changes: none\par headaches:none\par Skin changes: none\par \par Urinary symptoms (pain, burning, difficulty, nocturia, increase in frequency, frequent UTIs):  none\par Leaking urine, stool or gas: none\par Abdominal bloating: none\par Vaginal symptoms (dryness, itching or frequent infections): none\par Pain during sex: none\par Decreased desire or interest in sex: none\par Difficulty with orgasms: none\par \par Muscle or joint pain: none\par \par Breast changes: none\par \par Anxiety/ Depression/ Mood swings: some\par Irritability / Crying spells: none\par \par Impact on daily life: little \par \par

## 2023-06-28 ENCOUNTER — OUTPATIENT (OUTPATIENT)
Dept: OUTPATIENT SERVICES | Facility: HOSPITAL | Age: 58
LOS: 1 days | End: 2023-06-28

## 2023-06-28 ENCOUNTER — NON-APPOINTMENT (OUTPATIENT)
Age: 58
End: 2023-06-28

## 2023-06-28 ENCOUNTER — APPOINTMENT (OUTPATIENT)
Dept: INTERNAL MEDICINE | Facility: CLINIC | Age: 58
End: 2023-06-28
Payer: MEDICAID

## 2023-06-28 VITALS
DIASTOLIC BLOOD PRESSURE: 78 MMHG | RESPIRATION RATE: 16 BRPM | HEIGHT: 63 IN | TEMPERATURE: 97.5 F | BODY MASS INDEX: 31.05 KG/M2 | SYSTOLIC BLOOD PRESSURE: 135 MMHG | OXYGEN SATURATION: 98 % | HEART RATE: 86 BPM | WEIGHT: 175.25 LBS

## 2023-06-28 DIAGNOSIS — Z98.89 OTHER SPECIFIED POSTPROCEDURAL STATES: Chronic | ICD-10-CM

## 2023-06-28 PROCEDURE — 99214 OFFICE O/P EST MOD 30 MIN: CPT | Mod: GC

## 2023-06-28 RX ORDER — CETIRIZINE HYDROCHLORIDE AND PSEUDOEPHEDRINE HYDROCHLORIDE 5; 120 MG/1; MG/1
5-120 TABLET, FILM COATED, EXTENDED RELEASE ORAL DAILY
Qty: 5 | Refills: 0 | Status: ACTIVE | COMMUNITY
Start: 2023-06-28 | End: 1900-01-01

## 2023-06-28 NOTE — ADDENDUM
[FreeTextEntry1] : Case discussed with Dr. Balderas\par Total time of encounter 30 minutes\par RTC in 3 months\par \par Vani Ding, PGY-3\par [employer] [facility]\par

## 2023-06-28 NOTE — REVIEW OF SYSTEMS
[Negative] : Heme/Lymph [Cough] : cough [Fever] : no fever [Chills] : no chills [Fatigue] : no fatigue [Night Sweats] : no night sweats [Discharge] : no discharge [Pain] : no pain [Redness] : no redness [Palpitations] : no palpitations [Lower Ext Edema] : no lower extremity edema [Shortness Of Breath] : no shortness of breath [Wheezing] : no wheezing [Dyspnea on Exertion] : no dyspnea on exertion [Abdominal Pain] : no abdominal pain [Nausea] : no nausea [Constipation] : no constipation [Diarrhea] : diarrhea [Vomiting] : no vomiting [Heartburn] : no heartburn [Dysuria] : no dysuria [Joint Pain] : no joint pain [Muscle Pain] : no muscle pain [Back Pain] : no back pain

## 2023-06-28 NOTE — ASSESSMENT
[FreeTextEntry1] : 58F with HTN, T2DM (a1c 6.8 in Mar 2023), hepatic steatosis, stage 1 obesity, and history of cholecystectomy (2022) presenting for an acute visit for cough, likely 2/2 to post-nasal drip

## 2023-06-28 NOTE — HISTORY OF PRESENT ILLNESS
[FreeTextEntry8] : 58F with HTN, T2DM (a1c 6.8 in Mar 2023), hepatic steatosis, stage 1 obesity, and history of cholecystectomy (2022) presenting for an acute visit for cough. \par \par She has had an cough 3 weeks ago, which resolved and came back Thursday. Resulting in a sore throat,itchy throat,  hoarse voice. She took some cough medicine, however helped temporarily. The chronic cough is causing musculoskeletal chest pain \par \par At last visit patient was given omeprazole for epigastric pain relieved after burping, sometimes with metallic taste in mouth, no tenderness to palpation in sternal region. She was initiated on a PPI for one month, she states she has started taking omeprazole, and has had much improvement.\par \par For her asthma she has not been taking an inhaler, she has not had attacks. She has a losartan allergy, but denies previous cough with it and does not recall her allergy. She is taking her ozempic as prescribed and has lost 11 pounds. \par

## 2023-06-28 NOTE — PHYSICAL EXAM
[No Acute Distress] : no acute distress [Well Nourished] : well nourished [Well Developed] : well developed [Well-Appearing] : well-appearing [Normal Sclera/Conjunctiva] : normal sclera/conjunctiva [PERRL] : pupils equal round and reactive to light [EOMI] : extraocular movements intact [Normal Outer Ear/Nose] : the outer ears and nose were normal in appearance [Normal Oropharynx] : the oropharynx was normal [No Respiratory Distress] : no respiratory distress  [No Accessory Muscle Use] : no accessory muscle use [Clear to Auscultation] : lungs were clear to auscultation bilaterally [Normal Rate] : normal rate  [Regular Rhythm] : with a regular rhythm [Normal S1, S2] : normal S1 and S2 [No Murmur] : no murmur heard [Pedal Pulses Present] : the pedal pulses are present [No Edema] : there was no peripheral edema [No Extremity Clubbing/Cyanosis] : no extremity clubbing/cyanosis [Soft] : abdomen soft [Non Tender] : non-tender [Non-distended] : non-distended [No Masses] : no abdominal mass palpated [No HSM] : no HSM [Normal Bowel Sounds] : normal bowel sounds [No Joint Swelling] : no joint swelling [Grossly Normal Strength/Tone] : grossly normal strength/tone [No Rash] : no rash [Coordination Grossly Intact] : coordination grossly intact [No Focal Deficits] : no focal deficits [Normal Gait] : normal gait [Deep Tendon Reflexes (DTR)] : deep tendon reflexes were 2+ and symmetric [Normal Affect] : the affect was normal [Normal Insight/Judgement] : insight and judgment were intact [No Varicosities] : no varicosities [Normal Posterior Cervical Nodes] : no posterior cervical lymphadenopathy [Normal Anterior Cervical Nodes] : no anterior cervical lymphadenopathy

## 2023-06-29 LAB
ESTIMATED AVERAGE GLUCOSE: 131 MG/DL
HBA1C MFR BLD HPLC: 6.2 %

## 2023-06-30 DIAGNOSIS — E11.29 TYPE 2 DIABETES MELLITUS WITH OTHER DIABETIC KIDNEY COMPLICATION: ICD-10-CM

## 2023-06-30 DIAGNOSIS — J45.909 UNSPECIFIED ASTHMA, UNCOMPLICATED: ICD-10-CM

## 2023-06-30 DIAGNOSIS — E66.9 OBESITY, UNSPECIFIED: ICD-10-CM

## 2023-06-30 DIAGNOSIS — R05.9 COUGH, UNSPECIFIED: ICD-10-CM

## 2023-08-14 ENCOUNTER — RX RENEWAL (OUTPATIENT)
Age: 58
End: 2023-08-14

## 2023-10-10 ENCOUNTER — OUTPATIENT (OUTPATIENT)
Dept: OUTPATIENT SERVICES | Facility: HOSPITAL | Age: 58
LOS: 1 days | End: 2023-10-10

## 2023-10-10 ENCOUNTER — MED ADMIN CHARGE (OUTPATIENT)
Age: 58
End: 2023-10-10

## 2023-10-10 ENCOUNTER — APPOINTMENT (OUTPATIENT)
Dept: INTERNAL MEDICINE | Facility: CLINIC | Age: 58
End: 2023-10-10
Payer: MEDICAID

## 2023-10-10 VITALS
HEIGHT: 63 IN | HEART RATE: 82 BPM | SYSTOLIC BLOOD PRESSURE: 120 MMHG | BODY MASS INDEX: 28.88 KG/M2 | OXYGEN SATURATION: 97 % | WEIGHT: 163 LBS | DIASTOLIC BLOOD PRESSURE: 70 MMHG

## 2023-10-10 DIAGNOSIS — Z86.39 PERSONAL HISTORY OF OTHER ENDOCRINE, NUTRITIONAL AND METABOLIC DISEASE: ICD-10-CM

## 2023-10-10 DIAGNOSIS — Z87.898 PERSONAL HISTORY OF OTHER SPECIFIED CONDITIONS: ICD-10-CM

## 2023-10-10 DIAGNOSIS — Z98.89 OTHER SPECIFIED POSTPROCEDURAL STATES: Chronic | ICD-10-CM

## 2023-10-10 DIAGNOSIS — J45.998 OTHER ASTHMA: ICD-10-CM

## 2023-10-10 DIAGNOSIS — Z87.39 PERSONAL HISTORY OF OTHER DISEASES OF THE MUSCULOSKELETAL SYSTEM AND CONNECTIVE TISSUE: ICD-10-CM

## 2023-10-10 DIAGNOSIS — G43.909 MIGRAINE, UNSPECIFIED, NOT INTRACTABLE, W/OUT STATUS MIGRAINOSUS: ICD-10-CM

## 2023-10-10 DIAGNOSIS — R79.89 OTHER SPECIFIED ABNORMAL FINDINGS OF BLOOD CHEMISTRY: ICD-10-CM

## 2023-10-10 DIAGNOSIS — E66.9 OBESITY, UNSPECIFIED: ICD-10-CM

## 2023-10-10 PROCEDURE — 99213 OFFICE O/P EST LOW 20 MIN: CPT | Mod: GE

## 2023-10-11 DIAGNOSIS — I49.3 VENTRICULAR PREMATURE DEPOLARIZATION: ICD-10-CM

## 2023-10-11 DIAGNOSIS — E11.29 TYPE 2 DIABETES MELLITUS WITH OTHER DIABETIC KIDNEY COMPLICATION: ICD-10-CM

## 2023-10-11 DIAGNOSIS — F41.9 ANXIETY DISORDER, UNSPECIFIED: ICD-10-CM

## 2023-10-11 DIAGNOSIS — Z23 ENCOUNTER FOR IMMUNIZATION: ICD-10-CM

## 2023-10-11 DIAGNOSIS — L98.9 DISORDER OF THE SKIN AND SUBCUTANEOUS TISSUE, UNSPECIFIED: ICD-10-CM

## 2023-10-11 DIAGNOSIS — E78.49 OTHER HYPERLIPIDEMIA: ICD-10-CM

## 2023-10-11 DIAGNOSIS — I10 ESSENTIAL (PRIMARY) HYPERTENSION: ICD-10-CM

## 2023-10-11 LAB
ESTIMATED AVERAGE GLUCOSE: 117 MG/DL
HBA1C MFR BLD HPLC: 5.7 %

## 2023-10-19 ENCOUNTER — NON-APPOINTMENT (OUTPATIENT)
Age: 58
End: 2023-10-19

## 2023-10-19 ENCOUNTER — APPOINTMENT (OUTPATIENT)
Dept: OPHTHALMOLOGY | Facility: CLINIC | Age: 58
End: 2023-10-19
Payer: MEDICAID

## 2023-10-19 PROCEDURE — 92014 COMPRE OPH EXAM EST PT 1/>: CPT

## 2023-10-23 ENCOUNTER — RX RENEWAL (OUTPATIENT)
Age: 58
End: 2023-10-23

## 2023-11-17 ENCOUNTER — OUTPATIENT (OUTPATIENT)
Dept: OUTPATIENT SERVICES | Facility: HOSPITAL | Age: 58
LOS: 1 days | End: 2023-11-17

## 2023-11-17 ENCOUNTER — APPOINTMENT (OUTPATIENT)
Dept: INTERNAL MEDICINE | Facility: CLINIC | Age: 58
End: 2023-11-17
Payer: MEDICAID

## 2023-11-17 ENCOUNTER — APPOINTMENT (OUTPATIENT)
Dept: INTERNAL MEDICINE | Facility: CLINIC | Age: 58
End: 2023-11-17

## 2023-11-17 VITALS
OXYGEN SATURATION: 100 % | HEIGHT: 63 IN | BODY MASS INDEX: 28 KG/M2 | DIASTOLIC BLOOD PRESSURE: 87 MMHG | HEART RATE: 74 BPM | WEIGHT: 158 LBS | SYSTOLIC BLOOD PRESSURE: 152 MMHG

## 2023-11-17 DIAGNOSIS — F41.9 ANXIETY DISORDER, UNSPECIFIED: ICD-10-CM

## 2023-11-17 DIAGNOSIS — Z98.89 OTHER SPECIFIED POSTPROCEDURAL STATES: Chronic | ICD-10-CM

## 2023-11-17 DIAGNOSIS — Z92.89 PERSONAL HISTORY OF OTHER MEDICAL TREATMENT: ICD-10-CM

## 2023-11-17 DIAGNOSIS — M25.512 PAIN IN LEFT SHOULDER: ICD-10-CM

## 2023-11-17 DIAGNOSIS — Z87.898 PERSONAL HISTORY OF OTHER SPECIFIED CONDITIONS: ICD-10-CM

## 2023-11-17 DIAGNOSIS — Z00.00 ENCOUNTER FOR GENERAL ADULT MEDICAL EXAMINATION W/OUT ABNORMAL FINDINGS: ICD-10-CM

## 2023-11-17 DIAGNOSIS — J45.909 UNSPECIFIED ASTHMA, UNCOMPLICATED: ICD-10-CM

## 2023-11-17 DIAGNOSIS — E78.49 OTHER HYPERLIPIDEMIA: ICD-10-CM

## 2023-11-17 DIAGNOSIS — Z76.89 PERSONS ENCOUNTERING HEALTH SERVICES IN OTHER SPECIFIED CIRCUMSTANCES: ICD-10-CM

## 2023-11-17 DIAGNOSIS — R41.3 OTHER AMNESIA: ICD-10-CM

## 2023-11-17 DIAGNOSIS — I49.3 VENTRICULAR PREMATURE DEPOLARIZATION: ICD-10-CM

## 2023-11-17 DIAGNOSIS — Z23 ENCOUNTER FOR IMMUNIZATION: ICD-10-CM

## 2023-11-17 PROCEDURE — 99396 PREV VISIT EST AGE 40-64: CPT | Mod: GC

## 2023-11-17 RX ORDER — INFLUENZA VIRUS VACCINE 15; 15; 15; 15 UG/.5ML; UG/.5ML; UG/.5ML; UG/.5ML
SUSPENSION INTRAMUSCULAR
Qty: 1 | Refills: 0 | Status: COMPLETED | COMMUNITY
Start: 2022-11-28 | End: 2023-11-01

## 2023-11-17 RX ORDER — INFLUENZA VIRUS VACCINE 15; 15; 15; 15 UG/.5ML; UG/.5ML; UG/.5ML; UG/.5ML
SUSPENSION INTRAMUSCULAR
Qty: 1 | Refills: 0 | Status: COMPLETED | COMMUNITY
Start: 2021-10-04 | End: 1900-01-01

## 2023-11-20 LAB
HPV HIGH+LOW RISK DNA PNL CVX: NOT DETECTED
HPV HIGH+LOW RISK DNA PNL CVX: NOT DETECTED

## 2023-11-21 ENCOUNTER — NON-APPOINTMENT (OUTPATIENT)
Age: 58
End: 2023-11-21

## 2023-11-22 ENCOUNTER — NON-APPOINTMENT (OUTPATIENT)
Age: 58
End: 2023-11-22

## 2023-11-22 PROBLEM — Z92.89 HISTORY OF SCREENING MAMMOGRAPHY: Status: RESOLVED | Noted: 2020-07-10 | Resolved: 2021-10-04

## 2023-11-22 PROBLEM — Z23 ENCOUNTER FOR IMMUNIZATION: Status: RESOLVED | Noted: 2020-11-23 | Resolved: 2023-11-17

## 2023-11-22 LAB — CYTOLOGY CVX/VAG DOC THIN PREP: NORMAL

## 2023-11-24 ENCOUNTER — RX RENEWAL (OUTPATIENT)
Age: 58
End: 2023-11-24

## 2023-11-27 DIAGNOSIS — I49.3 VENTRICULAR PREMATURE DEPOLARIZATION: ICD-10-CM

## 2023-11-27 DIAGNOSIS — Z00.00 ENCOUNTER FOR GENERAL ADULT MEDICAL EXAMINATION WITHOUT ABNORMAL FINDINGS: ICD-10-CM

## 2023-11-27 DIAGNOSIS — E11.29 TYPE 2 DIABETES MELLITUS WITH OTHER DIABETIC KIDNEY COMPLICATION: ICD-10-CM

## 2023-11-27 DIAGNOSIS — N84.1 POLYP OF CERVIX UTERI: ICD-10-CM

## 2023-11-27 DIAGNOSIS — I10 ESSENTIAL (PRIMARY) HYPERTENSION: ICD-10-CM

## 2023-11-27 DIAGNOSIS — J45.909 UNSPECIFIED ASTHMA, UNCOMPLICATED: ICD-10-CM

## 2023-11-27 DIAGNOSIS — E78.49 OTHER HYPERLIPIDEMIA: ICD-10-CM

## 2023-12-12 ENCOUNTER — RX RENEWAL (OUTPATIENT)
Age: 58
End: 2023-12-12

## 2023-12-18 ENCOUNTER — RX RENEWAL (OUTPATIENT)
Age: 58
End: 2023-12-18

## 2023-12-20 NOTE — ASSESSMENT
[FreeTextEntry1] : #hcm Flu shot: will receive today BMI: 28.8, improved HIV: Negative 2017 Hep C: Negative 2016 COVID vaccination: UTD, pending booster Colonoscopy: January 2020 Tdap 2018 PNA 13/23 2018 Mammo Dec 2022 Pap: 2017, obtained today with me

## 2023-12-20 NOTE — HISTORY OF PRESENT ILLNESS
[de-identified] : 58F with HTN, T2DM (a1c 6.8 in Mar 2023), hepatic steatosis, stage 1 obesity, and history of cholecystectomy (2022) presenting for pap smear.   She is taking her Ozempic as prescribed and has lost about 20 pounds since initiating it. She also reports improved blood pressure control. She denies nausea, vomiting or diarrhea. She reports increasing exercise and participates in a pump it up class every Wednesday. She started to incorporate some of the exercises at home as well. Since last visit she was able to see ophthalmology   She has two skin lesions, one on her right forehead and one on her left neck. They have not increased in size or changed colors however are itchy. She was unable to see dermatology since last visit. She is planned for ____   She has no other acute concerns at this time.

## 2024-01-16 ENCOUNTER — RX RENEWAL (OUTPATIENT)
Age: 59
End: 2024-01-16

## 2024-01-19 ENCOUNTER — APPOINTMENT (OUTPATIENT)
Dept: OBGYN | Facility: CLINIC | Age: 59
End: 2024-01-19
Payer: MEDICAID

## 2024-01-19 VITALS
HEIGHT: 63 IN | BODY MASS INDEX: 28.53 KG/M2 | DIASTOLIC BLOOD PRESSURE: 88 MMHG | HEART RATE: 63 BPM | WEIGHT: 161 LBS | SYSTOLIC BLOOD PRESSURE: 147 MMHG

## 2024-01-19 PROCEDURE — 57500 BIOPSY OF CERVIX: CPT

## 2024-01-19 PROCEDURE — 99203 OFFICE O/P NEW LOW 30 MIN: CPT | Mod: 25

## 2024-01-23 ENCOUNTER — APPOINTMENT (OUTPATIENT)
Dept: OBGYN | Facility: CLINIC | Age: 59
End: 2024-01-23

## 2024-01-23 ENCOUNTER — OUTPATIENT (OUTPATIENT)
Dept: OUTPATIENT SERVICES | Facility: HOSPITAL | Age: 59
LOS: 1 days | End: 2024-01-23

## 2024-01-23 ENCOUNTER — APPOINTMENT (OUTPATIENT)
Dept: INTERNAL MEDICINE | Facility: CLINIC | Age: 59
End: 2024-01-23
Payer: MEDICAID

## 2024-01-23 VITALS — SYSTOLIC BLOOD PRESSURE: 142 MMHG | HEART RATE: 72 BPM | DIASTOLIC BLOOD PRESSURE: 86 MMHG

## 2024-01-23 DIAGNOSIS — N84.1 POLYP OF CERVIX UTERI: ICD-10-CM

## 2024-01-23 DIAGNOSIS — K76.0 FATTY (CHANGE OF) LIVER, NOT ELSEWHERE CLASSIFIED: ICD-10-CM

## 2024-01-23 PROCEDURE — 99213 OFFICE O/P EST LOW 20 MIN: CPT | Mod: GE

## 2024-01-23 RX ORDER — ATORVASTATIN CALCIUM 20 MG/1
20 TABLET, FILM COATED ORAL
Qty: 90 | Refills: 1 | Status: ACTIVE | COMMUNITY
Start: 2021-10-04 | End: 1900-01-01

## 2024-01-23 RX ORDER — FLUTICASONE PROPIONATE AND SALMETEROL 113; 14 UG/1; UG/1
113-14 POWDER, METERED RESPIRATORY (INHALATION)
Qty: 1 | Refills: 0 | Status: ACTIVE | COMMUNITY
Start: 2021-10-04 | End: 1900-01-01

## 2024-01-23 RX ORDER — SEMAGLUTIDE 0.68 MG/ML
2 INJECTION, SOLUTION SUBCUTANEOUS WEEKLY
Qty: 1 | Refills: 2 | Status: DISCONTINUED | COMMUNITY
Start: 2023-04-28 | End: 2024-01-23

## 2024-01-23 RX ORDER — SEMAGLUTIDE 1.34 MG/ML
2 INJECTION, SOLUTION SUBCUTANEOUS
Qty: 1.5 | Refills: 1 | Status: DISCONTINUED | COMMUNITY
Start: 2023-03-16 | End: 2024-01-23

## 2024-01-23 RX ORDER — FLUTICASONE PROPIONATE 50 UG/1
50 SPRAY, METERED NASAL
Qty: 16 | Refills: 1 | Status: ACTIVE | COMMUNITY
Start: 2023-06-28 | End: 1900-01-01

## 2024-01-23 RX ORDER — PANTOPRAZOLE 40 MG/1
40 TABLET, DELAYED RELEASE ORAL
Qty: 90 | Refills: 1 | Status: ACTIVE | COMMUNITY
Start: 2023-05-12 | End: 1900-01-01

## 2024-01-23 RX ORDER — ALBUTEROL SULFATE 90 UG/1
108 (90 BASE) INHALANT RESPIRATORY (INHALATION)
Qty: 1 | Refills: 6 | Status: ACTIVE | COMMUNITY
Start: 2021-10-04 | End: 1900-01-01

## 2024-01-23 NOTE — PLAN
[FreeTextEntry1] : 59y/o presents as referal from PCP with cervical polyp -Small polyp noted, removed from external os -Recommend sonogram to assess stalk of polyp -F/u pathology   joanna BURNETT

## 2024-01-23 NOTE — HISTORY OF PRESENT ILLNESS
[FreeTextEntry1] : 59 y/o presents from PCP with cervical polyp. Pt reports this was noted on pelvic exam by PCP, prolapsing from the cervix. Reports same situation 3 years ago Reports 1 episode of shooting pelvic pain, resolved quickly.  Menopause in 40s, denies PMB Not sexually active.

## 2024-01-23 NOTE — PHYSICAL EXAM
[Chaperone Present] : A chaperone was present in the examining room during all aspects of the physical examination [FreeTextEntry1] : RAMIRO Banerjee [Appropriately responsive] : appropriately responsive [Alert] : alert [No Acute Distress] : no acute distress [No Lymphadenopathy] : no lymphadenopathy [Regular Rate Rhythm] : regular rate rhythm [No Murmurs] : no murmurs [Clear to Auscultation B/L] : clear to auscultation bilaterally [Soft] : soft [Non-tender] : non-tender [Non-distended] : non-distended [No HSM] : No HSM [No Lesions] : no lesions [No Mass] : no mass [Oriented x3] : oriented x3 [Labia Majora] : normal [Labia Minora] : normal [Normal] : normal [Polyp ___ cm] : [unfilled] ~[facility] polyp

## 2024-01-24 PROBLEM — K76.0 STEATOSIS, LIVER: Status: ACTIVE | Noted: 2021-10-04

## 2024-01-24 PROBLEM — N84.1 CERVICAL POLYP: Status: ACTIVE | Noted: 2017-08-22

## 2024-01-24 RX ORDER — METOPROLOL SUCCINATE 25 MG/1
25 TABLET, EXTENDED RELEASE ORAL
Qty: 90 | Refills: 1 | Status: DISCONTINUED | COMMUNITY
Start: 2022-04-08 | End: 2024-01-24

## 2024-01-26 LAB — CORE LAB BIOPSY: NORMAL

## 2024-01-26 NOTE — PHYSICAL EXAM
[No Acute Distress] : no acute distress [Well Nourished] : well nourished [Well Developed] : well developed [Well-Appearing] : well-appearing [Normal Sclera/Conjunctiva] : normal sclera/conjunctiva [PERRL] : pupils equal round and reactive to light [EOMI] : extraocular movements intact [Normal Outer Ear/Nose] : the outer ears and nose were normal in appearance [Normal Oropharynx] : the oropharynx was normal [No JVD] : no jugular venous distention [No Lymphadenopathy] : no lymphadenopathy [Supple] : supple [Thyroid Normal, No Nodules] : the thyroid was normal and there were no nodules present [No Respiratory Distress] : no respiratory distress  [No Accessory Muscle Use] : no accessory muscle use [Clear to Auscultation] : lungs were clear to auscultation bilaterally [Normal Rate] : normal rate  [Regular Rhythm] : with a regular rhythm [Normal S1, S2] : normal S1 and S2 [No Murmur] : no murmur heard [No Abdominal Bruit] : a ~M bruit was not heard ~T in the abdomen [No Varicosities] : no varicosities [Pedal Pulses Present] : the pedal pulses are present [No Edema] : there was no peripheral edema [Soft] : abdomen soft [Non Tender] : non-tender [Non-distended] : non-distended [No Masses] : no abdominal mass palpated [Normal Bowel Sounds] : normal bowel sounds [No HSM] : no HSM [No CVA Tenderness] : no CVA  tenderness [No Spinal Tenderness] : no spinal tenderness [No Joint Swelling] : no joint swelling [Grossly Normal Strength/Tone] : grossly normal strength/tone [No Rash] : no rash [Coordination Grossly Intact] : coordination grossly intact [No Focal Deficits] : no focal deficits [Normal Gait] : normal gait [Deep Tendon Reflexes (DTR)] : deep tendon reflexes were 2+ and symmetric [Normal Affect] : the affect was normal [Normal Insight/Judgement] : insight and judgment were intact [Comprehensive Foot Exam Normal] : Right and left foot were examined and both feet are normal. No ulcers in either foot. Toes are normal and with full ROM.  Normal tactile sensation with monofilament testing throughout both feet [de-identified] : Cervical polyp observed

## 2024-01-26 NOTE — ADDENDUM
[FreeTextEntry1] : Case discussed with Dr. Daigle Total time of encounter 30 min RTC in 3 months  Vani Ding, PGY-3 [employer]

## 2024-01-26 NOTE — HISTORY OF PRESENT ILLNESS
[de-identified] : 58F with HTN, T2DM (a1c 5.7 in October 2023), hepatic steatosis, stage 1 obesity, and history of cholecystectomy (2022) presenting for follow up.   At the last visit patient recieved a pap smear, pelvic exam was c/f a finding of a cervical polyp. Patient was referred to obgyn. Per patient the ob gyn provider did an exam and told her it was scarred/folded tissue from her prior polyp resection. They took a sample but did not resect the mass at this time.   She is taking her Ozempic as prescribed and has lost greater than 20 pounds since initiating it. She also reports improved blood pressure control. She denies nausea, vomiting or diarrhea. She reports increasing exercise and participates in a pump it up class every Wednesday. She started to incorporate some of the exercises at home as well.  Pt reports she sometimes missed her morning BP medications (the losartan and hydrochlorothiazide 25 mg). She takes her amlodipine at night. The patient also did not realize she was supposed to take her hydrochlorothiazide two times a day, (given because her BP has been difficult to control).  -Stable - Stable

## 2024-01-26 NOTE — REVIEW OF SYSTEMS
[Joint Pain] : joint pain [Joint Stiffness] : joint stiffness [Anxiety] : anxiety [Negative] : Heme/Lymph [Fever] : no fever [Chills] : no chills [Fatigue] : no fatigue [Recent Change In Weight] : ~T no recent weight change [Night Sweats] : no night sweats [Discharge] : no discharge [Pain] : no pain [Vision Problems] : no vision problems [Itching] : no itching [Earache] : no earache [Hoarseness] : no hoarseness [Hearing Loss] : no hearing loss [Nasal Discharge] : no nasal discharge [Sore Throat] : no sore throat [Palpitations] : no palpitations [Chest Pain] : no chest pain [Lower Ext Edema] : no lower extremity edema [Orthopnea] : no orthopnea [Shortness Of Breath] : no shortness of breath [Wheezing] : no wheezing [Cough] : no cough [Abdominal Pain] : no abdominal pain [Dyspnea on Exertion] : no dyspnea on exertion [Nausea] : no nausea [Constipation] : no constipation [Diarrhea] : diarrhea [Vomiting] : no vomiting [Heartburn] : no heartburn [Dysuria] : no dysuria [Melena] : no melena [Incontinence] : no incontinence [Hematuria] : no hematuria [Frequency] : no frequency [Vaginal Discharge] : no vaginal discharge [Joint Swelling] : no joint swelling [Muscle Weakness] : no muscle weakness [Muscle Pain] : no muscle pain [Back Pain] : no back pain [Itching] : no itching [Mole Changes] : no mole changes [Skin Rash] : no skin rash [Headache] : no headache [Dizziness] : no dizziness [Fainting] : no fainting [Memory Loss] : no memory loss [Unsteady Walking] : no ataxia [Suicidal] : not suicidal [Insomnia] : no insomnia [FreeTextEntry9] : L shoulder pain

## 2024-01-30 DIAGNOSIS — E11.29 TYPE 2 DIABETES MELLITUS WITH OTHER DIABETIC KIDNEY COMPLICATION: ICD-10-CM

## 2024-01-30 DIAGNOSIS — K76.0 FATTY (CHANGE OF) LIVER, NOT ELSEWHERE CLASSIFIED: ICD-10-CM

## 2024-01-30 DIAGNOSIS — I10 ESSENTIAL (PRIMARY) HYPERTENSION: ICD-10-CM

## 2024-01-30 DIAGNOSIS — N84.1 POLYP OF CERVIX UTERI: ICD-10-CM

## 2024-02-05 ENCOUNTER — NON-APPOINTMENT (OUTPATIENT)
Age: 59
End: 2024-02-05

## 2024-02-05 ENCOUNTER — APPOINTMENT (OUTPATIENT)
Dept: INTERNAL MEDICINE | Facility: CLINIC | Age: 59
End: 2024-02-05

## 2024-02-05 DIAGNOSIS — H53.9 UNSPECIFIED VISUAL DISTURBANCE: ICD-10-CM

## 2024-02-06 ENCOUNTER — NON-APPOINTMENT (OUTPATIENT)
Age: 59
End: 2024-02-06

## 2024-02-06 ENCOUNTER — APPOINTMENT (OUTPATIENT)
Dept: OPHTHALMOLOGY | Facility: CLINIC | Age: 59
End: 2024-02-06
Payer: MEDICAID

## 2024-02-06 PROCEDURE — 92201 OPSCPY EXTND RTA DRAW UNI/BI: CPT

## 2024-02-06 PROCEDURE — 92014 COMPRE OPH EXAM EST PT 1/>: CPT | Mod: 25

## 2024-02-29 ENCOUNTER — APPOINTMENT (OUTPATIENT)
Dept: INTERNAL MEDICINE | Facility: CLINIC | Age: 59
End: 2024-02-29
Payer: COMMERCIAL

## 2024-02-29 ENCOUNTER — OUTPATIENT (OUTPATIENT)
Dept: OUTPATIENT SERVICES | Facility: HOSPITAL | Age: 59
LOS: 1 days | End: 2024-02-29

## 2024-02-29 VITALS — BODY MASS INDEX: 28.35 KG/M2 | WEIGHT: 160 LBS | HEIGHT: 63 IN

## 2024-02-29 DIAGNOSIS — R20.8 OTHER DISTURBANCES OF SKIN SENSATION: ICD-10-CM

## 2024-02-29 DIAGNOSIS — Z98.89 OTHER SPECIFIED POSTPROCEDURAL STATES: Chronic | ICD-10-CM

## 2024-02-29 DIAGNOSIS — H33.319 HORSESHOE TEAR OF RETINA W/OUT DETACHMENT, UNSPECIFIED EYE: ICD-10-CM

## 2024-02-29 PROCEDURE — 99443: CPT

## 2024-02-29 NOTE — ADDENDUM
[FreeTextEntry1] : Case discussed with Dr. Ceron Total time of encounter 30 min RTC as previously scheduled for DM check, will check BP then as well   Vani Ding, PGY-3 [employer]

## 2024-02-29 NOTE — HISTORY OF PRESENT ILLNESS
[Home] : at home, [unfilled] , at the time of the visit. [Medical Office: ([facility])___] : at the medical office located in  [Verbal consent obtained from patient] : the patient, [unfilled] [de-identified] : 59F with HTN, T2DM (a1c 5.7 in October 2023), hepatic steatosis, stage 1 obesity, and history of cholecystectomy (2022) presenting for telehealth visit for acute concern of sensation of liquids passing through to breast.  She has noted 2-3 episodes over the past few weeks where if she drinks a cold liquid; she gets a cool sensation in her left breast. She denies any associated chest pain, palpitations, sour taste in mouth, reflux, abdominal pain, nausea or vomiting. She would like to get a referral for a mammogram.   Additionally, she reports 2 weeks ago seeing floaters in her eye. She went to the emergency room and was seen by opthalmology who found a tear in her retina and it was surgically corrected. The floater has since then started to fade, and she has follow up with her ophthalmologist in April

## 2024-02-29 NOTE — PHYSICAL EXAM
[No Acute Distress] : no acute distress [Well Developed] : well developed [Well Nourished] : well nourished [No Respiratory Distress] : no respiratory distress  [No Accessory Muscle Use] : no accessory muscle use [No Edema] : there was no peripheral edema [Soft] : abdomen soft [Non-distended] : non-distended [Non Tender] : non-tender [No Masses] : no abdominal mass palpated [No Joint Swelling] : no joint swelling [Normal Affect] : the affect was normal [No Rash] : no rash [Normal Insight/Judgement] : insight and judgment were intact

## 2024-02-29 NOTE — REVIEW OF SYSTEMS
[Negative] : Psychiatric [Chills] : no chills [Fever] : no fever [Fatigue] : no fatigue [Night Sweats] : no night sweats [Chest Pain] : no chest pain [Leg Claudication] : no leg claudication [Palpitations] : no palpitations [Orthopnea] : no orthopnea [Lower Ext Edema] : no lower extremity edema [Wheezing] : no wheezing [Shortness Of Breath] : no shortness of breath [Abdominal Pain] : no abdominal pain [Cough] : no cough [Nausea] : no nausea [Constipation] : no constipation [Diarrhea] : diarrhea [Heartburn] : no heartburn [Vomiting] : no vomiting [Melena] : no melena [Dysuria] : no dysuria [Hematuria] : no hematuria [Joint Pain] : no joint pain [Joint Stiffness] : no joint stiffness [Muscle Pain] : no muscle pain [Back Pain] : no back pain [Itching] : no itching [Skin Rash] : no skin rash [Headache] : no headache [Dizziness] : no dizziness [Fainting] : no fainting [Memory Loss] : no memory loss

## 2024-03-01 DIAGNOSIS — H33.319 HORSESHOE TEAR OF RETINA WITHOUT DETACHMENT, UNSPECIFIED EYE: ICD-10-CM

## 2024-03-01 DIAGNOSIS — I10 ESSENTIAL (PRIMARY) HYPERTENSION: ICD-10-CM

## 2024-03-01 DIAGNOSIS — R20.8 OTHER DISTURBANCES OF SKIN SENSATION: ICD-10-CM

## 2024-03-30 NOTE — ED ADULT NURSE NOTE - PAIN RATING/NUMBER SCALE (0-10): ACTIVITY
6 Patient presents to ED with dental pain after falling into bed frame. Minimal bleeding noted to top tooth. Denies LOC or vomiting. Patient awake and alert, easy WOB.  Denies PMHx, SHx, NKDA. IUTD.

## 2024-04-02 ENCOUNTER — APPOINTMENT (OUTPATIENT)
Dept: INTERNAL MEDICINE | Facility: CLINIC | Age: 59
End: 2024-04-02
Payer: MEDICAID

## 2024-04-02 ENCOUNTER — OUTPATIENT (OUTPATIENT)
Dept: OUTPATIENT SERVICES | Facility: HOSPITAL | Age: 59
LOS: 1 days | End: 2024-04-02

## 2024-04-02 VITALS
HEART RATE: 73 BPM | WEIGHT: 157 LBS | BODY MASS INDEX: 27.82 KG/M2 | SYSTOLIC BLOOD PRESSURE: 124 MMHG | OXYGEN SATURATION: 100 % | DIASTOLIC BLOOD PRESSURE: 76 MMHG | HEIGHT: 63 IN

## 2024-04-02 DIAGNOSIS — I10 ESSENTIAL (PRIMARY) HYPERTENSION: ICD-10-CM

## 2024-04-02 DIAGNOSIS — E11.29 TYPE 2 DIABETES MELLITUS WITH OTHER DIABETIC KIDNEY COMPLICATION: ICD-10-CM

## 2024-04-02 DIAGNOSIS — L98.9 DISORDER OF THE SKIN AND SUBCUTANEOUS TISSUE, UNSPECIFIED: ICD-10-CM

## 2024-04-02 DIAGNOSIS — Z98.89 OTHER SPECIFIED POSTPROCEDURAL STATES: Chronic | ICD-10-CM

## 2024-04-02 DIAGNOSIS — R80.9 TYPE 2 DIABETES MELLITUS WITH OTHER DIABETIC KIDNEY COMPLICATION: ICD-10-CM

## 2024-04-02 PROCEDURE — 99213 OFFICE O/P EST LOW 20 MIN: CPT | Mod: GE

## 2024-04-02 NOTE — PHYSICAL EXAM
[No Acute Distress] : no acute distress [Well Nourished] : well nourished [Well Developed] : well developed [Normal Sclera/Conjunctiva] : normal sclera/conjunctiva [Well-Appearing] : well-appearing [PERRL] : pupils equal round and reactive to light [EOMI] : extraocular movements intact [Normal Outer Ear/Nose] : the outer ears and nose were normal in appearance [Normal Oropharynx] : the oropharynx was normal [No JVD] : no jugular venous distention [No Lymphadenopathy] : no lymphadenopathy [Supple] : supple [Thyroid Normal, No Nodules] : the thyroid was normal and there were no nodules present [No Respiratory Distress] : no respiratory distress  [No Accessory Muscle Use] : no accessory muscle use [Normal Rate] : normal rate  [Clear to Auscultation] : lungs were clear to auscultation bilaterally [Normal S1, S2] : normal S1 and S2 [Regular Rhythm] : with a regular rhythm [No Murmur] : no murmur heard [No Abdominal Bruit] : a ~M bruit was not heard ~T in the abdomen [No Varicosities] : no varicosities [Pedal Pulses Present] : the pedal pulses are present [No Edema] : there was no peripheral edema [Soft] : abdomen soft [Non Tender] : non-tender [No Masses] : no abdominal mass palpated [Non-distended] : non-distended [Normal Bowel Sounds] : normal bowel sounds [No HSM] : no HSM [No CVA Tenderness] : no CVA  tenderness [No Spinal Tenderness] : no spinal tenderness [No Joint Swelling] : no joint swelling [Grossly Normal Strength/Tone] : grossly normal strength/tone [Coordination Grossly Intact] : coordination grossly intact [No Rash] : no rash [No Focal Deficits] : no focal deficits [Normal Gait] : normal gait [Normal Affect] : the affect was normal [Deep Tendon Reflexes (DTR)] : deep tendon reflexes were 2+ and symmetric [Normal Insight/Judgement] : insight and judgment were intact [Comprehensive Foot Exam Normal] : Right and left foot were examined and both feet are normal. No ulcers in either foot. Toes are normal and with full ROM.  Normal tactile sensation with monofilament testing throughout both feet [de-identified] : Cervical polyp observed

## 2024-04-03 PROBLEM — L98.9 SKIN LESIONS: Status: ACTIVE | Noted: 2021-12-13

## 2024-04-03 PROBLEM — E11.29 TYPE 2 DIABETES MELLITUS WITH MICROALBUMINURIA, WITHOUT LONG-TERM CURRENT USE OF INSULIN: Status: ACTIVE | Noted: 2018-02-16

## 2024-04-03 LAB
ALBUMIN SERPL ELPH-MCNC: 4.3 G/DL
ALP BLD-CCNC: 61 U/L
ALT SERPL-CCNC: 19 U/L
ANION GAP SERPL CALC-SCNC: 12 MMOL/L
AST SERPL-CCNC: 26 U/L
BILIRUB SERPL-MCNC: 0.5 MG/DL
BUN SERPL-MCNC: 19 MG/DL
CALCIUM SERPL-MCNC: 10 MG/DL
CHLORIDE SERPL-SCNC: 101 MMOL/L
CO2 SERPL-SCNC: 31 MMOL/L
CREAT SERPL-MCNC: 1.12 MG/DL
CREAT SPEC-SCNC: 219 MG/DL
EGFR: 56 ML/MIN/1.73M2
ESTIMATED AVERAGE GLUCOSE: 108 MG/DL
GLUCOSE SERPL-MCNC: 87 MG/DL
HBA1C MFR BLD HPLC: 5.4 %
MICROALBUMIN 24H UR DL<=1MG/L-MCNC: 2.3 MG/DL
MICROALBUMIN/CREAT 24H UR-RTO: 11 MG/G
POTASSIUM SERPL-SCNC: 3.4 MMOL/L
PROT SERPL-MCNC: 7.4 G/DL
SODIUM SERPL-SCNC: 143 MMOL/L

## 2024-04-03 NOTE — ADDENDUM
[FreeTextEntry1] : Case discussed with Dr. Leonardo Total time of encounter 30 minutes RTC in 3 months   Vani Ding, PGY-3 [employer]

## 2024-04-03 NOTE — REVIEW OF SYSTEMS
[Joint Stiffness] : joint stiffness [Anxiety] : anxiety [Negative] : Psychiatric [FreeTextEntry9] : L shoulder pain [Fever] : no fever [Chills] : no chills [Fatigue] : no fatigue [Night Sweats] : no night sweats [Recent Change In Weight] : ~T no recent weight change [Pain] : no pain [Discharge] : no discharge [Vision Problems] : no vision problems [Itching] : no itching [Earache] : no earache [Hearing Loss] : no hearing loss [Hoarseness] : no hoarseness [Nasal Discharge] : no nasal discharge [Sore Throat] : no sore throat [Chest Pain] : no chest pain [Palpitations] : no palpitations [Orthopnea] : no orthopnea [Lower Ext Edema] : no lower extremity edema [Shortness Of Breath] : no shortness of breath [Wheezing] : no wheezing [Cough] : no cough [Dyspnea on Exertion] : no dyspnea on exertion [Nausea] : no nausea [Abdominal Pain] : no abdominal pain [Constipation] : no constipation [Diarrhea] : diarrhea [Vomiting] : no vomiting [Heartburn] : no heartburn [Melena] : no melena [Dysuria] : no dysuria [Incontinence] : no incontinence [Hematuria] : no hematuria [Frequency] : no frequency [Vaginal Discharge] : no vaginal discharge [Joint Pain] : no joint pain [Joint Swelling] : no joint swelling [Muscle Weakness] : no muscle weakness [Muscle Pain] : no muscle pain [Back Pain] : no back pain [Itching] : no itching [Mole Changes] : no mole changes [Skin Rash] : no skin rash [Headache] : no headache [Dizziness] : no dizziness [Fainting] : no fainting [Memory Loss] : no memory loss [Unsteady Walking] : no ataxia [Suicidal] : not suicidal [Insomnia] : no insomnia

## 2024-04-03 NOTE — HISTORY OF PRESENT ILLNESS
[de-identified] : 58F with HTN, T2DM (a1c 5.7 in October 2023), hepatic steatosis, stage 1 obesity, and history of cholecystectomy (2022) presenting for follow up of blood pressure.  She was taking her Ozempic as prescribed and has lost greater than 20 pounds since initiating it, however recently she has not been able to get it from her pharmacy.  She also reports improved blood pressure control and takes her blood pressure medications as prescribed (previously was missing doses). Her blood pressure in office today is 124/76. She reports home BP readings of 100s-120s systolic. She denies any dizziness or loss of consciousness. She denies nausea, vomiting or diarrhea. She reports increasing exercise and participates in a pump it up class every Wednesday. She started to incorporate some of the exercises at home as well.  She reports continued itchiness of multiple moles on her body with hair growing out of them.

## 2024-04-04 DIAGNOSIS — I10 ESSENTIAL (PRIMARY) HYPERTENSION: ICD-10-CM

## 2024-04-04 DIAGNOSIS — E11.29 TYPE 2 DIABETES MELLITUS WITH OTHER DIABETIC KIDNEY COMPLICATION: ICD-10-CM

## 2024-04-04 DIAGNOSIS — L98.9 DISORDER OF THE SKIN AND SUBCUTANEOUS TISSUE, UNSPECIFIED: ICD-10-CM

## 2024-04-12 RX ORDER — HYDROCHLOROTHIAZIDE 25 MG/1
25 TABLET ORAL
Qty: 180 | Refills: 2 | Status: ACTIVE | COMMUNITY
Start: 2022-08-17 | End: 1900-01-01

## 2024-04-12 RX ORDER — LISINOPRIL 40 MG/1
40 TABLET ORAL
Qty: 90 | Refills: 1 | Status: ACTIVE | COMMUNITY
Start: 2019-12-09 | End: 1900-01-01

## 2024-04-18 RX ORDER — SEMAGLUTIDE 1.34 MG/ML
4 INJECTION, SOLUTION SUBCUTANEOUS
Qty: 1 | Refills: 6 | Status: ACTIVE | COMMUNITY
Start: 2023-06-28 | End: 1900-01-01

## 2024-07-15 ENCOUNTER — APPOINTMENT (OUTPATIENT)
Dept: INTERNAL MEDICINE | Facility: CLINIC | Age: 59
End: 2024-07-15
Payer: MEDICAID

## 2024-07-15 ENCOUNTER — OUTPATIENT (OUTPATIENT)
Dept: OUTPATIENT SERVICES | Facility: HOSPITAL | Age: 59
LOS: 1 days | End: 2024-07-15

## 2024-07-15 VITALS
SYSTOLIC BLOOD PRESSURE: 130 MMHG | OXYGEN SATURATION: 100 % | WEIGHT: 162 LBS | DIASTOLIC BLOOD PRESSURE: 82 MMHG | BODY MASS INDEX: 28.7 KG/M2 | HEIGHT: 63 IN | HEART RATE: 69 BPM

## 2024-07-15 DIAGNOSIS — E11.29 TYPE 2 DIABETES MELLITUS WITH OTHER DIABETIC KIDNEY COMPLICATION: ICD-10-CM

## 2024-07-15 DIAGNOSIS — R80.9 TYPE 2 DIABETES MELLITUS WITH OTHER DIABETIC KIDNEY COMPLICATION: ICD-10-CM

## 2024-07-15 DIAGNOSIS — Z98.89 OTHER SPECIFIED POSTPROCEDURAL STATES: Chronic | ICD-10-CM

## 2024-07-15 DIAGNOSIS — L98.9 DISORDER OF THE SKIN AND SUBCUTANEOUS TISSUE, UNSPECIFIED: ICD-10-CM

## 2024-07-15 DIAGNOSIS — I10 ESSENTIAL (PRIMARY) HYPERTENSION: ICD-10-CM

## 2024-07-15 PROCEDURE — 99214 OFFICE O/P EST MOD 30 MIN: CPT

## 2024-07-17 LAB
ALBUMIN SERPL ELPH-MCNC: 4.4 G/DL
ALP BLD-CCNC: 61 U/L
ALT SERPL-CCNC: 15 U/L
ANION GAP SERPL CALC-SCNC: 11 MMOL/L
AST SERPL-CCNC: 23 U/L
BASOPHILS # BLD AUTO: 0.04 K/UL
BASOPHILS NFR BLD AUTO: 0.5 %
BILIRUB SERPL-MCNC: 0.6 MG/DL
BUN SERPL-MCNC: 17 MG/DL
CALCIUM SERPL-MCNC: 9.9 MG/DL
CHLORIDE SERPL-SCNC: 98 MMOL/L
CHOLEST SERPL-MCNC: 131 MG/DL
CO2 SERPL-SCNC: 33 MMOL/L
CREAT SERPL-MCNC: 0.91 MG/DL
EGFR: 72 ML/MIN/1.73M2
EOSINOPHIL # BLD AUTO: 0.12 K/UL
EOSINOPHIL NFR BLD AUTO: 1.4 %
ESTIMATED AVERAGE GLUCOSE: 105 MG/DL
GLUCOSE SERPL-MCNC: 98 MG/DL
HBA1C MFR BLD HPLC: 5.3 %
HCT VFR BLD CALC: 34.3 %
HDLC SERPL-MCNC: 62 MG/DL
HGB BLD-MCNC: 11.2 G/DL
IMM GRANULOCYTES NFR BLD AUTO: 0.1 %
LDLC SERPL CALC-MCNC: 54 MG/DL
LYMPHOCYTES # BLD AUTO: 2.86 K/UL
LYMPHOCYTES NFR BLD AUTO: 33.3 %
MAN DIFF?: NORMAL
MCHC RBC-ENTMCNC: 29.5 PG
MCHC RBC-ENTMCNC: 32.7 GM/DL
MCV RBC AUTO: 90.3 FL
MONOCYTES # BLD AUTO: 0.43 K/UL
MONOCYTES NFR BLD AUTO: 5 %
NEUTROPHILS # BLD AUTO: 5.14 K/UL
NEUTROPHILS NFR BLD AUTO: 59.7 %
NONHDLC SERPL-MCNC: 69 MG/DL
PLATELET # BLD AUTO: 304 K/UL
POTASSIUM SERPL-SCNC: 3.4 MMOL/L
PROT SERPL-MCNC: 7.4 G/DL
RBC # BLD: 3.8 M/UL
RBC # FLD: 12.8 %
SODIUM SERPL-SCNC: 142 MMOL/L
TRIGL SERPL-MCNC: 78 MG/DL
TSH SERPL-ACNC: 0.51 UIU/ML
WBC # FLD AUTO: 8.6 K/UL

## 2024-07-25 DIAGNOSIS — I10 ESSENTIAL (PRIMARY) HYPERTENSION: ICD-10-CM

## 2024-07-25 DIAGNOSIS — L98.9 DISORDER OF THE SKIN AND SUBCUTANEOUS TISSUE, UNSPECIFIED: ICD-10-CM

## 2024-07-25 DIAGNOSIS — E11.29 TYPE 2 DIABETES MELLITUS WITH OTHER DIABETIC KIDNEY COMPLICATION: ICD-10-CM

## 2024-08-02 ENCOUNTER — RX RENEWAL (OUTPATIENT)
Age: 59
End: 2024-08-02

## 2024-08-19 ENCOUNTER — OUTPATIENT (OUTPATIENT)
Dept: OUTPATIENT SERVICES | Facility: HOSPITAL | Age: 59
LOS: 1 days | End: 2024-08-19
Payer: MEDICAID

## 2024-08-19 ENCOUNTER — APPOINTMENT (OUTPATIENT)
Dept: MAMMOGRAPHY | Facility: CLINIC | Age: 59
End: 2024-08-19
Payer: MEDICAID

## 2024-08-19 DIAGNOSIS — Z00.8 ENCOUNTER FOR OTHER GENERAL EXAMINATION: ICD-10-CM

## 2024-08-19 DIAGNOSIS — Z98.89 OTHER SPECIFIED POSTPROCEDURAL STATES: Chronic | ICD-10-CM

## 2024-08-19 PROCEDURE — 77067 SCR MAMMO BI INCL CAD: CPT | Mod: 26

## 2024-08-19 PROCEDURE — 77063 BREAST TOMOSYNTHESIS BI: CPT

## 2024-08-19 PROCEDURE — 77067 SCR MAMMO BI INCL CAD: CPT

## 2024-08-19 PROCEDURE — 77063 BREAST TOMOSYNTHESIS BI: CPT | Mod: 26

## 2024-09-10 ENCOUNTER — APPOINTMENT (OUTPATIENT)
Dept: ULTRASOUND IMAGING | Facility: IMAGING CENTER | Age: 59
End: 2024-09-10
Payer: MEDICAID

## 2024-09-10 ENCOUNTER — RESULT REVIEW (OUTPATIENT)
Age: 59
End: 2024-09-10

## 2024-09-10 ENCOUNTER — OUTPATIENT (OUTPATIENT)
Dept: OUTPATIENT SERVICES | Facility: HOSPITAL | Age: 59
LOS: 1 days | End: 2024-09-10
Payer: MEDICAID

## 2024-09-10 DIAGNOSIS — Z98.89 OTHER SPECIFIED POSTPROCEDURAL STATES: Chronic | ICD-10-CM

## 2024-09-10 DIAGNOSIS — Z00.8 ENCOUNTER FOR OTHER GENERAL EXAMINATION: ICD-10-CM

## 2024-09-10 PROCEDURE — 76642 ULTRASOUND BREAST LIMITED: CPT | Mod: 26,LT

## 2024-09-10 PROCEDURE — 76642 ULTRASOUND BREAST LIMITED: CPT

## 2024-09-24 ENCOUNTER — APPOINTMENT (OUTPATIENT)
Dept: DERMATOLOGY | Facility: CLINIC | Age: 59
End: 2024-09-24

## 2024-10-14 ENCOUNTER — RX RENEWAL (OUTPATIENT)
Age: 59
End: 2024-10-14

## 2024-11-20 ENCOUNTER — OUTPATIENT (OUTPATIENT)
Dept: OUTPATIENT SERVICES | Facility: HOSPITAL | Age: 59
LOS: 1 days | End: 2024-11-20

## 2024-11-20 ENCOUNTER — APPOINTMENT (OUTPATIENT)
Dept: INTERNAL MEDICINE | Facility: CLINIC | Age: 59
End: 2024-11-20

## 2024-11-20 VITALS
SYSTOLIC BLOOD PRESSURE: 130 MMHG | DIASTOLIC BLOOD PRESSURE: 78 MMHG | WEIGHT: 162 LBS | HEART RATE: 77 BPM | HEIGHT: 63 IN | OXYGEN SATURATION: 99 % | BODY MASS INDEX: 28.7 KG/M2

## 2024-11-20 DIAGNOSIS — R11.0 NAUSEA: ICD-10-CM

## 2024-11-20 DIAGNOSIS — E11.29 TYPE 2 DIABETES MELLITUS WITH OTHER DIABETIC KIDNEY COMPLICATION: ICD-10-CM

## 2024-11-20 DIAGNOSIS — D64.9 ANEMIA, UNSPECIFIED: ICD-10-CM

## 2024-11-20 DIAGNOSIS — Z98.89 OTHER SPECIFIED POSTPROCEDURAL STATES: Chronic | ICD-10-CM

## 2024-11-20 DIAGNOSIS — R80.9 TYPE 2 DIABETES MELLITUS WITH OTHER DIABETIC KIDNEY COMPLICATION: ICD-10-CM

## 2024-11-20 DIAGNOSIS — L98.9 DISORDER OF THE SKIN AND SUBCUTANEOUS TISSUE, UNSPECIFIED: ICD-10-CM

## 2024-11-20 DIAGNOSIS — I10 ESSENTIAL (PRIMARY) HYPERTENSION: ICD-10-CM

## 2024-11-20 DIAGNOSIS — F41.9 ANXIETY DISORDER, UNSPECIFIED: ICD-10-CM

## 2024-11-20 DIAGNOSIS — E87.6 HYPOKALEMIA: ICD-10-CM

## 2024-11-20 PROCEDURE — 99214 OFFICE O/P EST MOD 30 MIN: CPT | Mod: GC

## 2024-11-22 PROBLEM — D64.9 NORMOCYTIC ANEMIA: Status: ACTIVE | Noted: 2024-11-20

## 2024-11-22 PROBLEM — E87.6 HYPOKALEMIA: Status: ACTIVE | Noted: 2024-11-20

## 2024-12-23 ENCOUNTER — APPOINTMENT (OUTPATIENT)
Dept: INTERNAL MEDICINE | Facility: CLINIC | Age: 59
End: 2024-12-23
Payer: MEDICAID

## 2024-12-23 ENCOUNTER — OUTPATIENT (OUTPATIENT)
Dept: OUTPATIENT SERVICES | Facility: HOSPITAL | Age: 59
LOS: 1 days | End: 2024-12-23

## 2024-12-23 VITALS
HEART RATE: 68 BPM | WEIGHT: 162 LBS | TEMPERATURE: 98.2 F | OXYGEN SATURATION: 97 % | RESPIRATION RATE: 16 BRPM | HEIGHT: 63 IN | SYSTOLIC BLOOD PRESSURE: 150 MMHG | BODY MASS INDEX: 28.7 KG/M2 | DIASTOLIC BLOOD PRESSURE: 84 MMHG

## 2024-12-23 DIAGNOSIS — I10 ESSENTIAL (PRIMARY) HYPERTENSION: ICD-10-CM

## 2024-12-23 DIAGNOSIS — Z00.00 ENCOUNTER FOR GENERAL ADULT MEDICAL EXAMINATION W/OUT ABNORMAL FINDINGS: ICD-10-CM

## 2024-12-23 DIAGNOSIS — N64.4 MASTODYNIA: ICD-10-CM

## 2024-12-23 DIAGNOSIS — Z98.89 OTHER SPECIFIED POSTPROCEDURAL STATES: Chronic | ICD-10-CM

## 2024-12-23 DIAGNOSIS — H93.8X3 OTHER SPECIFIED DISORDERS OF EAR, BILATERAL: ICD-10-CM

## 2024-12-23 PROCEDURE — 99396 PREV VISIT EST AGE 40-64: CPT | Mod: GC

## 2024-12-30 DIAGNOSIS — N64.4 MASTODYNIA: ICD-10-CM

## 2024-12-30 DIAGNOSIS — I10 ESSENTIAL (PRIMARY) HYPERTENSION: ICD-10-CM

## 2024-12-30 DIAGNOSIS — Z00.00 ENCOUNTER FOR GENERAL ADULT MEDICAL EXAMINATION WITHOUT ABNORMAL FINDINGS: ICD-10-CM

## 2025-01-11 ENCOUNTER — NON-APPOINTMENT (OUTPATIENT)
Age: 60
End: 2025-01-11

## 2025-01-19 ENCOUNTER — NON-APPOINTMENT (OUTPATIENT)
Age: 60
End: 2025-01-19

## 2025-02-10 ENCOUNTER — RX RENEWAL (OUTPATIENT)
Age: 60
End: 2025-02-10

## 2025-02-14 ENCOUNTER — OUTPATIENT (OUTPATIENT)
Dept: OUTPATIENT SERVICES | Facility: HOSPITAL | Age: 60
LOS: 1 days | End: 2025-02-14

## 2025-02-14 ENCOUNTER — APPOINTMENT (OUTPATIENT)
Dept: INTERNAL MEDICINE | Facility: CLINIC | Age: 60
End: 2025-02-14
Payer: MEDICAID

## 2025-02-14 ENCOUNTER — APPOINTMENT (OUTPATIENT)
Dept: INTERNAL MEDICINE | Facility: CLINIC | Age: 60
End: 2025-02-14

## 2025-02-14 VITALS
SYSTOLIC BLOOD PRESSURE: 128 MMHG | BODY MASS INDEX: 27.64 KG/M2 | DIASTOLIC BLOOD PRESSURE: 70 MMHG | OXYGEN SATURATION: 97 % | HEART RATE: 63 BPM | HEIGHT: 63 IN | WEIGHT: 156 LBS | RESPIRATION RATE: 16 BRPM

## 2025-02-14 DIAGNOSIS — I10 ESSENTIAL (PRIMARY) HYPERTENSION: ICD-10-CM

## 2025-02-14 DIAGNOSIS — T78.3XXA ANGIONEUROTIC EDEMA, INITIAL ENCOUNTER: ICD-10-CM

## 2025-02-14 PROCEDURE — 99213 OFFICE O/P EST LOW 20 MIN: CPT

## 2025-02-17 DIAGNOSIS — I10 ESSENTIAL (PRIMARY) HYPERTENSION: ICD-10-CM

## 2025-02-17 DIAGNOSIS — T78.3XXA ANGIONEUROTIC EDEMA, INITIAL ENCOUNTER: ICD-10-CM

## 2025-03-17 ENCOUNTER — OUTPATIENT (OUTPATIENT)
Dept: OUTPATIENT SERVICES | Facility: HOSPITAL | Age: 60
LOS: 1 days | End: 2025-03-17

## 2025-03-17 ENCOUNTER — APPOINTMENT (OUTPATIENT)
Dept: OTOLARYNGOLOGY | Facility: CLINIC | Age: 60
End: 2025-03-17

## 2025-03-17 ENCOUNTER — APPOINTMENT (OUTPATIENT)
Dept: INTERNAL MEDICINE | Facility: CLINIC | Age: 60
End: 2025-03-17

## 2025-03-17 VITALS
BODY MASS INDEX: 27.64 KG/M2 | HEIGHT: 63 IN | SYSTOLIC BLOOD PRESSURE: 127 MMHG | OXYGEN SATURATION: 98 % | DIASTOLIC BLOOD PRESSURE: 78 MMHG | HEART RATE: 74 BPM | WEIGHT: 156 LBS

## 2025-03-17 DIAGNOSIS — Z00.00 ENCOUNTER FOR GENERAL ADULT MEDICAL EXAMINATION W/OUT ABNORMAL FINDINGS: ICD-10-CM

## 2025-03-17 DIAGNOSIS — I10 ESSENTIAL (PRIMARY) HYPERTENSION: ICD-10-CM

## 2025-03-17 DIAGNOSIS — E11.29 TYPE 2 DIABETES MELLITUS WITH OTHER DIABETIC KIDNEY COMPLICATION: ICD-10-CM

## 2025-03-17 DIAGNOSIS — K76.0 FATTY (CHANGE OF) LIVER, NOT ELSEWHERE CLASSIFIED: ICD-10-CM

## 2025-03-17 DIAGNOSIS — N64.4 MASTODYNIA: ICD-10-CM

## 2025-03-17 DIAGNOSIS — Z98.89 OTHER SPECIFIED POSTPROCEDURAL STATES: Chronic | ICD-10-CM

## 2025-03-17 DIAGNOSIS — R80.9 TYPE 2 DIABETES MELLITUS WITH OTHER DIABETIC KIDNEY COMPLICATION: ICD-10-CM

## 2025-03-17 DIAGNOSIS — E78.49 OTHER HYPERLIPIDEMIA: ICD-10-CM

## 2025-03-17 DIAGNOSIS — Z87.898 PERSONAL HISTORY OF OTHER SPECIFIED CONDITIONS: ICD-10-CM

## 2025-03-17 DIAGNOSIS — I49.3 VENTRICULAR PREMATURE DEPOLARIZATION: ICD-10-CM

## 2025-03-17 DIAGNOSIS — Z86.39 PERSONAL HISTORY OF OTHER ENDOCRINE, NUTRITIONAL AND METABOLIC DISEASE: ICD-10-CM

## 2025-03-17 PROCEDURE — 99213 OFFICE O/P EST LOW 20 MIN: CPT

## 2025-03-17 PROCEDURE — G2211 COMPLEX E/M VISIT ADD ON: CPT | Mod: NC

## 2025-03-17 RX ORDER — SENNOSIDES 8.6 MG TABLETS 8.6 MG/1
8.6 TABLET ORAL AT BEDTIME
Qty: 60 | Refills: 3 | Status: ACTIVE | COMMUNITY
Start: 2025-03-17 | End: 1900-01-01

## 2025-03-18 PROBLEM — Z87.898 HISTORY OF NAUSEA: Status: RESOLVED | Noted: 2024-11-20 | Resolved: 2025-03-18

## 2025-03-18 PROBLEM — Z00.00 PREVENTATIVE HEALTH CARE: Status: ACTIVE | Noted: 2025-03-18

## 2025-03-18 PROBLEM — Z86.39 HISTORY OF HYPOKALEMIA: Status: RESOLVED | Noted: 2024-11-20 | Resolved: 2025-03-18

## 2025-03-18 LAB
25(OH)D3 SERPL-MCNC: 60.5 NG/ML
ALBUMIN SERPL ELPH-MCNC: 4.4 G/DL
ALP BLD-CCNC: 65 U/L
ALT SERPL-CCNC: 16 U/L
ANION GAP SERPL CALC-SCNC: 13 MMOL/L
AST SERPL-CCNC: 26 U/L
BASOPHILS # BLD AUTO: 0.05 K/UL
BASOPHILS NFR BLD AUTO: 0.6 %
BILIRUB SERPL-MCNC: 0.7 MG/DL
BUN SERPL-MCNC: 18 MG/DL
CALCIUM SERPL-MCNC: 10.1 MG/DL
CHLORIDE SERPL-SCNC: 100 MMOL/L
CO2 SERPL-SCNC: 31 MMOL/L
CREAT SERPL-MCNC: 1 MG/DL
EGFRCR SERPLBLD CKD-EPI 2021: 64 ML/MIN/1.73M2
EOSINOPHIL # BLD AUTO: 0.09 K/UL
EOSINOPHIL NFR BLD AUTO: 1 %
ESTIMATED AVERAGE GLUCOSE: 103 MG/DL
GLUCOSE SERPL-MCNC: 84 MG/DL
HBA1C MFR BLD HPLC: 5.2 %
HCT VFR BLD CALC: 35.3 %
HGB BLD-MCNC: 11.7 G/DL
IMM GRANULOCYTES NFR BLD AUTO: 0.2 %
LYMPHOCYTES # BLD AUTO: 3.14 K/UL
LYMPHOCYTES NFR BLD AUTO: 35.1 %
MAN DIFF?: NORMAL
MCHC RBC-ENTMCNC: 30 PG
MCHC RBC-ENTMCNC: 33.1 G/DL
MCV RBC AUTO: 90.5 FL
MONOCYTES # BLD AUTO: 0.55 K/UL
MONOCYTES NFR BLD AUTO: 6.1 %
NEUTROPHILS # BLD AUTO: 5.1 K/UL
NEUTROPHILS NFR BLD AUTO: 57 %
PLATELET # BLD AUTO: 342 K/UL
POTASSIUM SERPL-SCNC: 3.5 MMOL/L
PROT SERPL-MCNC: 7.6 G/DL
RBC # BLD: 3.9 M/UL
RBC # FLD: 13 %
SODIUM SERPL-SCNC: 144 MMOL/L
WBC # FLD AUTO: 8.95 K/UL

## 2025-04-07 ENCOUNTER — RX RENEWAL (OUTPATIENT)
Age: 60
End: 2025-04-07

## 2025-06-16 ENCOUNTER — RX RENEWAL (OUTPATIENT)
Age: 60
End: 2025-06-16

## 2025-06-17 ENCOUNTER — NON-APPOINTMENT (OUTPATIENT)
Age: 60
End: 2025-06-17

## 2025-06-17 ENCOUNTER — APPOINTMENT (OUTPATIENT)
Dept: OPHTHALMOLOGY | Facility: CLINIC | Age: 60
End: 2025-06-17
Payer: MEDICAID

## 2025-06-17 PROCEDURE — 92015 DETERMINE REFRACTIVE STATE: CPT | Mod: NC

## 2025-06-17 PROCEDURE — 92014 COMPRE OPH EXAM EST PT 1/>: CPT | Mod: 25

## 2025-08-25 ENCOUNTER — RX RENEWAL (OUTPATIENT)
Age: 60
End: 2025-08-25